# Patient Record
Sex: FEMALE | Race: WHITE | NOT HISPANIC OR LATINO | Employment: FULL TIME | URBAN - METROPOLITAN AREA
[De-identification: names, ages, dates, MRNs, and addresses within clinical notes are randomized per-mention and may not be internally consistent; named-entity substitution may affect disease eponyms.]

---

## 2018-01-04 ENCOUNTER — CONVERSION ENCOUNTER (OUTPATIENT)
Dept: OTHER | Facility: HOSPITAL | Age: 38
End: 2018-01-04

## 2018-01-25 ENCOUNTER — OFFICE VISIT CONVERTED (OUTPATIENT)
Dept: ORTHOPEDIC SURGERY | Facility: CLINIC | Age: 38
End: 2018-01-25
Attending: ORTHOPAEDIC SURGERY

## 2018-02-02 ENCOUNTER — CONVERSION ENCOUNTER (OUTPATIENT)
Dept: ORTHOPEDIC SURGERY | Facility: CLINIC | Age: 38
End: 2018-02-02

## 2018-02-02 ENCOUNTER — OFFICE VISIT CONVERTED (OUTPATIENT)
Dept: ORTHOPEDIC SURGERY | Facility: CLINIC | Age: 38
End: 2018-02-02
Attending: ORTHOPAEDIC SURGERY

## 2018-02-15 ENCOUNTER — OFFICE VISIT CONVERTED (OUTPATIENT)
Dept: ORTHOPEDIC SURGERY | Facility: CLINIC | Age: 38
End: 2018-02-15
Attending: ORTHOPAEDIC SURGERY

## 2018-03-01 ENCOUNTER — OFFICE VISIT CONVERTED (OUTPATIENT)
Dept: ORTHOPEDIC SURGERY | Facility: CLINIC | Age: 38
End: 2018-03-01
Attending: ORTHOPAEDIC SURGERY

## 2018-04-05 ENCOUNTER — OFFICE VISIT CONVERTED (OUTPATIENT)
Dept: ORTHOPEDIC SURGERY | Facility: CLINIC | Age: 38
End: 2018-04-05
Attending: ORTHOPAEDIC SURGERY

## 2018-07-11 ENCOUNTER — OFFICE VISIT CONVERTED (OUTPATIENT)
Dept: OTOLARYNGOLOGY | Facility: CLINIC | Age: 38
End: 2018-07-11
Attending: OTOLARYNGOLOGY

## 2018-07-20 ENCOUNTER — OFFICE VISIT CONVERTED (OUTPATIENT)
Dept: OTOLARYNGOLOGY | Facility: CLINIC | Age: 38
End: 2018-07-20
Attending: OTOLARYNGOLOGY

## 2018-12-19 ENCOUNTER — CONVERSION ENCOUNTER (OUTPATIENT)
Dept: GENERAL RADIOLOGY | Facility: HOSPITAL | Age: 38
End: 2018-12-19

## 2019-02-15 ENCOUNTER — CONVERSION ENCOUNTER (OUTPATIENT)
Dept: PLASTIC SURGERY | Facility: CLINIC | Age: 39
End: 2019-02-15

## 2019-02-15 ENCOUNTER — OFFICE VISIT CONVERTED (OUTPATIENT)
Dept: PLASTIC SURGERY | Facility: CLINIC | Age: 39
End: 2019-02-15
Attending: PLASTIC SURGERY

## 2019-10-08 ENCOUNTER — HOSPITAL ENCOUNTER (OUTPATIENT)
Dept: OTHER | Facility: HOSPITAL | Age: 39
Discharge: HOME | End: 2019-10-08
Attending: NURSE PRACTITIONER

## 2019-10-08 LAB
BASOPHILS # BLD AUTO: 0.06 10*3/UL (ref 0–0.2)
BASOPHILS NFR BLD AUTO: 0.5 % (ref 0–3)
CONV ABS IMM GRAN: 0.07 10*3/UL (ref 0–0.2)
CONV IMMATURE GRAN: 0.5 % (ref 0–1.8)
DEPRECATED RDW RBC AUTO: 47.9 FL (ref 36.4–46.3)
EOSINOPHIL # BLD AUTO: 0.34 10*3/UL (ref 0–0.7)
EOSINOPHIL # BLD AUTO: 2.7 % (ref 0–7)
ERYTHROCYTE [DISTWIDTH] IN BLOOD BY AUTOMATED COUNT: 12.8 % (ref 11.7–14.4)
HCT VFR BLD AUTO: 47 % (ref 37–47)
HGB BLD-MCNC: 15.3 G/DL (ref 12–16)
LYMPHOCYTES # BLD AUTO: 1.69 10*3/UL (ref 1–5)
LYMPHOCYTES NFR BLD AUTO: 13.3 % (ref 20–45)
MCH RBC QN AUTO: 32.8 PG (ref 27–31)
MCHC RBC AUTO-ENTMCNC: 32.6 G/DL (ref 33–37)
MCV RBC AUTO: 100.9 FL (ref 81–99)
MONOCYTES # BLD AUTO: 0.74 10*3/UL (ref 0.2–1.2)
MONOCYTES NFR BLD AUTO: 5.8 % (ref 3–10)
NEUTROPHILS # BLD AUTO: 9.83 10*3/UL (ref 2–8)
NEUTROPHILS NFR BLD AUTO: 77.2 % (ref 30–85)
NRBC CBCN: 0 % (ref 0–0.7)
PLATELET # BLD AUTO: 305 10*3/UL (ref 130–400)
PMV BLD AUTO: 10 FL (ref 9.4–12.3)
RBC # BLD AUTO: 4.66 10*6/UL (ref 4.2–5.4)
WBC # BLD AUTO: 12.73 10*3/UL (ref 4.8–10.8)

## 2020-01-23 ENCOUNTER — OFFICE (OUTPATIENT)
Dept: URBAN - METROPOLITAN AREA CLINIC 75 | Facility: CLINIC | Age: 40
End: 2020-01-23

## 2020-01-23 VITALS
WEIGHT: 215 LBS | HEART RATE: 65 BPM | SYSTOLIC BLOOD PRESSURE: 132 MMHG | HEIGHT: 67 IN | DIASTOLIC BLOOD PRESSURE: 82 MMHG

## 2020-01-23 DIAGNOSIS — R10.12 LEFT UPPER QUADRANT PAIN: ICD-10-CM

## 2020-01-23 DIAGNOSIS — R10.11 RIGHT UPPER QUADRANT PAIN: ICD-10-CM

## 2020-01-23 DIAGNOSIS — Z72.0 TOBACCO USE: ICD-10-CM

## 2020-01-23 DIAGNOSIS — K59.00 CONSTIPATION, UNSPECIFIED: ICD-10-CM

## 2020-01-23 PROCEDURE — 99203 OFFICE O/P NEW LOW 30 MIN: CPT | Performed by: INTERNAL MEDICINE

## 2020-01-30 ENCOUNTER — HOSPITAL ENCOUNTER (OUTPATIENT)
Dept: GENERAL RADIOLOGY | Facility: HOSPITAL | Age: 40
Discharge: HOME | End: 2020-01-30
Attending: INTERNAL MEDICINE

## 2020-02-04 ENCOUNTER — HOSPITAL ENCOUNTER (OUTPATIENT)
Dept: GENERAL RADIOLOGY | Facility: HOSPITAL | Age: 40
Discharge: HOME | End: 2020-02-04
Attending: OBSTETRICS & GYNECOLOGY

## 2020-02-11 VITALS
DIASTOLIC BLOOD PRESSURE: 67 MMHG | HEART RATE: 49 BPM | HEART RATE: 46 BPM | SYSTOLIC BLOOD PRESSURE: 148 MMHG | RESPIRATION RATE: 18 BRPM | OXYGEN SATURATION: 100 % | WEIGHT: 215 LBS | HEART RATE: 48 BPM | SYSTOLIC BLOOD PRESSURE: 147 MMHG | DIASTOLIC BLOOD PRESSURE: 80 MMHG | DIASTOLIC BLOOD PRESSURE: 73 MMHG | SYSTOLIC BLOOD PRESSURE: 131 MMHG | RESPIRATION RATE: 16 BRPM | HEART RATE: 47 BPM | TEMPERATURE: 97.7 F | RESPIRATION RATE: 9 BRPM | HEART RATE: 54 BPM | DIASTOLIC BLOOD PRESSURE: 78 MMHG | HEART RATE: 50 BPM | DIASTOLIC BLOOD PRESSURE: 79 MMHG | DIASTOLIC BLOOD PRESSURE: 70 MMHG | RESPIRATION RATE: 14 BRPM | DIASTOLIC BLOOD PRESSURE: 72 MMHG | HEART RATE: 45 BPM | DIASTOLIC BLOOD PRESSURE: 75 MMHG | HEIGHT: 67 IN | OXYGEN SATURATION: 99 % | SYSTOLIC BLOOD PRESSURE: 163 MMHG | DIASTOLIC BLOOD PRESSURE: 61 MMHG | SYSTOLIC BLOOD PRESSURE: 128 MMHG | OXYGEN SATURATION: 97 % | SYSTOLIC BLOOD PRESSURE: 155 MMHG | SYSTOLIC BLOOD PRESSURE: 127 MMHG | DIASTOLIC BLOOD PRESSURE: 84 MMHG | SYSTOLIC BLOOD PRESSURE: 152 MMHG | OXYGEN SATURATION: 94 % | RESPIRATION RATE: 24 BRPM | RESPIRATION RATE: 19 BRPM | RESPIRATION RATE: 20 BRPM | DIASTOLIC BLOOD PRESSURE: 48 MMHG | SYSTOLIC BLOOD PRESSURE: 123 MMHG | SYSTOLIC BLOOD PRESSURE: 108 MMHG | TEMPERATURE: 97.1 F | RESPIRATION RATE: 46 BRPM

## 2020-02-12 ENCOUNTER — AMBULATORY SURGICAL CENTER (OUTPATIENT)
Dept: URBAN - METROPOLITAN AREA SURGERY 17 | Facility: SURGERY | Age: 40
End: 2020-02-12

## 2020-02-12 ENCOUNTER — OFFICE (OUTPATIENT)
Dept: URBAN - METROPOLITAN AREA PATHOLOGY 4 | Facility: PATHOLOGY | Age: 40
End: 2020-02-12

## 2020-02-12 DIAGNOSIS — R10.11 RIGHT UPPER QUADRANT PAIN: ICD-10-CM

## 2020-02-12 DIAGNOSIS — R10.12 LEFT UPPER QUADRANT PAIN: ICD-10-CM

## 2020-02-12 DIAGNOSIS — K31.89 OTHER DISEASES OF STOMACH AND DUODENUM: ICD-10-CM

## 2020-02-12 DIAGNOSIS — K29.80 DUODENITIS WITHOUT BLEEDING: ICD-10-CM

## 2020-02-12 DIAGNOSIS — K29.70 GASTRITIS, UNSPECIFIED, WITHOUT BLEEDING: ICD-10-CM

## 2020-02-12 DIAGNOSIS — K21.0 GASTRO-ESOPHAGEAL REFLUX DISEASE WITH ESOPHAGITIS: ICD-10-CM

## 2020-02-12 LAB
GI HISTOLOGY: A. SELECT: (no result)
GI HISTOLOGY: B. UNSPECIFIED: (no result)
GI HISTOLOGY: C. UNSPECIFIED: (no result)
GI HISTOLOGY: PDF REPORT: (no result)

## 2020-02-12 PROCEDURE — 43239 EGD BIOPSY SINGLE/MULTIPLE: CPT | Performed by: INTERNAL MEDICINE

## 2020-02-12 PROCEDURE — 88305 TISSUE EXAM BY PATHOLOGIST: CPT | Performed by: INTERNAL MEDICINE

## 2020-02-14 PROBLEM — K29.70 GASTRITIS, UNSPECIFIED, WITHOUT BLEEDING: Status: ACTIVE | Noted: 2020-02-12

## 2020-02-14 PROBLEM — K31.89 OTHER DISEASES OF STOMACH AND DUODENUM: Status: ACTIVE | Noted: 2020-02-12

## 2020-02-14 PROBLEM — K29.80 DUODENITIS WITHOUT BLEEDING: Status: ACTIVE | Noted: 2020-02-12

## 2020-02-14 PROBLEM — K22.8 OTHER SPECIFIED DISEASES OF ESOPHAGUS: Status: ACTIVE | Noted: 2020-02-12

## 2020-03-05 ENCOUNTER — HOSPITAL ENCOUNTER (OUTPATIENT)
Dept: SLEEP MEDICINE | Facility: HOSPITAL | Age: 40
Discharge: HOME | End: 2020-03-05
Attending: INTERNAL MEDICINE

## 2020-03-10 ENCOUNTER — HOSPITAL ENCOUNTER (OUTPATIENT)
Dept: SLEEP MEDICINE | Facility: HOSPITAL | Age: 40
Discharge: HOME | End: 2020-03-10
Attending: INTERNAL MEDICINE

## 2020-10-09 ENCOUNTER — HOSPITAL ENCOUNTER (OUTPATIENT)
Dept: ULTRASOUND IMAGING | Facility: HOSPITAL | Age: 40
Discharge: HOME | End: 2020-10-09
Attending: OBSTETRICS & GYNECOLOGY

## 2020-11-06 ENCOUNTER — HOSPITAL ENCOUNTER (OUTPATIENT)
Dept: OTHER | Facility: HOSPITAL | Age: 40
Discharge: HOME | End: 2020-11-06
Attending: OBSTETRICS & GYNECOLOGY

## 2021-03-05 ENCOUNTER — HOSPITAL ENCOUNTER (OUTPATIENT)
Dept: GENERAL RADIOLOGY | Facility: HOSPITAL | Age: 41
Discharge: HOME | End: 2021-03-05
Attending: OBSTETRICS & GYNECOLOGY

## 2021-04-08 ENCOUNTER — HOSPITAL ENCOUNTER (OUTPATIENT)
Dept: SLEEP MEDICINE | Facility: HOSPITAL | Age: 41
Discharge: HOME | End: 2021-04-08
Attending: INTERNAL MEDICINE

## 2021-05-16 VITALS
RESPIRATION RATE: 16 BRPM | HEIGHT: 67 IN | DIASTOLIC BLOOD PRESSURE: 69 MMHG | BODY MASS INDEX: 37.84 KG/M2 | OXYGEN SATURATION: 97 % | TEMPERATURE: 98.4 F | HEART RATE: 66 BPM | WEIGHT: 241.12 LBS | SYSTOLIC BLOOD PRESSURE: 120 MMHG

## 2021-05-16 VITALS
OXYGEN SATURATION: 96 % | BODY MASS INDEX: 33.12 KG/M2 | WEIGHT: 211 LBS | DIASTOLIC BLOOD PRESSURE: 83 MMHG | SYSTOLIC BLOOD PRESSURE: 130 MMHG | HEIGHT: 67 IN | HEART RATE: 73 BPM

## 2021-05-16 VITALS — HEART RATE: 77 BPM | OXYGEN SATURATION: 98 % | WEIGHT: 225 LBS | HEIGHT: 68 IN | BODY MASS INDEX: 34.1 KG/M2

## 2021-05-16 VITALS — OXYGEN SATURATION: 96 % | HEART RATE: 80 BPM | BODY MASS INDEX: 42.46 KG/M2 | HEIGHT: 67 IN | WEIGHT: 270.5 LBS

## 2021-05-16 VITALS — OXYGEN SATURATION: 97 % | BODY MASS INDEX: 34.1 KG/M2 | HEART RATE: 78 BPM | WEIGHT: 225 LBS | HEIGHT: 68 IN

## 2021-05-16 VITALS — WEIGHT: 225 LBS | BODY MASS INDEX: 34.1 KG/M2 | HEART RATE: 96 BPM | OXYGEN SATURATION: 98 % | HEIGHT: 68 IN

## 2021-05-16 VITALS — HEIGHT: 67 IN | HEART RATE: 74 BPM | OXYGEN SATURATION: 98 % | BODY MASS INDEX: 41.5 KG/M2

## 2021-05-16 VITALS
HEART RATE: 73 BPM | DIASTOLIC BLOOD PRESSURE: 62 MMHG | SYSTOLIC BLOOD PRESSURE: 126 MMHG | HEIGHT: 67 IN | RESPIRATION RATE: 16 BRPM | BODY MASS INDEX: 38.34 KG/M2 | TEMPERATURE: 98.3 F | OXYGEN SATURATION: 97 % | WEIGHT: 244.25 LBS

## 2021-05-16 VITALS — WEIGHT: 265 LBS | BODY MASS INDEX: 41.59 KG/M2 | HEART RATE: 110 BPM | OXYGEN SATURATION: 94 % | HEIGHT: 67 IN

## 2021-07-02 ENCOUNTER — OFFICE VISIT (OUTPATIENT)
Dept: ORTHOPEDIC SURGERY | Facility: CLINIC | Age: 41
End: 2021-07-02

## 2021-07-02 VITALS — OXYGEN SATURATION: 99 % | BODY MASS INDEX: 32.83 KG/M2 | HEIGHT: 67 IN | WEIGHT: 209.2 LBS | HEART RATE: 81 BPM

## 2021-07-02 DIAGNOSIS — S93.401A SPRAIN OF RIGHT ANKLE, UNSPECIFIED LIGAMENT, INITIAL ENCOUNTER: ICD-10-CM

## 2021-07-02 DIAGNOSIS — S90.511A ABRASION OF RIGHT ANKLE, INITIAL ENCOUNTER: ICD-10-CM

## 2021-07-02 DIAGNOSIS — M25.571 RIGHT ANKLE PAIN, UNSPECIFIED CHRONICITY: Primary | ICD-10-CM

## 2021-07-02 PROCEDURE — 99203 OFFICE O/P NEW LOW 30 MIN: CPT | Performed by: ORTHOPAEDIC SURGERY

## 2021-07-02 RX ORDER — ERGOCALCIFEROL 1.25 MG/1
50000 CAPSULE ORAL
COMMUNITY
Start: 2021-06-08

## 2021-07-02 RX ORDER — LEVOTHYROXINE AND LIOTHYRONINE 9.5; 2.25 UG/1; UG/1
TABLET ORAL
COMMUNITY

## 2021-07-02 RX ORDER — UBIDECARENONE 100 MG
100 CAPSULE ORAL 3 TIMES DAILY
COMMUNITY

## 2021-07-02 RX ORDER — LEVOTHYROXINE AND LIOTHYRONINE 19; 4.5 UG/1; UG/1
TABLET ORAL
COMMUNITY
End: 2021-12-07 | Stop reason: DRUGHIGH
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
## 2021-09-01 ENCOUNTER — OFFICE VISIT (OUTPATIENT)
Dept: SLEEP MEDICINE | Facility: HOSPITAL | Age: 41
End: 2021-09-01

## 2021-09-01 VITALS
TEMPERATURE: 97.4 F | OXYGEN SATURATION: 97 % | SYSTOLIC BLOOD PRESSURE: 125 MMHG | DIASTOLIC BLOOD PRESSURE: 62 MMHG | HEIGHT: 67 IN | HEART RATE: 71 BPM | BODY MASS INDEX: 32.96 KG/M2 | WEIGHT: 210 LBS

## 2021-09-01 DIAGNOSIS — G47.33 OSA (OBSTRUCTIVE SLEEP APNEA): Primary | ICD-10-CM

## 2021-09-01 PROCEDURE — G0463 HOSPITAL OUTPT CLINIC VISIT: HCPCS

## 2021-09-01 PROCEDURE — 99213 OFFICE O/P EST LOW 20 MIN: CPT | Performed by: INTERNAL MEDICINE

## 2021-09-01 NOTE — PROGRESS NOTES
"  [address] [address]  Phone: [phone]  Fax: [phone]      SLEEP CLINIC FOLLOW UP PROGRESS NOTE.    Vanessa Giang  1980  40 y.o.  female      PCP: Marizol Cee APRN      Date of visit: 9/1/2021    Chief Complaint   Patient presents with   • Sleep Apnea   • Obesity       HPI:  This is a 40 y.o. years old patient who has a history of obstructive sleep apnea for follow-up.  Quite recently she saw Dr. Tee in April.  She is here to discuss her options for her sleep apnea.  I have reviewed her sleep studies which were done in 2014 which showed AHI of 5.3 and at which time her weight was 257 pounds and another home sleep test in March 2020 which showed AHI of 5.2.    Patient reports that you in spite of using the CPAP she does not see much difference and Dr. Tee had told her that she is a borderline and may not need the CPAP.    I have reviewed her download which shows that she does not need much pressure and her 90th percentile on the download is 7 cm.    Medications and allergies are reviewed by me and documented in the encounter.     SOCIAL ( habits pertaining to sleep medicine)  History tobacco use:No   History of alcohol use: 0 per week  Caffeine use: 0     REVIEW OF SYSTEMS:   Adams Sleepiness Scale :Total score: 9   Nasal congestion:No   Dry mouth/nose:Yes   Post nasal drip; No   Acid reflux/Heartburn:No   Abd bloating:No   Morning headache:Yes   Anxiety:Yes   Depression:No    PHYSICAL EXAMINATION:  CONSTITUTIONAL:  Vitals:    09/01/21 1500   BP: 125/62   Pulse: 71   Temp: 97.4 °F (36.3 °C)   SpO2: 97%   Weight: 95.3 kg (210 lb)   Height: 170.2 cm (67\")    Body mass index is 32.89 kg/m².   NOSE: nasal passages are clear, no nasal polyps, septum in the midline.  THROAT: throat is clear, oral airway Mallampati class 2  RESP SYSTEM: Breath sounds are normal, no wheezes or crackles  CARDIOVASULAR: Heart rate is regular without " murmur. No edema      ASSESSMENT AND PLAN:  · Obstructive sleep apnea ( G 47.33).  Mild at AHI 5.2 as the patient has not seen witnessed significant improvement with CPAP and her sleep apnea is very mild I have talked to her about losing weight and sleeping on the side.  She does not require CPAP and have advised her to discontinue CPAP.  · Obesity, class 1 with BMI is Body mass index is 32.89 kg/m².. I have discuss the relationship between the weight and sleep apnea. The benefit of weight loss in reducing severity of sleep apnea was discussed. Discussed diet and exercise with the patient to achieve ideal BMI.  · Return if symptoms worsen or fail to improve. . Patient's questions were answered.        Merlin Becerra MD  Sleep Medicine.  Medical Director, [facility]  9/1/2021 ,

## 2021-12-07 ENCOUNTER — OFFICE VISIT (OUTPATIENT)
Dept: CARDIOLOGY | Facility: CLINIC | Age: 41
End: 2021-12-07

## 2021-12-07 VITALS
HEART RATE: 66 BPM | DIASTOLIC BLOOD PRESSURE: 80 MMHG | HEIGHT: 67 IN | BODY MASS INDEX: 35.94 KG/M2 | WEIGHT: 229 LBS | SYSTOLIC BLOOD PRESSURE: 132 MMHG

## 2021-12-07 DIAGNOSIS — R07.9 CHEST PAIN, UNSPECIFIED TYPE: Primary | ICD-10-CM

## 2021-12-07 PROCEDURE — 93000 ELECTROCARDIOGRAM COMPLETE: CPT | Performed by: SPECIALIST

## 2021-12-07 PROCEDURE — 99203 OFFICE O/P NEW LOW 30 MIN: CPT | Performed by: SPECIALIST

## 2021-12-07 NOTE — PROGRESS NOTES
[facility]   Cardiology Consult Note    Patient Name: Vanessa Gaing  : 1980  Referring Physician: No ref. provider found  Subjective   Subjective     Reason for Consult/ Chief Complaint:   Chief Complaint   Patient presents with   • Establish Care   • Chest Pain   • Cushing's Disease       HPI:  Vanessa Giang is a 41 y.o. female with history of chest pain on and off for the last several years.  Chest pain is substernal, mild in intensity, sharp to aching, lasts for a few minutes to several minutes.  No aggravating or relieving factors.  No exertional angina.  No shortness of breath.  No palpitations.  No syncopal or presyncopal episode.    Review of Systems:   Constitutional no fever,  no weight loss   Skin no rash   Otolaryngeal no difficulty swallowing   Cardiovascular See HPI   Pulmonary no cough, no sputum production   Gastrointestinal no constipation, no diarrhea   Genitourinary no dysuria, no hematuria   Hematologic no easy bruisability, no abnormal bleeding   Musculoskeletal no muscle pain   Neurologic no dizziness, no falls     Personal History     Past Medical History:  Past Medical History:   Diagnosis Date   • Hyperlipidemia        Family History: History reviewed. No pertinent family history.    Social History:  reports that she quit smoking about a year ago. She has never used smokeless tobacco. She reports that she does not drink alcohol and does not use drugs.    Home Medications:  ascorbic acid, cholecalciferol, coenzyme Q10, thyroid, and vitamin D    Allergies:  Allergies   Allergen Reactions   • Avocado Rash   • Benzalkonium Chloride Rash   • Formaldehyde Rash   • Gold Rash   • Latex Rash       Objective    Objective     Vitals:   Heart Rate:  [66] 66  BP: (132)/(80) 132/80  Body mass index is 35.87 kg/m².  Physical Exam:   Constitutional: Awake, alert, No acute distress    Eyes: PERRLA, sclerae anicteric, no conjunctival injection   HENT: NCAT, mucous membranes  moist   Neck: Supple, no thyromegaly, no lymphadenopathy, trachea midline   Respiratory: Clear to auscultation bilaterally, nonlabored respirations    Cardiovascular: RRR, no murmurs or rubs. Palpable pedal pulses bilaterally   Gastrointestinal: Positive bowel sounds, soft, nontender, nondistended   Musculoskeletal: No bilateral ankle edema, no clubbing or cyanosis to extremities   Psychiatric: Appropriate affect, cooperative   Neurologic: Oriented x 3, strength symmetric in all extremities, Cranial Nerves grossly intact to confrontation, speech clear   Skin: No rashes     Result Review    Result Review:  I have personally reviewed the available results:  [x]  Laboratory  [x]  EKG/Telemetry   [x]  Cardiology/Vascular   [x] Medications  [x]  Old records      ECG 12 Lead    Date/Time: 12/7/2021 10:02 AM  Performed by: Bc Burnett MD  Authorized by: Bc Burnett MD   Rhythm: sinus rhythm  Other findings: poor R wave progression    Clinical impression: abnormal EKG             Impression/Plan  1.  Precordial atypical chest pain: Treadmill stress test to evaluate for any significant ischemia.  Echocardiogram to evaluate left ventricular systolic function.  2.  Cushing's disease: Continue thyroid replacement.  Being evaluated in the [facility].        Electronically signed by Bc Burnett MD, 12/07/21, 9:40 AM EST.

## 2022-06-29 ENCOUNTER — TRANSCRIBE ORDERS (OUTPATIENT)
Dept: PHYSICAL THERAPY | Facility: CLINIC | Age: 42
End: 2022-06-29

## 2022-06-29 ENCOUNTER — TREATMENT (OUTPATIENT)
Dept: PHYSICAL THERAPY | Facility: CLINIC | Age: 42
End: 2022-06-29

## 2022-06-29 DIAGNOSIS — E24.9 CUSHING'S SYNDROME: Primary | ICD-10-CM

## 2022-06-29 DIAGNOSIS — R53.1 WEAKNESS: ICD-10-CM

## 2022-06-29 PROCEDURE — 97110 THERAPEUTIC EXERCISES: CPT | Performed by: PHYSICAL THERAPIST

## 2022-06-29 PROCEDURE — 97161 PT EVAL LOW COMPLEX 20 MIN: CPT | Performed by: PHYSICAL THERAPIST

## 2022-06-29 NOTE — PROGRESS NOTES
"  Physical Therapy Initial Evaluation and Plan of Care    Patient: Vanessa Giang   : 1980  Diagnosis/ICD-10 Code:  Cushing's syndrome (HCC) [E24.9]  Referring practitioner: JEFFERSON Burton  Date of Initial Visit: 2022  Today's Date: 2022  Patient seen for 1 sessions           Subjective Questionnaire: LEFS:  = 20% Function      Subjective Evaluation    History of Present Illness  Mechanism of injury: The patient presents to physical therapy with complaints of weakness secondary to a diagnosis of Cushing's Disease. She was diagnosed 15 years ago. She recently had the tumor removed that was causing her symptoms. She is still taking hydrocortisone regularly, but is working towards weaning off of it. Today is a bad day in regards to pain. She hurts \"everywhere\". Her hip pain is increased with prolonged walking, standing, or sitting. Her other pains seem to fluctuate randomly throughout the day. She isn't currently not taking anything for pain. When her pain is really bad she lays in bed and waits until it subsides. She has a history of minimally invasive low back surgery ~. She still has some back pain from this surgery.      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           Objective          Tenderness     Additional Tenderness Details  Tenderness to palpation of bilateral upper trapezius, thoracic paraspinals, lumbar paraspinals, sacral border, and piriformis.    Neurological Testing     Additional Neurological Details  Sensation to light touch intact and equal bilaterally from C2-T1 dermatomal distribution.    Sensation to light touch intact and equal bilaterally from L2-S1 dermatomal " "distribution.    Active Range of Motion     Additional Active Range of Motion Details  Bilateral shoulder AROM grossly WFL, but patient noted \"tightness\" with all directions of movement.  Lumbar AROM grossly WFL, but patient noted \"tightness\" with all directions of movement.    Strength/Myotome Testing     Left Shoulder     Planes of Motion   Flexion: 3+   Abduction: 3+   External rotation at 0°: 4   Internal rotation at 0°: 4+     Right Shoulder     Planes of Motion   Flexion: 3+   Abduction: 3+   External rotation at 0°: 4   Internal rotation at 0°: 4+     Left Hip   Planes of Motion   Flexion: 3+  Extension: 3+  Abduction: 4-    Right Hip   Planes of Motion   Flexion: 4-  Extension: 3+  Abduction: 4-    Left Knee   Flexion: 4  Extension: 4+    Right Knee   Flexion: 4  Extension: 4+    Left Ankle/Foot   Dorsiflexion: 4+    Right Ankle/Foot   Dorsiflexion: 4+    Ambulation     Ambulation: Level Surfaces     Additional Level Surfaces Ambulation Details  The patient ambulates with a bilateral Trendelenburg gait pattern.    Functional Assessment     Comments  2 minute walk test: 330 feet without an AD      See Exercise, Manual, and Modality Logs for complete treatment.     Assessment & Plan     Assessment  Impairments: abnormal gait, abnormal muscle firing, abnormal or restricted ROM, activity intolerance, impaired balance, impaired physical strength, lacks appropriate home exercise program, pain with function, safety issue and weight-bearing intolerance  Functional Limitations: carrying objects, lifting, walking, uncomfortable because of pain, moving in bed, standing and stooping  Assessment details: The patient presents to physical therapy with previous medical diagnosis of Cushing's syndrome. She presents with global joint pains and associated lower extremity weakness, shoulder weakness, joint stiffness, and functional mobility deficits (LEFS). She would benefit from skilled physical therapy as described below to " address these limitations and allow the patient to return to her prior level of function.  Prognosis: good    Goals  Plan Goals: 1. The patient complains of global joint pain.   LTG 1: 12 weeks:  The patient will report a pain rating of 3/10 or better in order to improve  tolerance to activities of daily living and improve sleep quality.    STATUS:  New   STG 1a: 6 weeks:  The patient will report a pain rating of 5/10 or better.    STATUS:  New   TREATMENT:  Therapeutic exercises, manual therapy, aquatic therapy, home exercise   instruction, and modalities as needed for pain to include:  electrical stimulation, moist heat, ice,   ultrasound, and diathermy.    2. The patient demonstrates weakness of the bilateral hips.   LTG 2: 12 weeks:  The patient will demonstrate 4+/5 strength for bilateral hip flexion, abduction,  and extension in order to improve hip stability.    STATUS:  New   STG 2a: 6 weeks:  The patient will demonstrate 4/5 strength for bilateral hip flexion, abduction,  and extension.    STATUS:  New   TREATMENT: Therapeutic exercises, manual therapy, aquatic therapy, home exercise instruction,  and modalities as needed for pain to include:  electrical stimulation, moist heat, ice, and ultrasound    3. The patient demonstrates weakness of the bilateral shoulders.   LTG 3: 12 weeks:  The patient will demonstrate 4+/5 strength for bilateral shoulder flexion, abduction,  and external rotation in order to improve shoulder stability.    STATUS:  New   STG 3a: 6 weeks: The patient will demonstrate 4/5 strength for bilateral shoulder flexion, abduction,  and external rotation.    STATUS:  New   TREATMENT: Therapeutic exercises, manual therapy, aquatic therapy, home exercise instruction,  and modalities as needed for pain to include:  electrical stimulation, moist heat, ice, and ultrasound    4. Mobility: Walking/Moving Around Functional Limitation     LTG 4: 12 weeks:  The patient will demonstrate 50% limitation  by achieving a score of 40/80 on the Lower Extremity Functional Scale.    STATUS:  New   STG 4a: 6 weeks:  The patient will demonstrate 62.5% limitation by achieving a score of 30/80 on the Lower Extremity Functional Scale.      STATUS:  New   TREATMENT:  Manual therapy, therapeutic exercise, home exercise instruction, and modalities as needed to include: moist heat, electrical stimulation, and ultrasound.    5. Mobility: Walking/Moving Around Functional Limitation     LTG 5: 12 weeks:  The patient will demonstrate improved tolerance to community ambulation by traveling 400 feet or greater in 2 minutes.    STATUS:  New   TREATMENT:  Manual therapy, therapeutic exercise, home exercise instruction, and modalities as needed to include: moist heat, electrical stimulation, and ultrasound.    Plan  Therapy options: will be seen for skilled therapy services  Planned modality interventions: cryotherapy, electrical stimulation/Russian stimulation and TENS  Planned therapy interventions: balance/weight-bearing training, ADL retraining, soft tissue mobilization, strengthening, stretching, therapeutic activities, joint mobilization, home exercise program, gait training, functional ROM exercises, flexibility, body mechanics training, postural training, neuromuscular re-education and manual therapy  Frequency: 2x week  Duration in weeks: 12  Treatment plan discussed with: patient        Visit Diagnoses:    ICD-10-CM ICD-9-CM   1. Cushing's syndrome (HCC)  E24.9 255.0   2. Weakness  R53.1 780.79       History # of Personal Factors and/or Comorbidities: MODERATE (1-2)  Examination of Body System(s): # of elements: LOW (1-2)  Clinical Presentation: STABLE   Clinical Decision Making: LOW       Timed:         Manual Therapy:    0     mins  74261;     Therapeutic Exercise:    10     mins  87240;     Neuromuscular Álvaro:    0    mins  91988;    Therapeutic Activity:     0     mins  18062;     Gait Trainin     mins  38134;      Ultrasound:     0     mins  33593;    Ionto                               0    mins   21069  Self Care                       0     mins   50877  Canalith Repos    0     mins 24874      Un-Timed:  Electrical Stimulation:    0     mins  14316 ( );  Dry Needling     0     mins self-pay  Traction     0     mins 33347  Low Eval     30     Mins  78300  Mod Eval     0     Mins  31861  High Eval                       0     Mins  83499  Re-Eval                           0    mins  55901    Timed Treatment:   10   mins   Total Treatment:     40   mins    PT SIGNATURE: Karson Burton PT    Electronically signed 6/29/2022    [place] License: PT - [id number]      Initial Certification  Certification Period: 6/29/2022 thru 9/26/2022  I certify that the therapy services are furnished while this patient is under my care.  The services outlined above are required by this patient, and will be reviewed every 90 days.     PHYSICIAN: Marizol Cee APRN  NPI: [id number]      DATE:     Please sign and return via fax to [phone]. Thank you, [facility].

## 2022-07-01 ENCOUNTER — TRANSCRIBE ORDERS (OUTPATIENT)
Dept: ADMINISTRATIVE | Facility: HOSPITAL | Age: 42
End: 2022-07-01

## 2022-07-01 DIAGNOSIS — Z12.31 VISIT FOR SCREENING MAMMOGRAM: Primary | ICD-10-CM

## 2022-07-05 ENCOUNTER — TREATMENT (OUTPATIENT)
Dept: PHYSICAL THERAPY | Facility: CLINIC | Age: 42
End: 2022-07-05

## 2022-07-05 DIAGNOSIS — R53.1 WEAKNESS: ICD-10-CM

## 2022-07-05 DIAGNOSIS — E24.9 CUSHING'S SYNDROME: Primary | ICD-10-CM

## 2022-07-05 PROCEDURE — 97530 THERAPEUTIC ACTIVITIES: CPT | Performed by: PHYSICAL THERAPIST

## 2022-07-05 PROCEDURE — 97110 THERAPEUTIC EXERCISES: CPT | Performed by: PHYSICAL THERAPIST

## 2022-07-05 NOTE — PROGRESS NOTES
Physical Therapy Daily Treatment Note    Patient: Vanessa Giang   : 1980  Diagnosis/ICD-10 Code:  Cushing's syndrome (HCC) [E24.9]  Referring practitioner: JEFFERSON Burton  Date of Initial Visit: Type: THERAPY  Noted: 2022  Today's Date: 2022  Patient seen for 2 sessions           Subjective   The patient reported no new complaints today. She hasn't been as compliant as she would like with her home exercise program.    Objective   See Exercise, Manual, and Modality Logs for complete treatment.     Assessment/Plan  The patient demonstrated good tolerance to all functional strengthening exercise today. Continue to progress per patient tolerance.       Timed:  Manual Therapy:    0     mins  07314;  Therapeutic Exercise:    24     mins  87873;     Neuromuscular Álvaro:   0    mins  58535;    Therapeutic Activity:     10     mins  72408;     Gait Trainin     mins  66146;     Aquatics                         0      mins  98531    Un-timed:  Mechanical Traction      0     mins  64039  Dry Needling     0     mins self-pay  Electrical Stimulation:    0     mins  85410 ( );      Timed Treatment:   34   mins   Total Treatment:     34   mins    Kasron Burton PT  Physical Therapist    Electronically signed 2022    [place] License: PT - [id number]

## 2022-07-07 ENCOUNTER — TREATMENT (OUTPATIENT)
Dept: PHYSICAL THERAPY | Facility: CLINIC | Age: 42
End: 2022-07-07

## 2022-07-07 DIAGNOSIS — R53.1 WEAKNESS: ICD-10-CM

## 2022-07-07 DIAGNOSIS — E24.9 CUSHING'S SYNDROME: Primary | ICD-10-CM

## 2022-07-07 PROCEDURE — 97110 THERAPEUTIC EXERCISES: CPT | Performed by: PHYSICAL THERAPIST

## 2022-07-07 PROCEDURE — 97530 THERAPEUTIC ACTIVITIES: CPT | Performed by: PHYSICAL THERAPIST

## 2022-07-07 NOTE — PROGRESS NOTES
Physical Therapy Daily Treatment Note    Patient: Vanessa Giang   : 1980  Diagnosis/ICD-10 Code:  Cushing's syndrome (HCC) [E24.9]  Referring practitioner: JEFFERSON Burton  Date of Initial Visit: Type: THERAPY  Noted: 2022  Today's Date: 2022  Patient seen for 3 sessions           Subjective   The patient reported that she wasn't too sore the day after her last session, but started to get sore on the second day afterwards.    Objective   See Exercise, Manual, and Modality Logs for complete treatment.     Assessment/Plan  The patient demonstrated good tolerance to all functional strengthening exercise today. Continue to progress per patient tolerance.       Timed:  Manual Therapy:    0     mins  39854;  Therapeutic Exercise:    24     mins  94823;     Neuromuscular Álvaro:   0    mins  46209;    Therapeutic Activity:     8     mins  40683;     Gait Trainin     mins  59123;     Aquatics                         0      mins  41834    Un-timed:  Mechanical Traction      0     mins  19249  Dry Needling     0     mins self-pay  Electrical Stimulation:    0     mins  87032 ( );      Timed Treatment:   32   mins   Total Treatment:     32   mins    Karson Burton PT  Physical Therapist    Electronically signed 2022    [place] License: PT - [id number]

## 2022-07-09 NOTE — PROGRESS NOTES
Psychiatric  Cardiology progress Note    Patient Name: Vanessa Giang  : 1980    CHIEF COMPLAINT  Atypical chest pain        Subjective   Subjective     HISTORY OF PRESENT ILLNESS    Vanessa Giang is a 41 y.o. female with history of precordial atypical chest pain.  No chest pain or shortness of breath.  She has some occasional palpitations    Review of Systems:        Constitutional no fever,  no weight loss   Skin no rash   Otolaryngeal no difficulty swallowing   Cardiovascular See HPI   Pulmonary no cough, no sputum production   Gastrointestinal no constipation, no diarrhea   Genitourinary no dysuria, no hematuria   Hematologic no easy bruisability, no abnormal bleeding   Musculoskeletal no muscle pain   Neurologic no dizziness, no falls                                           Personal History     Social History:    reports that she quit smoking about 19 months ago. She has never used smokeless tobacco. She reports that she does not drink alcohol and does not use drugs.    Home Medications:  Current Outpatient Medications on File Prior to Visit   Medication Sig   • ascorbic acid (VITAMIN C) 250 MG tablet Take 1,000 mg by mouth.   • cholecalciferol (VITAMIN D3) 25 MCG (1000 UT) tablet Take 5,000 Units by mouth Daily.   • coenzyme Q10 100 MG capsule CoQ-10 100 mg capsule   Take 1 capsule by oral route for 90 days.   • Magnesium Aspartate 65 MG tablet   mg, Oral, BID, 0 Refill(s)   • magnesium gluconate 250 MG tablet tablet   Oral, BID, 0 Refill(s)   • thyroid (ARMOUR) 15 MG tablet [place]lin Thyroid 15 mg tablet   Take 1 tablet every day by oral route in the morning for 30 days.   • vitamin D (ERGOCALCIFEROL) 1.25 MG (09401 UT) capsule capsule      No current facility-administered medications on file prior to visit.       Past Medical History:   Diagnosis Date   • Hyperlipidemia        Allergies:  Allergies   Allergen Reactions   • Avocado Rash   • Benzalkonium Chloride Rash   •  Formaldehyde Rash   • Gold Rash   • Latex Rash       Objective    Objective       Vitals:   Heart Rate:  [76] 76  BP: (124)/(54) 124/54  Body mass index is 35.55 kg/m².     PHYSICAL EXAM:    General Appearance:   · well developed  · well nourished  HENT:   · oropharynx moist  · lips not cyanotic  Neck:  · thyroid not enlarged  · supple  Respiratory:  · no respiratory distress  · normal breath sounds  · no rales  Cardiovascular:  · no jugular venous distention  · regular rhythm  · apical impulse normal  · S1 normal, S2 normal  · no S3, no S4   · no murmur  · no rub, no thrill  · carotid pulses normal; no bruit  · pedal pulses normal  · lower extremity edema: none    Skin:   · warm, dry  Psychiatric:  · judgement and insight appropriate  · normal mood and affect        Result Review:  I have personally reviewed the available results from  [x]  Laboratory  [x]  EKG  [x]  Cardiology  [x]  Medications  [x]  Old records  []  Other:     Procedures  Results for orders placed in visit on 12/23/21    Adult Transthoracic Echo Complete W/ Cont if Necessary Per Protocol    Interpretation Summary  Mild left ventricular hypertrophy with normal left-ventricular systolic function.  Mild mitral regurgitation.  Trace tricuspid regurgitation.     Impression/Plan:  1.  Precordial atypical chest pain: Treadmill stress test to evaluate for any significant ischemia.  Echocardiogram to evaluate left ventricular systolic function.  2.  Cushing's disease: Continue thyroid replacement.  Being evaluated in the [facility].  3.  Occasional palpitations: Stable.      Bc Burnett MD   07/12/22   10:30 EDT

## 2022-07-12 ENCOUNTER — HOSPITAL ENCOUNTER (OUTPATIENT)
Dept: MAMMOGRAPHY | Facility: HOSPITAL | Age: 42
Discharge: HOME | End: 2022-07-12
Admitting: OBSTETRICS & GYNECOLOGY

## 2022-07-12 ENCOUNTER — TREATMENT (OUTPATIENT)
Dept: PHYSICAL THERAPY | Facility: CLINIC | Age: 42
End: 2022-07-12

## 2022-07-12 ENCOUNTER — OFFICE VISIT (OUTPATIENT)
Dept: CARDIOLOGY | Facility: CLINIC | Age: 42
End: 2022-07-12

## 2022-07-12 VITALS
HEART RATE: 76 BPM | WEIGHT: 227 LBS | SYSTOLIC BLOOD PRESSURE: 124 MMHG | BODY MASS INDEX: 35.63 KG/M2 | DIASTOLIC BLOOD PRESSURE: 54 MMHG | HEIGHT: 67 IN

## 2022-07-12 DIAGNOSIS — Z12.31 VISIT FOR SCREENING MAMMOGRAM: ICD-10-CM

## 2022-07-12 DIAGNOSIS — R00.2 PALPITATIONS: ICD-10-CM

## 2022-07-12 DIAGNOSIS — R07.9 CHEST PAIN, UNSPECIFIED TYPE: Primary | ICD-10-CM

## 2022-07-12 DIAGNOSIS — E24.9 CUSHING'S SYNDROME: Primary | ICD-10-CM

## 2022-07-12 DIAGNOSIS — R53.1 WEAKNESS: ICD-10-CM

## 2022-07-12 PROCEDURE — 77063 BREAST TOMOSYNTHESIS BI: CPT

## 2022-07-12 PROCEDURE — 97110 THERAPEUTIC EXERCISES: CPT | Performed by: PHYSICAL THERAPIST

## 2022-07-12 PROCEDURE — 99213 OFFICE O/P EST LOW 20 MIN: CPT | Performed by: SPECIALIST

## 2022-07-12 PROCEDURE — 97530 THERAPEUTIC ACTIVITIES: CPT | Performed by: PHYSICAL THERAPIST

## 2022-07-12 PROCEDURE — 77067 SCR MAMMO BI INCL CAD: CPT

## 2022-07-12 RX ORDER — MULTIVIT,TX WITH IRON,MINERALS
TABLET, EXTENDED RELEASE ORAL
COMMUNITY
Start: 2022-06-16

## 2022-07-12 NOTE — PROGRESS NOTES
Physical Therapy Daily Treatment Note    Patient: Vanessa Giang   : 1980  Diagnosis/ICD-10 Code:  Cushing's syndrome (HCC) [E24.9]  Referring practitioner: JEFFERSON Burton  Date of Initial Visit: Type: THERAPY  Noted: 2022  Today's Date: 2022  Patient seen for 4 sessions           Subjective   The patient reported that her pain level today is an 8/10. She is much more fatigued than normal. She had a lot difficulty sleeping last night.     Objective   See Exercise, Manual, and Modality Logs for complete treatment.     Assessment/Plan  The patient demonstrated good tolerance to all interventions today. Continue to progress functional strengthening per patient tolerance.       Timed:  Manual Therapy:    0     mins  98039;  Therapeutic Exercise:    24     mins  15376;     Neuromuscular Álvaro:   0    mins  02918;    Therapeutic Activity:     8     mins  76860;     Gait Trainin     mins  24134;     Aquatics                         0      mins  36441    Un-timed:  Mechanical Traction      0     mins  19833  Dry Needling     0     mins self-pay  Electrical Stimulation:    0     mins  83970 ( );      Timed Treatment:   32   mins   Total Treatment:     32   mins    Karson Burton PT  Physical Therapist    Electronically signed 2022    [place] License: PT - [id number]

## 2022-07-15 ENCOUNTER — TREATMENT (OUTPATIENT)
Dept: PHYSICAL THERAPY | Facility: CLINIC | Age: 42
End: 2022-07-15

## 2022-07-15 DIAGNOSIS — R53.1 WEAKNESS: ICD-10-CM

## 2022-07-15 DIAGNOSIS — E24.9 CUSHING'S SYNDROME: Primary | ICD-10-CM

## 2022-07-15 PROCEDURE — 97530 THERAPEUTIC ACTIVITIES: CPT | Performed by: PHYSICAL THERAPIST

## 2022-07-15 PROCEDURE — 97110 THERAPEUTIC EXERCISES: CPT | Performed by: PHYSICAL THERAPIST

## 2022-07-15 NOTE — PROGRESS NOTES
Physical Therapy Daily Treatment Note      Patient: Vanessa Giang   : 1980  Referring practitioner: JEFFERSON Burton  Date of Initial Visit: Type: THERAPY  Noted: 2022  Today's Date: 7/15/2022  Patient seen for 5 sessions           Subjective Questionnaire:       Subjective Evaluation    History of Present Illness    Subjective comment: Pt reported feeling tired and sleepy today.       Objective   See Exercise, Manual, and Modality Logs for complete treatment.       Assessment & Plan     Assessment    Assessment details: Pt performed 3x10 instead of 3x15 with strengthening exercises today secondary to fatigue otherwise tolerated tx well.  Continue strengtheining to return pt to daily work activities.  Pt will return to work for half a day next week.        Visit Diagnoses:    ICD-10-CM ICD-9-CM   1. Cushing's syndrome (HCC)  E24.9 255.0   2. Weakness  R53.1 780.79       Progress per Plan of Care and Progress strengthening /stabilization /functional activity           Timed:  Manual Therapy:         mins  97264;  Therapeutic Exercise:    20     mins  98714;     Neuromuscular Álvaro:        mins  05081;    Therapeutic Activity:     10     mins  29942;     Gait Training:           mins  44431;     Ultrasound:          mins  96833;    Electrical Stimulation:         mins  76583 ( );  Aquatic Therapy          mins  39790    Untimed:  Electrical Stimulation:         mins  96994 ( );  Mechanical Traction:         mins  32264;     Timed Treatment:   30   mins   Total Treatment:     30   mins    Electronically signed    Francy De León PTA  Physical Therapist Assistant    [place] license: [id number]    
Pertinent PMH/PSH/FHx/SHx and Review of Systems contained within:  56 yo m with no pmh BIB daughter for intermittent confusion and unsteady gait since falling 3 weeks ago. No aggravating or relieving factors, No fever/chills, No photophobia/eye pain/changes in vision, No ear pain/sore throat/dysphagia, No chest pain/palpitations, no SOB/cough/wheeze/stridor, No abdominal pain, No N/V/D, no dysuria/frequency/discharge, No neck/back pain, no rash

## 2022-07-18 ENCOUNTER — TELEPHONE (OUTPATIENT)
Dept: PHYSICAL THERAPY | Facility: CLINIC | Age: 42
End: 2022-07-18

## 2022-07-19 ENCOUNTER — TRANSCRIBE ORDERS (OUTPATIENT)
Dept: ADMINISTRATIVE | Facility: HOSPITAL | Age: 42
End: 2022-07-19

## 2022-07-19 DIAGNOSIS — N64.89 BREAST ASYMMETRY: Primary | ICD-10-CM

## 2022-07-21 ENCOUNTER — TREATMENT (OUTPATIENT)
Dept: PHYSICAL THERAPY | Facility: CLINIC | Age: 42
End: 2022-07-21

## 2022-07-21 DIAGNOSIS — E24.9 CUSHING'S SYNDROME: Primary | ICD-10-CM

## 2022-07-21 DIAGNOSIS — R53.1 WEAKNESS: ICD-10-CM

## 2022-07-21 PROCEDURE — 97110 THERAPEUTIC EXERCISES: CPT | Performed by: PHYSICAL THERAPIST

## 2022-07-21 PROCEDURE — 97530 THERAPEUTIC ACTIVITIES: CPT | Performed by: PHYSICAL THERAPIST

## 2022-07-21 NOTE — PROGRESS NOTES
Physical Therapy Daily Treatment Note      Patient: Vanessa Giang   : 1980  Referring practitioner: JEFFERSON Burton  Date of Initial Visit: Type: THERAPY  Noted: 2022  Today's Date: 2022  Patient seen for 6 sessions           Subjective Questionnaire:       Subjective Evaluation    History of Present Illness    Subjective comment: Pt reported not feeling well and feeling exhausted.  Pt reported she is transitioning back to work.  Pt reports she worked half a day on Tuesday and was exhausted and one hour before coming here today.       Objective   See Exercise, Manual, and Modality Logs for complete treatment.       Assessment & Plan     Assessment    Assessment details: Pt struggled thru strengthening exercise today and was fatigued.  Pt worked 30 minutes taking one sitting rest break.  Continue to build pt's strenght and endurance.          Visit Diagnoses:    ICD-10-CM ICD-9-CM   1. Cushing's syndrome (HCC)  E24.9 255.0   2. Weakness  R53.1 780.79       Progress per Plan of Care and Progress strengthening /stabilization /functional activity           Timed:  Manual Therapy:         mins  01105;  Therapeutic Exercise:    21     mins  57422;     Neuromuscular Álvaro:        mins  36775;    Therapeutic Activity:     9     mins  53035;     Gait Training:           mins  37132;     Ultrasound:          mins  63995;    Electrical Stimulation:         mins  11097 ( );  Aquatic Therapy          mins  18717    Untimed:  Electrical Stimulation:         mins  68848 ( );  Mechanical Traction:         mins  85462;     Timed Treatment:   30   mins   Total Treatment:     30   mins    Electronically signed    Francy De León PTA  Physical Therapist Assistant    [place] license: [id number]

## 2022-07-22 ENCOUNTER — HOSPITAL ENCOUNTER (OUTPATIENT)
Dept: MAMMOGRAPHY | Facility: HOSPITAL | Age: 42
Discharge: HOME | End: 2022-07-22

## 2022-07-22 ENCOUNTER — HOSPITAL ENCOUNTER (OUTPATIENT)
Dept: ULTRASOUND IMAGING | Facility: HOSPITAL | Age: 42
Discharge: HOME | End: 2022-07-22

## 2022-07-22 DIAGNOSIS — N64.89 BREAST ASYMMETRY: ICD-10-CM

## 2022-07-22 PROCEDURE — G0279 TOMOSYNTHESIS, MAMMO: HCPCS

## 2022-07-22 PROCEDURE — 76642 ULTRASOUND BREAST LIMITED: CPT

## 2022-07-22 PROCEDURE — 77065 DX MAMMO INCL CAD UNI: CPT

## 2022-07-25 ENCOUNTER — TREATMENT (OUTPATIENT)
Dept: PHYSICAL THERAPY | Facility: CLINIC | Age: 42
End: 2022-07-25

## 2022-07-25 DIAGNOSIS — E24.9 CUSHING'S SYNDROME: Primary | ICD-10-CM

## 2022-07-25 DIAGNOSIS — R53.1 WEAKNESS: ICD-10-CM

## 2022-07-25 PROCEDURE — 97530 THERAPEUTIC ACTIVITIES: CPT | Performed by: PHYSICAL THERAPIST

## 2022-07-25 PROCEDURE — 97110 THERAPEUTIC EXERCISES: CPT | Performed by: PHYSICAL THERAPIST

## 2022-07-25 NOTE — PROGRESS NOTES
Physical Therapy Daily Treatment Note    Patient: Vanessa Giang   : 1980  Diagnosis/ICD-10 Code:  Cushing's syndrome (HCC) [E24.9]  Referring practitioner: JEFFERSON Burton  Date of Initial Visit: Type: THERAPY  Noted: 2022  Today's Date: 2022  Patient seen for 7 sessions           Subjective   The patient reported that she has felt weaker over the past few weeks. She's not sure if PT is helping.    Objective   See Exercise, Manual, and Modality Logs for complete treatment.     Assessment/Plan  The patient continues to demonstrate good tolerance to all strengthening exercise. Her session was shorter than normal today due to her needing to leave for another appointment.       Timed:  Manual Therapy:    0     mins  83276;  Therapeutic Exercise:    15     mins  63943;     Neuromuscular Álvaro:   0    mins  12516;    Therapeutic Activity:     10     mins  64489;     Gait Trainin     mins  04499;     Aquatics                         0      mins  85701    Un-timed:  Mechanical Traction      0     mins  03453  Dry Needling     0     mins self-pay  Electrical Stimulation:    0     mins  77029 ( );      Timed Treatment:   25   mins   Total Treatment:     25   mins    Karson Burton PT  Physical Therapist    Electronically signed 2022    [place] License: PT - [id number]

## 2022-07-28 ENCOUNTER — TREATMENT (OUTPATIENT)
Dept: PHYSICAL THERAPY | Facility: CLINIC | Age: 42
End: 2022-07-28

## 2022-07-28 DIAGNOSIS — E24.9 CUSHING'S SYNDROME: Primary | ICD-10-CM

## 2022-07-28 DIAGNOSIS — R53.1 WEAKNESS: ICD-10-CM

## 2022-07-28 PROCEDURE — 97110 THERAPEUTIC EXERCISES: CPT | Performed by: PHYSICAL THERAPIST

## 2022-07-28 PROCEDURE — 97530 THERAPEUTIC ACTIVITIES: CPT | Performed by: PHYSICAL THERAPIST

## 2022-07-28 NOTE — PROGRESS NOTES
"Progress Note      Patient: Vanessa Giang   : 1980  Diagnosis/ICD-10 Code:  Cushing's syndrome (HCC) [E24.9]  Referring practitioner: JEFFERSON Burton  Date of Initial Visit: Type: THERAPY  Noted: 2022  Today's Date: 2022  Patient seen for 8 sessions      Subjective:   Subjective Questionnaire:LEFS:  = 31.25% Function  Clinical Progress: improved  Home Program Compliance: Yes  Treatment has included: therapeutic exercise and therapeutic activity    Subjective   The patient reported that her pain level is an 8/10 today. Today is a bad day for pain. She has worked at her desk the past two days and is more sore today. She doesn't notice anything in particular that has improved over the past month. She has just been sore for the past month. She would like to continue with PT at this time because she can tell that she is still weak.    Objective          Tenderness     Additional Tenderness Details  Tenderness to palpation of bilateral upper trapezius, thoracic paraspinals, lumbar paraspinals, sacral border, and piriformis.    Neurological Testing     Additional Neurological Details  Sensation to light touch intact and equal bilaterally from C2-T1 dermatomal distribution.    Sensation to light touch intact and equal bilaterally from L2-S1 dermatomal distribution.    Active Range of Motion     Additional Active Range of Motion Details  Bilateral shoulder AROM grossly WFL, but patient noted \"tightness\" with all directions of movement.  Lumbar AROM grossly WFL, but patient noted \"tightness\" with all directions of movement.    Strength/Myotome Testing     Left Shoulder     Planes of Motion   Flexion: 4-   Abduction: 4-   External rotation at 0°: 4+   Internal rotation at 0°: 4+     Right Shoulder     Planes of Motion   Flexion: 4-   Abduction: 4-   External rotation at 0°: 4+   Internal rotation at 0°: 4+     Left Hip   Planes of Motion   Flexion: 3+  Extension: 3+  Abduction: " 4-    Right Hip   Planes of Motion   Flexion: 4-  Extension: 4-  Abduction: 4-    Left Knee   Flexion: 4  Extension: 4+    Right Knee   Flexion: 4  Extension: 4+    Left Ankle/Foot   Dorsiflexion: 4+    Right Ankle/Foot   Dorsiflexion: 4+    Ambulation     Ambulation: Level Surfaces     Additional Level Surfaces Ambulation Details  The patient ambulates with a bilateral Trendelenburg gait pattern.    Functional Assessment     Comments  2 minute walk test: 370 feet without an AD      See Exercise, Manual, and Modality Logs for complete treatment.     Assessment & Plan     Assessment    Assessment details: The patient was re-evaluated today and presents with improvements in bilateral shoulder strength, bilateral knee strength, bilateral hip strength, 2 minute walk test, and function (MANINDER). Although improved, she continues to present with deficits in shoulder strength, hip strength, and function. She would benefit from continued skilled physical therapy to address remaining functional deficits and to allow the patient to return to her prior level of function.    Goals  Plan Goals: . The patient complains of global joint pain.              LTG 1: 12 weeks:  The patient will report a pain rating of 3/10 or better in order to improve  tolerance to activities of daily living and improve sleep quality.                          STATUS:  Progressing              STG 1a: 6 weeks:  The patient will report a pain rating of 5/10 or better.                          STATUS:  Progressing              TREATMENT:  Therapeutic exercises, manual therapy, aquatic therapy, home exercise   instruction, and modalities as needed for pain to include:  electrical stimulation, moist heat, ice,   ultrasound, and diathermy.    2. The patient demonstrates weakness of the bilateral hips.              LTG 2: 12 weeks:  The patient will demonstrate 4+/5 strength for bilateral hip flexion, abduction,  and extension in order to improve hip stability.                           STATUS:  Progressing              STG 2a: 6 weeks:  The patient will demonstrate 4/5 strength for bilateral hip flexion, abduction,  and extension.                          STATUS:  Progressing              TREATMENT: Therapeutic exercises, manual therapy, aquatic therapy, home exercise instruction,  and modalities as needed for pain to include:  electrical stimulation, moist heat, ice, and ultrasound    3. The patient demonstrates weakness of the bilateral shoulders.              LTG 3: 12 weeks:  The patient will demonstrate 4+/5 strength for bilateral shoulder flexion, abduction,  and external rotation in order to improve shoulder stability.                          STATUS:  Progressing              STG 3a: 6 weeks: The patient will demonstrate 4/5 strength for bilateral shoulder flexion, abduction,  and external rotation.                          STATUS:  Progressing              TREATMENT: Therapeutic exercises, manual therapy, aquatic therapy, home exercise instruction,  and modalities as needed for pain to include:  electrical stimulation, moist heat, ice, and ultrasound    4. Mobility: Walking/Moving Around Functional Limitation                               LTG 4: 12 weeks:  The patient will demonstrate 50% limitation by achieving a score of 40/80 on the Lower Extremity Functional Scale.                          STATUS:  Progressing              STG 4a: 6 weeks:  The patient will demonstrate 62.5% limitation by achieving a score of 30/80 on the Lower Extremity Functional Scale.                            STATUS:  Progressing              TREATMENT:  Manual therapy, therapeutic exercise, home exercise instruction, and modalities as needed to include: moist heat, electrical stimulation, and ultrasound.    5. Mobility: Walking/Moving Around Functional Limitation                               LTG 5: 12 weeks:  The patient will demonstrate improved tolerance to community ambulation by  traveling 400 feet or greater in 2 minutes.                          STATUS:  Progressing              TREATMENT:  Manual therapy, therapeutic exercise, home exercise instruction, and modalities as needed to include: moist heat, electrical stimulation, and ultrasound.    Plan  Therapy options: will be seen for skilled therapy services      Progress toward previous goals: Partially Met      Recommendations: Continue as planned  Timeframe: 1 month  Prognosis to achieve goals: good    PT Signature: Karson Burton PT    Electronically signed 2022    [place] License: PT - [id number]       Timed:  Manual Therapy:    0     mins  35409;  Therapeutic Exercise:    30     mins  94434;     Neuromuscular Álvaro:    0    mins  69175;    Therapeutic Activity:     10     mins  91783;     Gait Trainin     mins  95043;     Aquatics                         0      mins  17639    Un-timed:  Mechanical Traction      0     mins  33202  Dry Needling     0     mins self-pay  Electrical Stimulation:    0     mins  74925 ( );    Timed Treatment:   40   mins   Total Treatment:     40   mins

## 2022-08-01 ENCOUNTER — TREATMENT (OUTPATIENT)
Dept: PHYSICAL THERAPY | Facility: CLINIC | Age: 42
End: 2022-08-01

## 2022-08-01 DIAGNOSIS — R53.1 WEAKNESS: ICD-10-CM

## 2022-08-01 DIAGNOSIS — E24.9 CUSHING'S SYNDROME: Primary | ICD-10-CM

## 2022-08-01 PROCEDURE — 97110 THERAPEUTIC EXERCISES: CPT | Performed by: PHYSICAL THERAPIST

## 2022-08-01 PROCEDURE — 97530 THERAPEUTIC ACTIVITIES: CPT | Performed by: PHYSICAL THERAPIST

## 2022-08-01 NOTE — PROGRESS NOTES
Physical Therapy Daily Treatment Note      Patient: Vanessa Giang   : 1980  Referring practitioner: JEFFERSON Burton  Date of Initial Visit: Type: THERAPY  Noted: 2022  Today's Date: 2022  Patient seen for 9 sessions           Subjective Questionnaire:       Subjective Evaluation    History of Present Illness    Subjective comment: Pt reports being real tired.  I asked pt if she felt any different than last week when she had worked two days in a row and she said it's the same.         Objective   See Exercise, Manual, and Modality Logs for complete treatment.       Assessment & Plan     Assessment    Assessment details: Pt reports fatigue with all strengthening exercise.  Pt was able to add a third set of 14 on marching with band.  Pt reported continued fatigue after tx and had no c/o pain.  Continue with POC.        Visit Diagnoses:    ICD-10-CM ICD-9-CM   1. Cushing's syndrome (HCC)  E24.9 255.0   2. Weakness  R53.1 780.79       Progress per Plan of Care and Progress strengthening /stabilization /functional activity           Timed:  Manual Therapy:         mins  12281;  Therapeutic Exercise:    25     mins  70923;     Neuromuscular Álvaro:        mins  78836;    Therapeutic Activity:     10     mins  22334;     Gait Training:           mins  66789;     Ultrasound:          mins  46535;    Electrical Stimulation:         mins  70756 ( );  Aquatic Therapy          mins  14685    Untimed:  Electrical Stimulation:         mins  19796 ( );  Mechanical Traction:         mins  07597;     Timed Treatment:   35   mins   Total Treatment:     35   mins    Electronically signed    Francy De León PTA  Physical Therapist Assistant    [place] license: [id number]

## 2022-08-04 ENCOUNTER — TREATMENT (OUTPATIENT)
Dept: PHYSICAL THERAPY | Facility: CLINIC | Age: 42
End: 2022-08-04

## 2022-08-04 DIAGNOSIS — E24.9 CUSHING'S SYNDROME: Primary | ICD-10-CM

## 2022-08-04 DIAGNOSIS — R53.1 WEAKNESS: ICD-10-CM

## 2022-08-04 PROCEDURE — 97530 THERAPEUTIC ACTIVITIES: CPT | Performed by: PHYSICAL THERAPIST

## 2022-08-04 PROCEDURE — 97110 THERAPEUTIC EXERCISES: CPT | Performed by: PHYSICAL THERAPIST

## 2022-08-04 NOTE — PROGRESS NOTES
Physical Therapy Daily Treatment Note      Patient: Vanessa Giang   : 1980  Referring practitioner: JEFFERSON Burton  Date of Initial Visit: Type: THERAPY  Noted: 2022  Today's Date: 2022  Patient seen for 10 sessions           Subjective Questionnaire:       Subjective Evaluation    History of Present Illness    Subjective comment: Pt reports she hurts today and is tired.  Pt reports in the last week her R arm is tingly to the elbow and then elbow to fingers go numb.  Pt reports her L side from low back to toes goees numb.  Pt reports numbness doesn't last it comes and goes.  Pt reports when she drives her R arm starts getting numb.       Objective   See Exercise, Manual, and Modality Logs for complete treatment.       Assessment & Plan     Assessment    Assessment details: Pt reported fatigue the entire session but continued with strengthening exercise.  Pt reported she had been to work on her way to PT just long enough to fill in a co-worker.  Pt wants to continue strengthening.        Visit Diagnoses:    ICD-10-CM ICD-9-CM   1. Cushing's syndrome (HCC)  E24.9 255.0   2. Weakness  R53.1 780.79                  Timed:  Manual Therapy:         mins  82050;  Therapeutic Exercise:    23     mins  37907;     Neuromuscular Álvaro:        mins  97816;    Therapeutic Activity:     8     mins  92661;     Gait Training:           mins  12255;     Ultrasound:          mins  06155;    Electrical Stimulation:         mins  91524 ( );  Aquatic Therapy          mins  66959    Untimed:  Electrical Stimulation:         mins  00694 ( );  Mechanical Traction:         mins  45777;     Timed Treatment:   31   mins   Total Treatment:     31   mins    Electronically signed    Francy De León PTA  Physical Therapist Assistant    [place] license: [id number]

## 2022-08-10 ENCOUNTER — OFFICE VISIT (OUTPATIENT)
Dept: GASTROENTEROLOGY | Facility: CLINIC | Age: 42
End: 2022-08-10

## 2022-08-10 ENCOUNTER — PREP FOR SURGERY (OUTPATIENT)
Dept: OTHER | Facility: HOSPITAL | Age: 42
End: 2022-08-10

## 2022-08-10 VITALS
HEIGHT: 67 IN | BODY MASS INDEX: 34.81 KG/M2 | TEMPERATURE: 97.3 F | SYSTOLIC BLOOD PRESSURE: 99 MMHG | DIASTOLIC BLOOD PRESSURE: 67 MMHG | WEIGHT: 221.8 LBS

## 2022-08-10 DIAGNOSIS — R12 HEARTBURN: Primary | ICD-10-CM

## 2022-08-10 DIAGNOSIS — K22.70 BARRETT'S ESOPHAGUS WITHOUT DYSPLASIA: ICD-10-CM

## 2022-08-10 DIAGNOSIS — R10.12 LEFT UPPER QUADRANT ABDOMINAL PAIN: ICD-10-CM

## 2022-08-10 PROCEDURE — 99203 OFFICE O/P NEW LOW 30 MIN: CPT | Performed by: INTERNAL MEDICINE

## 2022-08-10 RX ORDER — DOXYCYCLINE HYCLATE 100 MG
TABLET ORAL
COMMUNITY
End: 2022-09-13

## 2022-08-10 RX ORDER — HYDROCORTISONE 20 MG/1
20 TABLET ORAL DAILY
COMMUNITY
Start: 2022-07-16 | End: 2023-03-01

## 2022-08-11 ENCOUNTER — TELEPHONE (OUTPATIENT)
Dept: GASTROENTEROLOGY | Facility: CLINIC | Age: 42
End: 2022-08-11

## 2022-08-11 NOTE — TELEPHONE ENCOUNTER
Call to [facility] Med Records @ [phone] and spoke with Swati. Request egd/path report be faxed to [phone].

## 2022-08-11 NOTE — TELEPHONE ENCOUNTER
----- Message from Vanessa Giang sent at 8/10/2022  7:40 PM EDT -----  Regarding: Summary notes  Good evening,   While reading through the summary notes in my chart, I read to stop taking magnesium aspartate. I don’t recall us discussing that and wondered what was your reason?  Thanks  Vanessa

## 2022-08-11 NOTE — TELEPHONE ENCOUNTER
----- Message from Ida Stevens MD sent at 8/10/2022  1:56 PM EDT -----  Please request previous EGD and path from the [facility]-was several years ago-they think 2016 but nothing in care everywhere

## 2022-08-11 NOTE — TELEPHONE ENCOUNTER
I di not cancel that medication - it may have been listed as a duplicate and was listed as cancelled when the list was reconciled during check in.

## 2022-08-12 ENCOUNTER — TREATMENT (OUTPATIENT)
Dept: PHYSICAL THERAPY | Facility: CLINIC | Age: 42
End: 2022-08-12

## 2022-08-12 DIAGNOSIS — R53.1 WEAKNESS: ICD-10-CM

## 2022-08-12 DIAGNOSIS — E24.9 CUSHING'S SYNDROME: Primary | ICD-10-CM

## 2022-08-12 PROCEDURE — 97110 THERAPEUTIC EXERCISES: CPT | Performed by: PHYSICAL THERAPIST

## 2022-08-12 PROCEDURE — 97530 THERAPEUTIC ACTIVITIES: CPT | Performed by: PHYSICAL THERAPIST

## 2022-08-12 NOTE — PROGRESS NOTES
Physical Therapy Daily Treatment Note        Patient: Vanessa Giang   : 1980  Diagnosis/ICD-10 Code:  Cushing's syndrome (HCC) [E24.9]  Referring practitioner: JEFFERSON Burton  Date of Initial Visit: Type: THERAPY  Noted: 2022  Today's Date: 2022  Patient seen for 11 sessions             Subjective   Vanessa Giang reports: being tired today, usually sore and tired when she leaves PT sessions. States that her knee is still bothering her a little bit today.      Objective    Minimal rest breaks throughout PT session.     See Exercise, Manual, and Modality Logs for complete treatment.       Assessment/Plan  Pt usually sore and tired following PT sessions, although tolerated exercises well. Pt would benefit from skilled PT to address strength and endurance deficits, and any concerns with ADLs.     Progress per Plan of Care           Timed:  Manual Therapy:         mins  84103;  Therapeutic Exercise:    23     mins  53457;     Neuromuscular Álvaro:        mins  10085;    Therapeutic Activity:     8     mins  26266;     Gait Training:           mins  02026;    Aquatic Therapy:          mins  38885;       Untimed:  Electrical Stimulation:         mins  36971 ( );  Mechanical Traction:         mins  08113;       Timed Treatment:   31   mins   Total Treatment:     31   mins      Electronically signed:   Arabella Savage PTA  Physical Therapist Assistant  [place] License #: [id number]

## 2022-08-15 ENCOUNTER — TREATMENT (OUTPATIENT)
Dept: PHYSICAL THERAPY | Facility: CLINIC | Age: 42
End: 2022-08-15

## 2022-08-15 DIAGNOSIS — E24.9 CUSHING'S SYNDROME: Primary | ICD-10-CM

## 2022-08-15 DIAGNOSIS — R53.1 WEAKNESS: ICD-10-CM

## 2022-08-15 PROCEDURE — 97110 THERAPEUTIC EXERCISES: CPT | Performed by: PHYSICAL THERAPIST

## 2022-08-15 PROCEDURE — 97530 THERAPEUTIC ACTIVITIES: CPT | Performed by: PHYSICAL THERAPIST

## 2022-08-15 NOTE — PROGRESS NOTES
Physical Therapy Daily Treatment Note      Patient: Vanessa Giang   : 1980  Referring practitioner: JEFFERSON Burton  Date of Initial Visit: Type: THERAPY  Noted: 2022  Today's Date: 8/15/2022  Patient seen for 12 sessions           Subjective  Vanessa Giang reports: she is discouraged as she doesn't see much improvement, voicing so many obstacles at the time.       Objective   See Exercise, Manual, and Modality Logs for complete treatment.       Assessment/Plan  Encouragement given during session. She tolerated advancement in resistance utilized during her session revealing strength gains. Therapist directed advancement remains needed to further address deficits in strength and functional ability.    Visit Diagnoses:    ICD-10-CM ICD-9-CM   1. Cushing's syndrome (HCC)  E24.9 255.0   2. Weakness  R53.1 780.79       Progress per Plan of Care and Progress strengthening /stabilization /functional activity           Timed:  Manual Therapy:         mins  61324;  Therapeutic Exercise:    24     mins  46172;     Neuromuscular Álvaro:        mins  79273;    Therapeutic Activity:     9     mins  99555;     Gait Training:           mins  06337;     Ultrasound:          mins  08877;    Electrical Stimulation:         mins  15985 ( );  Aquatics  __   mins   29823    Untimed:  Electrical Stimulation:         mins  15517 ( );  Mechanical Traction:         mins  96414;     Timed Treatment:   32   mins   Total Treatment:     32   mins    Electronically Signed:  Stormy Bhagat PTA  Physical Therapist Assistant    [facility] license [id number]

## 2022-08-19 ENCOUNTER — TREATMENT (OUTPATIENT)
Dept: PHYSICAL THERAPY | Facility: CLINIC | Age: 42
End: 2022-08-19

## 2022-08-19 DIAGNOSIS — E24.9 CUSHING'S SYNDROME: Primary | ICD-10-CM

## 2022-08-19 DIAGNOSIS — R53.1 WEAKNESS: ICD-10-CM

## 2022-08-19 PROCEDURE — 97112 NEUROMUSCULAR REEDUCATION: CPT | Performed by: PHYSICAL THERAPIST

## 2022-08-19 PROCEDURE — 97530 THERAPEUTIC ACTIVITIES: CPT | Performed by: PHYSICAL THERAPIST

## 2022-08-19 PROCEDURE — 97110 THERAPEUTIC EXERCISES: CPT | Performed by: PHYSICAL THERAPIST

## 2022-08-19 NOTE — PROGRESS NOTES
Physical Therapy Daily Treatment Note        Patient: Vanessa Giang   : 1980  Diagnosis/ICD-10 Code:  Cushing's syndrome (HCC) [E24.9]  Referring practitioner: JEFFERSON Burton  Date of Initial Visit: Type: THERAPY  Noted: 2022  Today's Date: 2022  Patient seen for 13 sessions             Subjective   Vanessa Giang reports: being extra tired today from being at work every day this morning. States that the tops of her legs have been bothering her the last few days, report that sometimes when she stands up her legs don't feel like they will hold her.     Objective   Increased fatigue throughout PT session.    See Exercise, Manual, and Modality Logs for complete treatment.       Assessment/Plan  Pt overall tired from work week, fatigued throughout PT session. Pt would benefit from skilled PT to address strength and endurance deficits, and any concerns with ADLs.     Progress per Plan of Care           Timed:  Manual Therapy:         mins  62336;  Therapeutic Exercise:    10     mins  86182;     Neuromuscular Álvaro:    8    mins  98019;    Therapeutic Activity:     10     mins  36235;     Gait Training:           mins  70813;    Aquatic Therapy:          mins  69958;       Untimed:  Electrical Stimulation:         mins  73732 ( );  Mechanical Traction:         mins  12128;       Timed Treatment:   28   mins   Total Treatment:     28   mins      Electronically signed:   Arabella Savage PTA  Physical Therapist Assistant  [place] [facility] License #: [id number]

## 2022-08-23 ENCOUNTER — TREATMENT (OUTPATIENT)
Dept: PHYSICAL THERAPY | Facility: CLINIC | Age: 42
End: 2022-08-23

## 2022-08-23 DIAGNOSIS — R53.1 WEAKNESS: ICD-10-CM

## 2022-08-23 DIAGNOSIS — E24.9 CUSHING'S SYNDROME: Primary | ICD-10-CM

## 2022-08-23 PROCEDURE — 97110 THERAPEUTIC EXERCISES: CPT | Performed by: PHYSICAL THERAPIST

## 2022-08-23 PROCEDURE — 97530 THERAPEUTIC ACTIVITIES: CPT | Performed by: PHYSICAL THERAPIST

## 2022-08-23 NOTE — PROGRESS NOTES
Physical Therapy Daily Treatment Note        Patient: Vanessa Giang   : 1980  Diagnosis/ICD-10 Code:  Cushing's syndrome (HCC) [E24.9]  Referring practitioner: JEFFERSON Burton  Date of Initial Visit: Type: THERAPY  Noted: 2022  Today's Date: 2022  Patient seen for 14 sessions             Subjective   Vanessa Giang reports: being exhausted from working half days the last week. States that she hasn't been sleeping well the last week, she's in pain so can't get comfortable.     Objective  Increased discomfort in L knee with step ups.     See Exercise, Manual, and Modality Logs for complete treatment.       Assessment/Plan  Vanessa still experiencing increased fatigue even with shortened work days. Pt tolerated exercises well, although increased discomfort in L knee with step ups. Pt would benefit from skilled PT to address strength and endurance deficits, and any concerns with ADLs.    Progress per Plan of Care           Timed:  Manual Therapy:         mins  90284;  Therapeutic Exercise:    23     mins  37647;     Neuromuscular Álvaro:        mins  84486;    Therapeutic Activity:     8     mins  70282;     Gait Training:           mins  12204;    Aquatic Therapy:          mins  01267;       Untimed:  Electrical Stimulation:         mins  21064 ( );  Mechanical Traction:         mins  21632;       Timed Treatment:   31   mins   Total Treatment:     31   mins      Electronically signed:   Arabella Savage PTA  Physical Therapist Assistant  [facility] License #: [id number]

## 2022-08-25 ENCOUNTER — TREATMENT (OUTPATIENT)
Dept: PHYSICAL THERAPY | Facility: CLINIC | Age: 42
End: 2022-08-25

## 2022-08-25 DIAGNOSIS — E24.9 CUSHING'S SYNDROME: Primary | ICD-10-CM

## 2022-08-25 DIAGNOSIS — R53.1 WEAKNESS: ICD-10-CM

## 2022-08-25 PROCEDURE — 97110 THERAPEUTIC EXERCISES: CPT | Performed by: PHYSICAL THERAPIST

## 2022-08-25 NOTE — PROGRESS NOTES
"Progress Note / Discharge      Patient: Vanessa Giang   : 1980  Diagnosis/ICD-10 Code:  Cushing's syndrome (HCC) [E24.9]  Referring practitioner: JEFFERSON Burton  Date of Initial Visit: Type: THERAPY  Noted: 2022  Today's Date: 2022  Patient seen for 15 sessions      Subjective:   Subjective Questionnaire: LEFS:  = 32.5% Function  Clinical Progress: unchanged  Home Program Compliance: Yes  Treatment has included: therapeutic exercise and therapeutic activity    Subjective   The patient reported that her pain level today is a 7-8/10. She returned to work about 3 weeks ago and her pain has consistently been worse since then. She doesn't recall anything in particular that has improved since starting PT. She reluctantly consented to discharge from PT and will continue with her home exercise program.     Objective          Tenderness     Additional Tenderness Details  Tenderness to palpation of bilateral upper trapezius, thoracic paraspinals, lumbar paraspinals, sacral border, and piriformis.    Neurological Testing     Additional Neurological Details  Sensation to light touch intact and equal bilaterally from C2-T1 dermatomal distribution.    Sensation to light touch intact and equal bilaterally from L2-S1 dermatomal distribution.    Active Range of Motion     Additional Active Range of Motion Details  Bilateral shoulder AROM grossly WFL, but patient noted \"tightness\" with all directions of movement.  Lumbar AROM grossly WFL, but patient noted \"tightness\" with all directions of movement.    Strength/Myotome Testing     Left Shoulder     Planes of Motion   Flexion: 4-   Abduction: 4-   External rotation at 0°: 4+   Internal rotation at 0°: 4+     Right Shoulder     Planes of Motion   Flexion: 4-   Abduction: 4-   External rotation at 0°: 4+   Internal rotation at 0°: 4+     Left Hip   Planes of Motion   Flexion: 3+  Extension: 3+  Abduction: 4-    Right Hip   Planes of Motion "   Flexion: 4-  Extension: 3+  Abduction: 4-    Left Knee   Flexion: 4  Extension: 4+    Right Knee   Flexion: 4  Extension: 4+    Left Ankle/Foot   Dorsiflexion: 4+    Right Ankle/Foot   Dorsiflexion: 4+    Ambulation     Ambulation: Level Surfaces     Additional Level Surfaces Ambulation Details  The patient ambulates with a bilateral Trendelenburg gait pattern.    Functional Assessment     Comments  2 minute walk test: 415 feet without an AD      See Exercise, Manual, and Modality Logs for complete treatment.     Assessment & Plan     Assessment    Assessment details: The patient was re-evaluated today and presents with some improvement in 2 minute walk test distance, but no other objective improvements compared to her previous assessment. She continues to present with a high level of pain and moderate lower extremity weakness. She is appropriate for discharge at this time due to lack of progress with PT.     Goals  Plan Goals: . The patient complains of global joint pain.              LTG 1: 12 weeks:  The patient will report a pain rating of 3/10 or better in order to improve  tolerance to activities of daily living and improve sleep quality.                          STATUS:  Not met              STG 1a: 6 weeks:  The patient will report a pain rating of 5/10 or better.                          STATUS:  Not met              TREATMENT:  Therapeutic exercises, manual therapy, aquatic therapy, home exercise   instruction, and modalities as needed for pain to include:  electrical stimulation, moist heat, ice,   ultrasound, and diathermy.    2. The patient demonstrates weakness of the bilateral hips.              LTG 2: 12 weeks:  The patient will demonstrate 4+/5 strength for bilateral hip flexion, abduction,  and extension in order to improve hip stability.                          STATUS:  Not met              STG 2a: 6 weeks:  The patient will demonstrate 4/5 strength for bilateral hip flexion, abduction,  and  extension.                          STATUS: Not met              TREATMENT: Therapeutic exercises, manual therapy, aquatic therapy, home exercise instruction,  and modalities as needed for pain to include:  electrical stimulation, moist heat, ice, and ultrasound    3. The patient demonstrates weakness of the bilateral shoulders.              LTG 3: 12 weeks:  The patient will demonstrate 4+/5 strength for bilateral shoulder flexion, abduction,  and external rotation in order to improve shoulder stability.                          STATUS: Not met              STG 3a: 6 weeks: The patient will demonstrate 4/5 strength for bilateral shoulder flexion, abduction,  and external rotation.                          STATUS: Not met              TREATMENT: Therapeutic exercises, manual therapy, aquatic therapy, home exercise instruction,  and modalities as needed for pain to include:  electrical stimulation, moist heat, ice, and ultrasound    4. Mobility: Walking/Moving Around Functional Limitation                               LTG 4: 12 weeks:  The patient will demonstrate 50% limitation by achieving a score of 40/80 on the Lower Extremity Functional Scale.                          STATUS:  Not met              STG 4a: 6 weeks:  The patient will demonstrate 62.5% limitation by achieving a score of 30/80 on the Lower Extremity Functional Scale.                            STATUS:  Not met              TREATMENT:  Manual therapy, therapeutic exercise, home exercise instruction, and modalities as needed to include: moist heat, electrical stimulation, and ultrasound.    5. Mobility: Walking/Moving Around Functional Limitation                               LTG 5: 12 weeks:  The patient will demonstrate improved tolerance to community ambulation by traveling 400 feet or greater in 2 minutes.                          STATUS:  Met              TREATMENT:  Manual therapy, therapeutic exercise, home exercise instruction, and modalities  as needed to include: moist heat, electrical stimulation, and ultrasound.    Plan  Therapy options: will not be seen for skilled therapy services      Progress toward previous goals: Partially Met      Recommendations: Discharge      PT Signature: Karson Burton PT    Electronically signed 2022    [place] License: PT - [id number]       Timed:  Manual Therapy:    0     mins  13684;  Therapeutic Exercise:    24     mins  37477;     Neuromuscular Álvaro:    0    mins  53159;    Therapeutic Activity:     0     mins  18012;     Gait Trainin     mins  51926;     Aquatics                         0      mins  81527    Un-timed:  Mechanical Traction      0     mins  38118  Dry Needling     0     mins self-pay  Electrical Stimulation:    0     mins  68055 ( );    Timed Treatment:   24   mins   Total Treatment:     24   mins

## 2022-08-29 ENCOUNTER — APPOINTMENT (OUTPATIENT)
Dept: LAB | Facility: HOSPITAL | Age: 42
End: 2022-08-29

## 2022-08-30 ENCOUNTER — LAB REQUISITION (OUTPATIENT)
Dept: LAB | Facility: HOSPITAL | Age: 42
End: 2022-08-30

## 2022-08-30 DIAGNOSIS — R30.0 DYSURIA: ICD-10-CM

## 2022-08-30 PROCEDURE — 87086 URINE CULTURE/COLONY COUNT: CPT | Performed by: OBSTETRICS & GYNECOLOGY

## 2022-08-30 RX ORDER — VIT C/HESPERIDIN/BIOFLAVONOIDS 500-100 MG
30 TABLET ORAL DAILY
COMMUNITY

## 2022-08-31 ENCOUNTER — HOSPITAL ENCOUNTER (OUTPATIENT)
Facility: HOSPITAL | Age: 42
Setting detail: HOSPITAL OUTPATIENT SURGERY
Discharge: HOME | End: 2022-08-31
Attending: INTERNAL MEDICINE | Admitting: INTERNAL MEDICINE

## 2022-08-31 ENCOUNTER — ANESTHESIA (OUTPATIENT)
Dept: GASTROENTEROLOGY | Facility: HOSPITAL | Age: 42
End: 2022-08-31

## 2022-08-31 ENCOUNTER — ANESTHESIA EVENT (OUTPATIENT)
Dept: GASTROENTEROLOGY | Facility: HOSPITAL | Age: 42
End: 2022-08-31

## 2022-08-31 VITALS
TEMPERATURE: 97.1 F | DIASTOLIC BLOOD PRESSURE: 63 MMHG | RESPIRATION RATE: 16 BRPM | SYSTOLIC BLOOD PRESSURE: 122 MMHG | HEART RATE: 48 BPM | HEIGHT: 67 IN | OXYGEN SATURATION: 98 % | BODY MASS INDEX: 34.15 KG/M2 | WEIGHT: 217.6 LBS

## 2022-08-31 DIAGNOSIS — R10.12 LEFT UPPER QUADRANT ABDOMINAL PAIN: ICD-10-CM

## 2022-08-31 DIAGNOSIS — R12 HEARTBURN: ICD-10-CM

## 2022-08-31 DIAGNOSIS — K22.70 BARRETT'S ESOPHAGUS WITHOUT DYSPLASIA: ICD-10-CM

## 2022-08-31 LAB
B-HCG UR QL: NEGATIVE
EXPIRATION DATE: NORMAL
INTERNAL NEGATIVE CONTROL: NEGATIVE
INTERNAL POSITIVE CONTROL: POSITIVE
Lab: NORMAL

## 2022-08-31 PROCEDURE — 25010000002 PROPOFOL 10 MG/ML EMULSION: Performed by: ANESTHESIOLOGY

## 2022-08-31 PROCEDURE — 43239 EGD BIOPSY SINGLE/MULTIPLE: CPT | Performed by: INTERNAL MEDICINE

## 2022-08-31 PROCEDURE — 81025 URINE PREGNANCY TEST: CPT | Performed by: INTERNAL MEDICINE

## 2022-08-31 PROCEDURE — 88305 TISSUE EXAM BY PATHOLOGIST: CPT | Performed by: INTERNAL MEDICINE

## 2022-08-31 PROCEDURE — 88342 IMHCHEM/IMCYTCHM 1ST ANTB: CPT | Performed by: INTERNAL MEDICINE

## 2022-08-31 PROCEDURE — S0260 H&P FOR SURGERY: HCPCS | Performed by: INTERNAL MEDICINE

## 2022-08-31 RX ORDER — PROPOFOL 10 MG/ML
VIAL (ML) INTRAVENOUS CONTINUOUS PRN
Status: DISCONTINUED | OUTPATIENT
Start: 2022-08-31 | End: 2022-08-31 | Stop reason: SURG

## 2022-08-31 RX ORDER — SODIUM CHLORIDE, SODIUM LACTATE, POTASSIUM CHLORIDE, CALCIUM CHLORIDE 600; 310; 30; 20 MG/100ML; MG/100ML; MG/100ML; MG/100ML
1000 INJECTION, SOLUTION INTRAVENOUS CONTINUOUS
Status: DISCONTINUED | OUTPATIENT
Start: 2022-08-31 | End: 2022-08-31 | Stop reason: HOSPADM

## 2022-08-31 RX ORDER — OMEPRAZOLE 20 MG/1
20 CAPSULE, DELAYED RELEASE ORAL DAILY
Qty: 90 CAPSULE | Refills: 0 | Status: SHIPPED | OUTPATIENT
Start: 2022-08-31 | End: 2022-11-28

## 2022-08-31 RX ORDER — PROPOFOL 10 MG/ML
VIAL (ML) INTRAVENOUS AS NEEDED
Status: DISCONTINUED | OUTPATIENT
Start: 2022-08-31 | End: 2022-08-31 | Stop reason: SURG

## 2022-08-31 RX ORDER — GLYCOPYRROLATE 0.2 MG/ML
INJECTION INTRAMUSCULAR; INTRAVENOUS AS NEEDED
Status: DISCONTINUED | OUTPATIENT
Start: 2022-08-31 | End: 2022-08-31 | Stop reason: SURG

## 2022-08-31 RX ORDER — LIDOCAINE HYDROCHLORIDE 20 MG/ML
INJECTION, SOLUTION INFILTRATION; PERINEURAL AS NEEDED
Status: DISCONTINUED | OUTPATIENT
Start: 2022-08-31 | End: 2022-08-31 | Stop reason: SURG

## 2022-08-31 RX ADMIN — PROPOFOL 80 MG: 10 INJECTION, EMULSION INTRAVENOUS at 10:10

## 2022-08-31 RX ADMIN — Medication 160 MCG/KG/MIN: at 10:10

## 2022-08-31 RX ADMIN — LIDOCAINE HYDROCHLORIDE 40 MG: 20 INJECTION, SOLUTION INFILTRATION; PERINEURAL at 10:09

## 2022-08-31 RX ADMIN — SODIUM CHLORIDE, POTASSIUM CHLORIDE, SODIUM LACTATE AND CALCIUM CHLORIDE 1000 ML: 600; 310; 30; 20 INJECTION, SOLUTION INTRAVENOUS at 09:25

## 2022-08-31 RX ADMIN — GLYCOPYRROLATE 0.2 MG: 0.2 INJECTION INTRAMUSCULAR; INTRAVENOUS at 10:09

## 2022-08-31 NOTE — ANESTHESIA PREPROCEDURE EVALUATION
Anesthesia Evaluation     Patient summary reviewed and Nursing notes reviewed   no history of anesthetic complications:  NPO Solid Status: > 6 hours  NPO Liquid Status: > 6 hours           Airway   Mallampati: II  TM distance: >3 FB  Neck ROM: full  no difficulty expected and No difficulty expected  Dental - normal exam     Pulmonary - normal exam    breath sounds clear to auscultation  (+) sleep apnea,   (-) rhonchi, decreased breath sounds, wheezes, rales, stridor  Cardiovascular - normal exam    NYHA Classification: I  ECG reviewed  Rhythm: regular  Rate: normal    (+) hyperlipidemia,   (-) murmur, weak pulses, friction rub, systolic click, carotid bruits, JVD, peripheral edema      Neuro/Psych- negative ROS  GI/Hepatic/Renal/Endo    (+) obesity,  GERD,  liver disease fatty liver disease, thyroid problem     Musculoskeletal (-) negative ROS    Abdominal  - normal exam    Abdomen: soft.   Substance History - negative use     OB/GYN negative ob/gyn ROS         Other - negative ROS                       Anesthesia Plan    ASA 3     MAC     intravenous induction     Anesthetic plan, risks, benefits, and alternatives have been provided, discussed and informed consent has been obtained with: patient.        CODE STATUS:

## 2022-08-31 NOTE — ANESTHESIA POSTPROCEDURE EVALUATION
"Patient: Vanessa Giang    Procedure Summary     Date: 08/31/22 Room / Location:  [facility]    Anesthesia Start: 1006 Anesthesia Stop: 1023    Procedure: ESOPHAGOGASTRODUODENOSCOPY WITH BX'S (N/A Esophagus) Diagnosis:       Heartburn      Left upper quadrant abdominal pain      Vasquez's esophagus without dysplasia      (Heartburn [R12])      (Left upper quadrant abdominal pain [R10.12])      (Vasquez's esophagus without dysplasia [K22.70])    Surgeons: Ida Stevens MD Provider: Brian Ramirez MD    Anesthesia Type: MAC ASA Status: 3          Anesthesia Type: MAC    Vitals  Vitals Value Taken Time   /63 08/31/22 1041   Temp 36.2 °C (97.1 °F) 08/31/22 1031   Pulse 48 08/31/22 1041   Resp 16 08/31/22 1041   SpO2 98 % 08/31/22 1041           Post Anesthesia Care and Evaluation    Patient location during evaluation: bedside  Patient participation: complete - patient participated  Level of consciousness: awake and alert  Pain management: adequate    Airway patency: patent  Anesthetic complications: No anesthetic complications    Cardiovascular status: acceptable  Respiratory status: acceptable  Hydration status: acceptable    Comments: /63 (BP Location: Left arm, Patient Position: Sitting)   Pulse (!) 48   Temp 36.2 °C (97.1 °F)   Resp 16   Ht 170.2 cm (67\")   Wt 98.7 kg (217 lb 9.6 oz)   LMP 07/04/2022   SpO2 98%   BMI 34.08 kg/m²           "

## 2022-09-01 LAB — BACTERIA SPEC AEROBE CULT: ABNORMAL

## 2022-09-02 LAB
LAB AP CASE REPORT: NORMAL
LAB AP SPECIAL STAINS: NORMAL
PATH REPORT.FINAL DX SPEC: NORMAL
PATH REPORT.GROSS SPEC: NORMAL

## 2022-09-08 NOTE — PROGRESS NOTES
Small bowel biopsies were normal.  Gastric biopsies show chronic focally active inflammation-negative for H. pylori  Esophageal biopsies show mild reflux esophagitis.  Recommend omeprazole 20 mg once daily.  Recommend GERD diet lifestyle modification to reduce acid reflux symptoms.

## 2022-09-12 ENCOUNTER — TELEPHONE (OUTPATIENT)
Dept: GASTROENTEROLOGY | Facility: CLINIC | Age: 42
End: 2022-09-12

## 2022-09-12 NOTE — TELEPHONE ENCOUNTER
----- Message from Ida Stevens MD sent at 9/8/2022  2:38 PM EDT -----  Small bowel biopsies were normal.  Gastric biopsies show chronic focally active inflammation-negative for H. pylori  Esophageal biopsies show mild reflux esophagitis.  Recommend omeprazole 20 mg once daily.  Recommend GERD diet lifestyle modification to reduce acid reflux symptoms.

## 2022-09-13 PROCEDURE — U0004 COV-19 TEST NON-CDC HGH THRU: HCPCS | Performed by: NURSE PRACTITIONER

## 2022-09-29 ENCOUNTER — LAB REQUISITION (OUTPATIENT)
Dept: LAB | Facility: HOSPITAL | Age: 42
End: 2022-09-29

## 2022-09-29 DIAGNOSIS — N76.0 ACUTE VAGINITIS: ICD-10-CM

## 2022-09-29 PROCEDURE — 87205 SMEAR GRAM STAIN: CPT | Performed by: OBSTETRICS & GYNECOLOGY

## 2022-09-29 PROCEDURE — 87070 CULTURE OTHR SPECIMN AEROBIC: CPT | Performed by: OBSTETRICS & GYNECOLOGY

## 2022-10-01 LAB
BACTERIA SPEC AEROBE CULT: NORMAL
GRAM STN SPEC: NORMAL

## 2022-11-28 RX ORDER — OMEPRAZOLE 20 MG/1
CAPSULE, DELAYED RELEASE ORAL
Qty: 90 CAPSULE | Refills: 0 | Status: SHIPPED | OUTPATIENT
Start: 2022-11-28 | End: 2023-02-23

## 2023-02-23 RX ORDER — OMEPRAZOLE 20 MG/1
CAPSULE, DELAYED RELEASE ORAL
Qty: 90 CAPSULE | Refills: 0 | Status: SHIPPED | OUTPATIENT
Start: 2023-02-23

## 2023-03-01 ENCOUNTER — OFFICE VISIT (OUTPATIENT)
Dept: CARDIOLOGY | Facility: CLINIC | Age: 43
End: 2023-03-01
Payer: MEDICAID

## 2023-03-01 VITALS
BODY MASS INDEX: 30.92 KG/M2 | HEART RATE: 73 BPM | HEIGHT: 67 IN | DIASTOLIC BLOOD PRESSURE: 66 MMHG | SYSTOLIC BLOOD PRESSURE: 124 MMHG | WEIGHT: 197 LBS

## 2023-03-01 DIAGNOSIS — I34.0 MILD MITRAL REGURGITATION: Primary | ICD-10-CM

## 2023-03-01 DIAGNOSIS — R00.2 PALPITATIONS: ICD-10-CM

## 2023-03-01 PROBLEM — K76.0 NONALCOHOLIC FATTY LIVER DISEASE: Status: ACTIVE | Noted: 2022-06-24

## 2023-03-01 PROBLEM — E27.0 ACTH ELEVATION (HCC): Status: ACTIVE | Noted: 2023-03-01

## 2023-03-01 PROBLEM — S90.511A ABRASION OF RIGHT ANKLE: Status: RESOLVED | Noted: 2021-07-02 | Resolved: 2023-03-01

## 2023-03-01 PROBLEM — R79.89 ACTH ELEVATION: Status: ACTIVE | Noted: 2023-03-01

## 2023-03-01 PROBLEM — G47.00 INSOMNIA: Status: ACTIVE | Noted: 2022-06-06

## 2023-03-01 PROBLEM — M25.571 RIGHT ANKLE PAIN: Status: RESOLVED | Noted: 2021-07-02 | Resolved: 2023-03-01

## 2023-03-01 PROBLEM — L30.9 ECZEMA: Status: ACTIVE | Noted: 2022-10-31

## 2023-03-01 PROBLEM — M79.7 FIBROMYALGIA: Status: ACTIVE | Noted: 2021-07-13

## 2023-03-01 PROBLEM — N83.202 CYST OF LEFT OVARY: Status: ACTIVE | Noted: 2022-06-24

## 2023-03-01 PROBLEM — R10.12 LEFT UPPER QUADRANT ABDOMINAL PAIN: Status: RESOLVED | Noted: 2022-08-10 | Resolved: 2023-03-01

## 2023-03-01 PROBLEM — E55.9 VITAMIN D DEFICIENCY: Status: ACTIVE | Noted: 2022-10-12

## 2023-03-01 PROBLEM — R12 HEARTBURN: Status: RESOLVED | Noted: 2022-08-10 | Resolved: 2023-03-01

## 2023-03-01 PROBLEM — R07.9 CHEST PAIN: Status: RESOLVED | Noted: 2021-12-07 | Resolved: 2023-03-01

## 2023-03-01 PROBLEM — E53.8 VITAMIN B12 DEFICIENCY: Status: ACTIVE | Noted: 2022-10-12

## 2023-03-01 PROBLEM — E24.0: Status: ACTIVE | Noted: 2022-04-01

## 2023-03-01 PROBLEM — G93.32 CHRONIC FATIGUE SYNDROME: Status: ACTIVE | Noted: 2023-03-01

## 2023-03-01 PROBLEM — F41.9 ANXIETY: Status: ACTIVE | Noted: 2021-04-06

## 2023-03-01 PROBLEM — E03.9 HYPOTHYROIDISM: Status: ACTIVE | Noted: 2022-10-18

## 2023-03-01 PROBLEM — D50.8 IRON DEFICIENCY ANEMIA SECONDARY TO INADEQUATE DIETARY IRON INTAKE: Status: ACTIVE | Noted: 2023-03-01

## 2023-03-01 PROBLEM — E27.49 DECREASED CORTISOL LEVEL (HCC): Status: ACTIVE | Noted: 2023-03-01

## 2023-03-01 PROBLEM — S93.401A SPRAIN OF RIGHT ANKLE: Status: RESOLVED | Noted: 2021-07-02 | Resolved: 2023-03-01

## 2023-03-01 PROBLEM — D35.2 PITUITARY MICROADENOMA: Status: ACTIVE | Noted: 2022-04-07

## 2023-03-01 PROCEDURE — 99213 OFFICE O/P EST LOW 20 MIN: CPT | Performed by: NURSE PRACTITIONER

## 2023-03-01 RX ORDER — DOXYCYCLINE 50 MG/1
50 CAPSULE ORAL DAILY
COMMUNITY
Start: 2022-12-14

## 2023-03-01 NOTE — PROGRESS NOTES
Chief Complaint  Follow-up    Subjective            History of Present Illness  Vanessa Ginag is a 42-year-old female patient who presents to the office today for follow-up.  She seen Dr. Burnett on 7/12/2022 with complaint of chest pain and palpitations. She had treadmill stress test on 12/23/21 which was negative for ischemia. She had echocardiogram that shows normal heart function with mild mitral regurgitation heart murmur. She denies any exertional chest pain. She denies shortness of breath or edema. She admits to random daily palpitations that are associated with lightheadedness.  She denies any caffeine intake.    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   • Hernia     Hiatal hernia   • Hyperlipidemia    • Iron deficiency anemia secondary to inadequate dietary iron intake 3/1/2023   • Lactose intolerance    • Liver disease     [place]   • Sleep apnea          ALLERGY  Allergies   Allergen Reactions   • Avocado Rash   • Benzalkonium Chloride Rash   • Formaldehyde Rash   • Gold Rash   • Latex Rash          SURGICALHX  Past Surgical History:   Procedure Laterality Date   • ABDOMINAL SURGERY      Endometrial ablation   • ANKLE SURGERY Right 2017   • ENDOSCOPY  2016    Esophageal ablation   • ENDOSCOPY N/A 8/31/2022    Procedure: ESOPHAGOGASTRODUODENOSCOPY WITH BX'S;  Surgeon: Ida Stevens MD;  Location: [facility];  Service: Gastroenterology;  Laterality: N/A;  pre: LUQ PAIN, HEARTBURN  post: IRREGULAR Z-LINE, ESOPHAGITIS   • TRANSPHENOIDAL / TRANSNASAL HYPOPHYSECTOMY / RESECTION PITUITARY TUMOR  04/2022   • UPPER GASTROINTESTINAL ENDOSCOPY            SOC  Social History     Socioeconomic History   •  Marital status:    Tobacco Use   • Smoking status: Former     Packs/day: 0.50     Years: 10.00     Pack years: 5.00     Types: Cigarettes     Quit date: 2020     Years since quittin.2   • Smokeless tobacco: Never   Vaping Use   • Vaping Use: Never used   Substance and Sexual Activity   • Alcohol use: Never   • Drug use: Never   • Sexual activity: Yes     Partners: Male     Birth control/protection: None         FAMHX  Family History   Problem Relation Age of Onset   • Alcohol abuse Mother    • Alcohol abuse Father    • Colon cancer Maternal Grandfather    • Malig Hyperthermia Neg Hx           MEDSIGONLY  Current Outpatient Medications on File Prior to Visit   Medication Sig   • ascorbic acid (VITAMIN C) 1000 MG tablet Take 1 tablet by mouth.   • Cholecalciferol (Vitamin D3) 125 MCG (5000 UT) tablet dispersible Take 5,000 Units by mouth Daily.   • coenzyme Q10 100 MG capsule 1 capsule 3 (Three) Times a Day.   • doxycycline (MONODOX) 50 MG capsule Take 1 capsule by mouth Daily.   • magnesium gluconate 250 MG tablet tablet   Oral, BID, 0 Refill(s)   • omeprazole (priLOSEC) 20 MG capsule TAKE ONE CAPSULE BY MOUTH DAILY   • Probiotic Product (PROBIOTIC PO) Take  by mouth Daily.   • QUERCETIN PO Take  by mouth Daily.   • thyroid (ARMOUR) 15 MG tablet [place] Thyroid 15 mg tablet   Take 1 tablet every day by oral route in the morning for 30 days.   • vitamin D (ERGOCALCIFEROL) 1.25 MG (37037 UT) capsule capsule Take 1 capsule by mouth Every 7 (Seven) Days.   • Zinc 30 MG tablet Take 1 tablet by mouth Daily.   • [DISCONTINUED] Chlorcyclizine-Pseudoephed (Stahist AD) 25-60 MG tablet Take 1 tablet by mouth 3 (Three) Times a Day As Needed (Congestion).   • [DISCONTINUED] DIGESTIVE ENZYMES PO Take  by mouth 3 (Three) Times a Day Before Meals.   • [DISCONTINUED] hydrocortisone (CORTEF) 20 MG tablet Take 1 tablet by mouth Daily. Taking 20mg in AM and 5mg in afternoon.     No current facility-administered  "medications on file prior to visit.         Objective   /66   Pulse 73   Ht 170.2 cm (67\")   Wt 89.4 kg (197 lb)   BMI 30.85 kg/m²       Physical Exam  HENT:      Head: Normocephalic.   Neck:      Vascular: No carotid bruit.   Cardiovascular:      Rate and Rhythm: Normal rate and regular rhythm.      Pulses: Normal pulses.      Heart sounds: Murmur heard.   Pulmonary:      Effort: Pulmonary effort is normal.      Breath sounds: Normal breath sounds.   Musculoskeletal:      Cervical back: Neck supple.      Right lower leg: No edema.      Left lower leg: No edema.   Skin:     General: Skin is dry.      Capillary Refill: Capillary refill takes less than 2 seconds.   Neurological:      Mental Status: She is alert and oriented to person, place, and time.   Psychiatric:         Behavior: Behavior normal.       Result Review :   The following data was reviewed by: JEFFERSON Rivera on 03/01/2023:  No results found for: PROBNP     No results found for: TSH   Lab Results   Component Value Date    FREET4 1.05 02/24/2023      No results found for: DDIMERQUANT  No results found for: MG   No results found for: DIGOXIN   No results found for: TROPONINT       Lab 02/24/23  0936   HEMOGLOBIN A1C 5.1      Results for orders placed in visit on 12/23/21    Adult Transthoracic Echo Complete W/ Cont if Necessary Per Protocol    Interpretation Summary  Mild left ventricular hypertrophy with normal left-ventricular systolic function.  Mild mitral regurgitation.  Trace tricuspid regurgitation.         Assessment and Plan    Diagnoses and all orders for this visit:    1. Mild mitral regurgitation (Primary)  Asymptomatic, continue to monitor with repeat echocardiogram every 1 to 2 years.      2. Palpitations  Persistent daily palpitations, obtain 48-hour Holter monitor to assess for arrhythmia.  -     Holter Monitor - 48 Hour; Future            Follow Up   Return in about 1 year (around 3/1/2024) for Follow up with Dr" Hortencia.    Patient was given instructions and counseling regarding her condition or for health maintenance advice. Please see specific information pulled into the AVS if appropriate.     Vanessa CHILDS Rahat  reports that she quit smoking about 2 years ago. Her smoking use included cigarettes. She has a 5.00 pack-year smoking history. She has never used smokeless tobacco.           Mari Wisdom, JEFFERSON  03/01/23  14:02 EST    Dictated Utilizing Dragon Dictation

## 2023-05-24 RX ORDER — OMEPRAZOLE 20 MG/1
CAPSULE, DELAYED RELEASE ORAL
Qty: 90 CAPSULE | Refills: 0 | Status: SHIPPED | OUTPATIENT
Start: 2023-05-24

## 2023-08-21 RX ORDER — OMEPRAZOLE 20 MG/1
CAPSULE, DELAYED RELEASE ORAL
Qty: 90 CAPSULE | Refills: 0 | Status: SHIPPED | OUTPATIENT
Start: 2023-08-21

## 2023-09-11 ENCOUNTER — OFFICE VISIT (OUTPATIENT)
Dept: GASTROENTEROLOGY | Facility: CLINIC | Age: 43
End: 2023-09-11
Payer: MEDICAID

## 2023-09-11 VITALS
SYSTOLIC BLOOD PRESSURE: 144 MMHG | HEART RATE: 60 BPM | OXYGEN SATURATION: 96 % | BODY MASS INDEX: 34.69 KG/M2 | DIASTOLIC BLOOD PRESSURE: 83 MMHG | TEMPERATURE: 97.2 F | HEIGHT: 67 IN | WEIGHT: 221 LBS

## 2023-09-11 DIAGNOSIS — R11.0 NAUSEA: ICD-10-CM

## 2023-09-11 DIAGNOSIS — Z12.11 ENCOUNTER FOR SCREENING FOR MALIGNANT NEOPLASM OF COLON: ICD-10-CM

## 2023-09-11 DIAGNOSIS — R10.12 LEFT UPPER QUADRANT ABDOMINAL PAIN: Primary | ICD-10-CM

## 2023-09-11 DIAGNOSIS — K59.04 CHRONIC IDIOPATHIC CONSTIPATION: ICD-10-CM

## 2023-09-11 DIAGNOSIS — K22.70 BARRETT'S ESOPHAGUS WITHOUT DYSPLASIA: ICD-10-CM

## 2023-09-11 PROCEDURE — 1159F MED LIST DOCD IN RCRD: CPT | Performed by: PHYSICIAN ASSISTANT

## 2023-09-11 PROCEDURE — 99213 OFFICE O/P EST LOW 20 MIN: CPT | Performed by: PHYSICIAN ASSISTANT

## 2023-09-11 PROCEDURE — 1160F RVW MEDS BY RX/DR IN RCRD: CPT | Performed by: PHYSICIAN ASSISTANT

## 2023-09-11 RX ORDER — OMEPRAZOLE 20 MG/1
20 CAPSULE, DELAYED RELEASE ORAL DAILY
Qty: 90 CAPSULE | Refills: 1 | Status: SHIPPED | OUTPATIENT
Start: 2023-09-11

## 2023-09-11 RX ORDER — IBUPROFEN 800 MG/1
800 TABLET ORAL
COMMUNITY

## 2023-09-11 RX ORDER — MAGNESIUM GLUCONATE 27 MG(500)
120 TABLET ORAL 2 TIMES DAILY
COMMUNITY

## 2023-09-11 RX ORDER — FOLIC ACID 1 MG/1
1 TABLET ORAL DAILY
COMMUNITY

## 2023-09-11 RX ORDER — GABAPENTIN 300 MG/1
300 CAPSULE ORAL 3 TIMES DAILY
COMMUNITY

## 2023-09-11 RX ORDER — THIAMINE HCL 100 MG
TABLET ORAL DAILY
COMMUNITY

## 2023-09-11 RX ORDER — TIZANIDINE 4 MG/1
4 TABLET ORAL NIGHTLY PRN
COMMUNITY

## 2023-09-27 ENCOUNTER — HOSPITAL ENCOUNTER (OUTPATIENT)
Dept: NUCLEAR MEDICINE | Facility: HOSPITAL | Age: 43
Discharge: HOME | End: 2023-09-27
Admitting: PHYSICIAN ASSISTANT
Payer: MEDICAID

## 2023-09-27 PROCEDURE — 78264 GASTRIC EMPTYING IMG STUDY: CPT

## 2023-09-27 PROCEDURE — 0 TECHNETIUM SULFUR COLLOID: Performed by: PHYSICIAN ASSISTANT

## 2023-09-27 PROCEDURE — A9541 TC99M SULFUR COLLOID: HCPCS | Performed by: PHYSICIAN ASSISTANT

## 2023-09-27 RX ORDER — OMEPRAZOLE 20 MG/1
20 TABLET, DELAYED RELEASE ORAL DAILY
Qty: 90 TABLET | Refills: 2 | Status: SHIPPED | OUTPATIENT
Start: 2023-09-27

## 2023-09-27 RX ADMIN — TECHNETIUM TC 99M SULFUR COLLOID 1 DOSE: KIT at 07:54

## 2023-10-06 ENCOUNTER — TRANSCRIBE ORDERS (OUTPATIENT)
Dept: ADMINISTRATIVE | Facility: HOSPITAL | Age: 43
End: 2023-10-06
Payer: MEDICAID

## 2023-10-06 DIAGNOSIS — Z12.31 SCREENING MAMMOGRAM FOR BREAST CANCER: Primary | ICD-10-CM

## 2023-10-17 ENCOUNTER — HOSPITAL ENCOUNTER (OUTPATIENT)
Dept: MAMMOGRAPHY | Facility: HOSPITAL | Age: 43
Discharge: HOME | End: 2023-10-17
Admitting: OBSTETRICS & GYNECOLOGY
Payer: MEDICAID

## 2023-10-17 DIAGNOSIS — Z12.31 SCREENING MAMMOGRAM FOR BREAST CANCER: ICD-10-CM

## 2023-10-17 PROCEDURE — 77063 BREAST TOMOSYNTHESIS BI: CPT

## 2023-10-17 PROCEDURE — 77067 SCR MAMMO BI INCL CAD: CPT

## 2023-10-24 ENCOUNTER — TRANSCRIBE ORDERS (OUTPATIENT)
Dept: ADMINISTRATIVE | Facility: HOSPITAL | Age: 43
End: 2023-10-24
Payer: MEDICAID

## 2023-10-24 DIAGNOSIS — R92.8 ABNORMAL MAMMOGRAM: Primary | ICD-10-CM

## 2023-10-30 ENCOUNTER — HOSPITAL ENCOUNTER (OUTPATIENT)
Dept: ULTRASOUND IMAGING | Facility: HOSPITAL | Age: 43
Discharge: HOME | End: 2023-10-30
Payer: MEDICAID

## 2023-10-30 ENCOUNTER — HOSPITAL ENCOUNTER (OUTPATIENT)
Dept: MAMMOGRAPHY | Facility: HOSPITAL | Age: 43
Discharge: HOME | End: 2023-10-30
Payer: MEDICAID

## 2023-10-30 DIAGNOSIS — R92.8 ABNORMAL MAMMOGRAM: ICD-10-CM

## 2023-10-30 PROCEDURE — 77065 DX MAMMO INCL CAD UNI: CPT

## 2023-10-30 PROCEDURE — G0279 TOMOSYNTHESIS, MAMMO: HCPCS

## 2023-11-01 ENCOUNTER — TELEPHONE (OUTPATIENT)
Dept: GASTROENTEROLOGY | Facility: CLINIC | Age: 43
End: 2023-11-01
Payer: MEDICAID

## 2023-11-01 NOTE — TELEPHONE ENCOUNTER
CALLER: Vanessa Giang    RELATIONSHIP TO PATIENT: Self    BEST CALL BACK NUMBER: [phone]    CALL REGARDING Has a Procedure Scheduled for Friday 11/3/23 with Dr. Stevens and would like to Reschedule due to  cancelling last minute    Transferred Call to Jt

## 2023-11-03 ENCOUNTER — OUTSIDE FACILITY SERVICE (OUTPATIENT)
Dept: GASTROENTEROLOGY | Facility: CLINIC | Age: 43
End: 2023-11-03
Payer: MEDICAID

## 2023-12-07 ENCOUNTER — TELEPHONE (OUTPATIENT)
Dept: GASTROENTEROLOGY | Facility: CLINIC | Age: 43
End: 2023-12-07
Payer: MEDICAID

## 2023-12-07 NOTE — TELEPHONE ENCOUNTER
----- Message from Vanessa Giang sent at 12/7/2023 12:38 PM EST -----  Regarding: Upcoming colonoscopy   Contact: [phone]  Good afternoon, I have an upcoming colonoscopy scheduled with  on Dec. 22. I need to reschedule for sometime after the first of the year. If someone could please call me to get that taken care of, it would be greatly appreciated. [phone] Thanks so much  Vanessa

## 2023-12-08 ENCOUNTER — TELEPHONE (OUTPATIENT)
Dept: GASTROENTEROLOGY | Facility: CLINIC | Age: 43
End: 2023-12-08
Payer: MEDICAID

## 2023-12-08 PROBLEM — Z12.11 ENCOUNTER FOR SCREENING FOR MALIGNANT NEOPLASM OF COLON: Status: ACTIVE | Noted: 2023-09-11

## 2024-01-25 RX ORDER — TIZANIDINE 4 MG/1
4 TABLET ORAL NIGHTLY PRN
COMMUNITY

## 2024-01-25 RX ORDER — CELECOXIB 100 MG/1
100 CAPSULE ORAL 2 TIMES DAILY PRN
COMMUNITY

## 2024-01-26 ENCOUNTER — ANESTHESIA EVENT (OUTPATIENT)
Dept: SURGERY | Facility: SURGERY CENTER | Age: 44
End: 2024-01-26
Payer: MEDICAID

## 2024-01-26 ENCOUNTER — HOSPITAL ENCOUNTER (OUTPATIENT)
Facility: SURGERY CENTER | Age: 44
Setting detail: HOSPITAL OUTPATIENT SURGERY
Discharge: HOME | End: 2024-01-26
Attending: INTERNAL MEDICINE | Admitting: INTERNAL MEDICINE
Payer: MEDICAID

## 2024-01-26 ENCOUNTER — ANESTHESIA (OUTPATIENT)
Dept: SURGERY | Facility: SURGERY CENTER | Age: 44
End: 2024-01-26
Payer: MEDICAID

## 2024-01-26 VITALS
TEMPERATURE: 97.8 F | WEIGHT: 223 LBS | HEART RATE: 59 BPM | SYSTOLIC BLOOD PRESSURE: 150 MMHG | RESPIRATION RATE: 16 BRPM | OXYGEN SATURATION: 98 % | BODY MASS INDEX: 35.84 KG/M2 | HEIGHT: 66 IN | DIASTOLIC BLOOD PRESSURE: 78 MMHG

## 2024-01-26 DIAGNOSIS — Z12.11 ENCOUNTER FOR SCREENING FOR MALIGNANT NEOPLASM OF COLON: ICD-10-CM

## 2024-01-26 PROCEDURE — S0260 H&P FOR SURGERY: HCPCS | Performed by: INTERNAL MEDICINE

## 2024-01-26 PROCEDURE — 88305 TISSUE EXAM BY PATHOLOGIST: CPT | Performed by: INTERNAL MEDICINE

## 2024-01-26 PROCEDURE — 25810000003 LACTATED RINGERS PER 1000 ML: Performed by: INTERNAL MEDICINE

## 2024-01-26 PROCEDURE — 45385 COLONOSCOPY W/LESION REMOVAL: CPT | Performed by: INTERNAL MEDICINE

## 2024-01-26 PROCEDURE — 25810000003 LACTATED RINGERS PER 1000 ML: Performed by: STUDENT IN AN ORGANIZED HEALTH CARE EDUCATION/TRAINING PROGRAM

## 2024-01-26 PROCEDURE — 25010000002 PROPOFOL 10 MG/ML EMULSION: Performed by: STUDENT IN AN ORGANIZED HEALTH CARE EDUCATION/TRAINING PROGRAM

## 2024-01-26 PROCEDURE — 25010000002 PROPOFOL 200 MG/20ML EMULSION: Performed by: STUDENT IN AN ORGANIZED HEALTH CARE EDUCATION/TRAINING PROGRAM

## 2024-01-26 RX ORDER — LIDOCAINE HYDROCHLORIDE 10 MG/ML
0.5 INJECTION, SOLUTION INFILTRATION; PERINEURAL ONCE AS NEEDED
Status: DISCONTINUED | OUTPATIENT
Start: 2024-01-26 | End: 2024-01-26 | Stop reason: HOSPADM

## 2024-01-26 RX ORDER — SODIUM CHLORIDE, SODIUM LACTATE, POTASSIUM CHLORIDE, CALCIUM CHLORIDE 600; 310; 30; 20 MG/100ML; MG/100ML; MG/100ML; MG/100ML
INJECTION, SOLUTION INTRAVENOUS CONTINUOUS PRN
Status: DISCONTINUED | OUTPATIENT
Start: 2024-01-26 | End: 2024-01-26 | Stop reason: SURG

## 2024-01-26 RX ORDER — PROPOFOL 10 MG/ML
INJECTION, EMULSION INTRAVENOUS AS NEEDED
Status: DISCONTINUED | OUTPATIENT
Start: 2024-01-26 | End: 2024-01-26 | Stop reason: SURG

## 2024-01-26 RX ORDER — MAGNESIUM HYDROXIDE 1200 MG/15ML
LIQUID ORAL AS NEEDED
Status: DISCONTINUED | OUTPATIENT
Start: 2024-01-26 | End: 2024-01-26 | Stop reason: HOSPADM

## 2024-01-26 RX ORDER — SODIUM CHLORIDE 0.9 % (FLUSH) 0.9 %
10 SYRINGE (ML) INJECTION AS NEEDED
Status: DISCONTINUED | OUTPATIENT
Start: 2024-01-26 | End: 2024-01-26 | Stop reason: HOSPADM

## 2024-01-26 RX ORDER — SODIUM CHLORIDE, SODIUM LACTATE, POTASSIUM CHLORIDE, CALCIUM CHLORIDE 600; 310; 30; 20 MG/100ML; MG/100ML; MG/100ML; MG/100ML
1000 INJECTION, SOLUTION INTRAVENOUS CONTINUOUS
Status: DISCONTINUED | OUTPATIENT
Start: 2024-01-26 | End: 2024-01-26 | Stop reason: HOSPADM

## 2024-01-26 RX ORDER — LIDOCAINE HYDROCHLORIDE 20 MG/ML
INJECTION, SOLUTION EPIDURAL; INFILTRATION; INTRACAUDAL; PERINEURAL AS NEEDED
Status: DISCONTINUED | OUTPATIENT
Start: 2024-01-26 | End: 2024-01-26 | Stop reason: SURG

## 2024-01-26 RX ADMIN — SODIUM CHLORIDE, POTASSIUM CHLORIDE, SODIUM LACTATE AND CALCIUM CHLORIDE 1000 ML: 600; 310; 30; 20 INJECTION, SOLUTION INTRAVENOUS at 08:40

## 2024-01-26 RX ADMIN — PROPOFOL 100 MG: 10 INJECTION, EMULSION INTRAVENOUS at 09:04

## 2024-01-26 RX ADMIN — PROPOFOL 140 MCG/KG/MIN: 10 INJECTION, EMULSION INTRAVENOUS at 09:04

## 2024-01-26 RX ADMIN — SODIUM CHLORIDE, SODIUM LACTATE, POTASSIUM CHLORIDE, AND CALCIUM CHLORIDE: .6; .31; .03; .02 INJECTION, SOLUTION INTRAVENOUS at 09:01

## 2024-01-26 RX ADMIN — LIDOCAINE HYDROCHLORIDE 30 MG: 20 INJECTION, SOLUTION EPIDURAL; INFILTRATION; INTRACAUDAL; PERINEURAL at 09:04

## 2024-01-26 NOTE — H&P
[facility]  Pre Procedure History & Physical    Chief Complaint:   screening    Subjective     HPI:   44 yo here today for colonoscopy.  Pt reports FH CRC (GP).  Patient denies GI symptoms currently.       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    Developed post surgery After tumor was removed from pituitary gland    Hernia     Hiatal hernia    Hyperlipidemia     Iron deficiency anemia secondary to inadequate dietary iron intake 03/01/2023    Lactose intolerance     Liver disease     Nafld    Sleep apnea     no longer present       Past Surgical History:  Past Surgical History:   Procedure Laterality Date    ABDOMINAL SURGERY      Endometrial ablation    ANKLE SURGERY Right 2017    ENDOSCOPY  2016    Esophageal ablation    ENDOSCOPY N/A 08/31/2022    Procedure: ESOPHAGOGASTRODUODENOSCOPY WITH BX'S;  Surgeon: Ida Stevens MD;  Location: [facility] ENDOSCOPY;  Service: Gastroenterology;  Laterality: N/A;  pre: LUQ PAIN, HEARTBURN  post: IRREGULAR Z-LINE, ESOPHAGITIS    HYSTERECTOMY      TRANSPHENOIDAL / TRANSNASAL HYPOPHYSECTOMY / RESECTION PITUITARY TUMOR  04/2022    UPPER GASTROINTESTINAL ENDOSCOPY         Family History:  Family History   Problem Relation Age of Onset    Alcohol abuse Mother     Alcohol abuse Father     Colon cancer Maternal Grandfather     Cancer Paternal Grandfather 70        colon cancer    Malig Hyperthermia Neg Hx        Social History:   reports that she quit smoking about 3 years ago. Her smoking use included cigarettes. She has a 5.00 pack-year smoking history. She has never used smokeless tobacco. She reports that she does not drink alcohol and does not use drugs.    Medications:   Medications Prior to Admission   Medication  "Sig Dispense Refill Last Dose    Black Elderberry 50 MG/5ML syrup Take  by mouth.   Past Week    celecoxib (CeleBREX) 100 MG capsule Take 1 capsule by mouth 2 (Two) Times a Day As Needed for Mild Pain.   Past Week    gabapentin (NEURONTIN) 300 MG capsule Take 1 capsule by mouth 3 (Three) Times a Day.   Past Week    Methylsulfonylmethane (MSM PO) Take  by mouth.   Past Week    NON FORMULARY Apply 1 dose topically to the appropriate area as directed Daily. Dermal- vit patch   Past Week    omeprazole OTC (PriLOSEC OTC) 20 MG EC tablet Take 1 tablet by mouth Daily. 90 tablet 2 Past Week    QUERCETIN PO Take  by mouth Daily.   Past Week    tiZANidine (ZANAFLEX) 4 MG tablet Take 1 tablet by mouth At Night As Needed for Muscle Spasms.   Past Week    Unable to find 1 each 1 (One) Time. liposomal Vit C   Past Week       Allergies:  Cocamidopropyl betaine, Linalool, Nickel, Adhesive tape, Avocado, Bee pollen, Benzalkonium chloride, [place] red [red dye], Formaldehyde, Gold, Latex, Lavender oil, Other, Quaternium-15, and Tea tree oil    ROS:    Pertinent items are noted in HPI     Objective     Blood pressure 135/77, pulse 77, temperature 97.1 °F (36.2 °C), temperature source Temporal, resp. rate 16, height 167.6 cm (66\"), weight 101 kg (223 lb), last menstrual period 09/11/2022, SpO2 99%.    Physical Exam   Constitutional: Pt is oriented to person, place, and time and well-developed, well-nourished, and in no distress.   Mouth/Throat: Oropharynx is clear and moist.   Neck: Normal range of motion.   Cardiovascular: Normal rate, regular rhythm    Pulmonary/Chest: Effort normal    Abdominal: Soft. Nontender  Skin: Skin is warm and dry.   Psychiatric: Mood, memory, affect and judgment normal.     Assessment & Plan     Diagnosis:  Screening for colon cancer    Anticipated Surgical Procedure:  colonoscopy    The risks, benefits, and alternatives of this procedure have been discussed with the patient or the responsible party- the " patient understands and agrees to proceed.

## 2024-01-26 NOTE — ANESTHESIA POSTPROCEDURE EVALUATION
Patient: Vanessa Giang    Procedure Summary       Date: 01/26/24 Room / Location: [facility]  / [facility]    Anesthesia Start: 0901 Anesthesia Stop: 0924    Procedure: COLONOSCOPY TO CECUM WITH POLYPECTOMY Diagnosis:       Encounter for screening for malignant neoplasm of colon      (Encounter for screening for malignant neoplasm of colon [Z12.11])    Surgeons: Ida Stevens MD Provider: Juan Pablo Garner MD    Anesthesia Type: MAC ASA Status: 3            Anesthesia Type: MAC    Vitals  Vitals Value Taken Time   /73 01/26/24 0927   Temp 36.6 °C (97.8 °F) 01/26/24 0924   Pulse 64 01/26/24 0927   Resp 16 01/26/24 0924   SpO2 96 % 01/26/24 0927   Vitals shown include unfiled device data.        Post Anesthesia Care and Evaluation    Patient location during evaluation: bedside  Patient participation: complete - patient participated  Level of consciousness: awake and alert  Pain management: adequate    Airway patency: patent  Anesthetic complications: No anesthetic complications  PONV Status: controlled  Cardiovascular status: blood pressure returned to baseline and acceptable  Respiratory status: acceptable  Hydration status: acceptable

## 2024-01-26 NOTE — ANESTHESIA PREPROCEDURE EVALUATION
Anesthesia Evaluation     Patient summary reviewed and Nursing notes reviewed   no history of anesthetic complications:   NPO Solid Status: > 8 hours  NPO Liquid Status: > 2 hours           Airway   Mallampati: II  TM distance: >3 FB  Neck ROM: full  Dental      Pulmonary    (+) ,sleep apnea  Cardiovascular       ROS comment: Echo and stress 2 years ago largely unremarkable    Neuro/Psych  GI/Hepatic/Renal/Endo    (+) obesity, GERD, liver disease fatty liver disease, thyroid problem hypothyroidism    Musculoskeletal     Abdominal   (+) obese   Substance History      OB/GYN          Other                          Anesthesia Plan    ASA 3     MAC     intravenous induction     Anesthetic plan, risks, benefits, and alternatives have been provided, discussed and informed consent has been obtained with: patient.        CODE STATUS:

## 2024-01-29 LAB
LAB AP CASE REPORT: NORMAL
LAB AP CLINICAL INFORMATION: NORMAL
PATH REPORT.FINAL DX SPEC: NORMAL
PATH REPORT.GROSS SPEC: NORMAL

## 2024-02-05 ENCOUNTER — OFFICE VISIT (OUTPATIENT)
Dept: GASTROENTEROLOGY | Facility: CLINIC | Age: 44
End: 2024-02-05
Payer: MEDICAID

## 2024-02-05 ENCOUNTER — TELEPHONE (OUTPATIENT)
Dept: GASTROENTEROLOGY | Facility: CLINIC | Age: 44
End: 2024-02-05
Payer: MEDICAID

## 2024-02-05 VITALS
HEIGHT: 67 IN | WEIGHT: 227 LBS | BODY MASS INDEX: 35.63 KG/M2 | DIASTOLIC BLOOD PRESSURE: 85 MMHG | HEART RATE: 58 BPM | TEMPERATURE: 96.1 F | SYSTOLIC BLOOD PRESSURE: 148 MMHG

## 2024-02-05 DIAGNOSIS — K22.70 BARRETT'S ESOPHAGUS WITHOUT DYSPLASIA: ICD-10-CM

## 2024-02-05 DIAGNOSIS — K59.04 CHRONIC IDIOPATHIC CONSTIPATION: Primary | ICD-10-CM

## 2024-02-05 DIAGNOSIS — R10.12 LEFT UPPER QUADRANT ABDOMINAL PAIN: ICD-10-CM

## 2024-02-05 DIAGNOSIS — R11.0 NAUSEA: ICD-10-CM

## 2024-02-05 PROCEDURE — 1160F RVW MEDS BY RX/DR IN RCRD: CPT | Performed by: PHYSICIAN ASSISTANT

## 2024-02-05 PROCEDURE — 99214 OFFICE O/P EST MOD 30 MIN: CPT | Performed by: PHYSICIAN ASSISTANT

## 2024-02-05 PROCEDURE — 1159F MED LIST DOCD IN RCRD: CPT | Performed by: PHYSICIAN ASSISTANT

## 2024-02-05 RX ORDER — DOXYCYCLINE 100 MG/1
TABLET ORAL
COMMUNITY
Start: 2024-01-16

## 2024-02-05 RX ORDER — CALCIUM CITRATE/VITAMIN D3 200MG-6.25
TABLET ORAL
COMMUNITY

## 2024-02-05 RX ORDER — CLOBETASOL PROPIONATE 0.5 MG/G
OINTMENT TOPICAL
COMMUNITY
Start: 2024-01-15

## 2024-02-05 RX ORDER — MAGNESIUM GLUCONATE 30 MG(550)
TABLET ORAL
COMMUNITY

## 2024-02-05 RX ORDER — TIZANIDINE HYDROCHLORIDE 4 MG/1
CAPSULE, GELATIN COATED ORAL
COMMUNITY

## 2024-02-05 RX ORDER — MELOXICAM 15 MG/1
TABLET ORAL
COMMUNITY
Start: 2023-10-10

## 2024-02-05 RX ORDER — IVERMECTIN 10 MG/G
CREAM TOPICAL
COMMUNITY

## 2024-02-05 NOTE — TELEPHONE ENCOUNTER
It is noted that this pt has seen their message from Dr Stevens      Hm and cs recall placed for 1/26/29

## 2024-02-05 NOTE — PROGRESS NOTES
Chief Complaint  Abdominal Pain, Heartburn, Nausea, and Constipation    Subjective          History of Present Illness    Vanessa Giang is a  43 y.o. female presents for follow-up on chronic LUQ pain, chronic nausea, and constipation.  She has history of Vasquez's esophagus.  She is a patient of Dr. Stevens last seen for colonoscopy on 1/26/2024.    At last visit she was reporting chronic left upper quadrant pain radiating to the epigastrium and down the left side, intermittent, no trigger.  Chronic nausea but without vomiting.  No change with gluten-free or vegan diet.  At last visit she was started on omeprazole 20 mg daily. No change in pain with this. Admits to history of muscle spasms and chronic back pain. Doesn't remember having any pain for a couple days following colonoscopy prep.    She has chronic constipation, takes MiraLAX daily which helps some.  Associated with lower abdominal cramping and gas pains that are improved with a bowel movement.  Tries to eat high-fiber.  Eggs cause diarrhea.  Colace and senna caused nausea and did not help. Still with constipation, thin Bms, doesn't feel like she is emptying completely.  Laxatives cause more pain and nausea.     1/26/2024 colonoscopy showed adenomatous colon polyp.  Recall 5 years.  She has a family history of colorectal cancer in a grandparent.    9/27/2023 GES was normal.    Reviewed records under media from 2014: She has history of esophageal manometry on 4/30/2010 with Dr. Atkins that showed a high percent esophageal peristalsis, 20 mmHg LES pressure, incomplete relaxation.  Records from  of [facility] on 7/22/2015 found in media under 8/11/2022 date: Reports history of GERD and inlet patch s/post BARRX ablation November 2014.    From 9/11/2023 office note:  8/31/2022 EGD LA Grade A esophagitis, irregular Z-line, otherwise unremarkable.  Pathology showed normal duodenal biopsies.  Chronic focally active gastritis, negative H. pylori.  Mild reflux esophagitis.      "  6/16/2022 noncontrasted CT scan for abdominal pain, diarrhea showed uterine mass, fatty liver, mild hepatomegaly, left ovarian cyst.  No acute abnormalities or explanations for symptoms.    She has h/o Cushing's disease and pituitary tumor removed 4/22.      Objective   Vital Signs:   /85   Pulse 58   Temp 96.1 °F (35.6 °C)   Ht 170.2 cm (67\")   Wt 103 kg (227 lb)   BMI 35.55 kg/m²       Physical Exam  Vitals reviewed.   Constitutional:       General: She is awake. She is not in acute distress.     Appearance: Normal appearance. She is well-developed and well-groomed.   HENT:      Head: Normocephalic.   Pulmonary:      Effort: Pulmonary effort is normal. No respiratory distress.   Skin:     Coloration: Skin is not pale.   Neurological:      Mental Status: She is alert and oriented to person, place, and time.      Gait: Gait is intact.   Psychiatric:         Mood and Affect: Mood and affect normal.         Speech: Speech normal.         Behavior: Behavior is cooperative.         Judgment: Judgment normal.          Result Review :             Assessment and Plan    Diagnoses and all orders for this visit:    1. Chronic idiopathic constipation (Primary)    2. Left upper quadrant abdominal pain    3. Nausea    4. Vasquez's esophagus without dysplasia    Suspect IBS-C. Uncertain if this is source of pain or not. Trial trulance 3mg Daily to improve constipation.  Samples given. Consider trial of antispasmodic for pain--hesitation as this may cause worsening constipation.    Keep track of pain and events surrounding this. Work with PCP on non-GI causes.     Continue PPI for now--no Vasquez's esophagus on recent Egds, had BARRX ablation 2014 but notes from 2015 do not indicate the type of cells she had on pathology.  I do not have 2014 records.  [facility]     Follow Up   Return in about 6 weeks (around 3/18/2024).    Dragon dictation used throughout this note. "     Jud Jang PA-C

## 2024-02-05 NOTE — TELEPHONE ENCOUNTER
----- Message from Ida Stevens MD sent at 2/3/2024  8:25 AM EST -----  The polyp(s) biopsies showed adenomatous change. This is not cancerous but is considered potentially precancerous. Follow-up colonoscopy in 5 years is advised.

## 2024-02-28 NOTE — PROGRESS NOTES
[facility]  Cardiology progress Note    Patient Name: Vanessa Giang  : 1980    CHIEF COMPLAINT  Atypical chest pain        Subjective   Subjective     HISTORY OF PRESENT ILLNESS    Vanessa Giang is a 43 y.o. female with history of atypical chest pain.  No further chest pain.    REVIEW OF SYSTEMS    Constitutional:    No fever, no weight loss  Skin:     No rash  Otolaryngeal:    No difficulty swallowing  Cardiovascular: See HPI.  Pulmonary:    No cough, no sputum production    Personal History     Social History:    reports that she quit smoking about 3 years ago. Her smoking use included cigarettes. She has a 5.00 pack-year smoking history. She has never used smokeless tobacco. She reports current alcohol use of about 2.0 standard drinks of alcohol per week. She reports that she does not use drugs.    Home Medications:  Current Outpatient Medications on File Prior to Visit   Medication Sig    Black Elderberry 50 MG/5ML syrup Take  by mouth.    clobetasol (TEMOVATE) 0.05 % ointment     diclofenac (VOLTAREN) 75 MG EC tablet Take 1 tablet by mouth 2 (Two) Times a Day.    doxycycline (ADOXA) 100 MG tablet Take 1 tablet by mouth As Needed.    gabapentin (NEURONTIN) 300 MG capsule Take 1 capsule by mouth 3 (Three) Times a Day.    Ivermectin 1 % cream     Methylsulfonylmethane (MSM PO) Take  by mouth.    NON FORMULARY Apply 1 dose topically to the appropriate area as directed Daily. Dermal- vit patch    omeprazole OTC (PriLOSEC OTC) 20 MG EC tablet Take 1 tablet by mouth Daily.    QUERCETIN PO Take  by mouth Daily.    tiZANidine (ZANAFLEX) 4 MG tablet Take 1 tablet by mouth At Night As Needed for Muscle Spasms.    Unable to find 1 each 1 (One) Time. liposomal Vit C    [DISCONTINUED] celecoxib (CeleBREX) 100 MG capsule Take 1 capsule by mouth 2 (Two) Times a Day As Needed for Mild Pain. (Patient not taking: Reported on 3/1/2024)    [DISCONTINUED] Magnesium Gluconate 550 MG tablet Take 1 tablet every  day by oral route.    [DISCONTINUED] meloxicam (MOBIC) 15 MG tablet Take 1 tablet every day by oral route.    [DISCONTINUED] Multiple Minerals-Vitamins (Citracal Maximum Plus) tablet     [DISCONTINUED] multivitamin with minerals (HAIR SKIN & NAILS ADVANCED PO)     [DISCONTINUED] Omeprazole Magnesium (PRILOSEC PO) 20mg daily    [DISCONTINUED] TiZANidine (ZANAFLEX) 4 MG capsule Take 1 capsule every 6 hours by oral route.     No current facility-administered medications on file prior to visit.       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    Developed post surgery After tumor was removed from pituitary gland    Heart murmur     Hernia     Hiatal hernia    Hyperlipidemia     Iron deficiency anemia secondary to inadequate dietary iron intake 03/01/2023    Lactose intolerance     Liver disease     Nafld    Sleep apnea     no longer present       Allergies:  Allergies   Allergen Reactions    Cocamidopropyl Betaine Rash    Linalool Rash    Nickel Rash    Fd&C Blue #2 (Indigotine) Hives    Adhesive Tape Rash    Avocado Rash    Bee Pollen Rash    Benzalkonium Chloride Rash    [place] Red [Red Dye] Rash    Formaldehyde Rash    Gold Rash    Latex Rash    Lavender Oil Hives    Other Rash     All fragrance    Propolis Hives    Quaternium-15 Rash    Tea Tree Oil Rash       Objective    Objective       Vitals:   Heart Rate:  [58] 58  BP: (130)/(66) 130/66  Body mass index is 35.4 kg/m².     PHYSICAL EXAM:    General Appearance:   well developed  well nourished  HENT:   oropharynx moist  lips not cyanotic  Neck:  thyroid not enlarged  supple  Respiratory:  no respiratory distress  normal breath sounds  no rales  Cardiovascular:  no jugular venous distention  regular rhythm  apical impulse normal  S1 normal, S2 normal  no  S3, no S4   no murmur  no rub, no thrill  carotid pulses normal; no bruit  pedal pulses normal  lower extremity edema: none    Skin:   warm, dry  Psychiatric:  judgement and insight appropriate  normal mood and affect        Result Review:  I have personally reviewed the available results from  [x]  Laboratory  [x]  EKG  [x]  Cardiology  [x]  Medications  [x]  Old records  []  Other:     Procedures    Results for orders placed in visit on 12/23/21    Adult Transthoracic Echo Complete W/ Cont if Necessary Per Protocol    Interpretation Summary  Mild left ventricular hypertrophy with normal left-ventricular systolic function.  Mild mitral regurgitation.  Trace tricuspid regurgitation.     Impression/Plan:  1.  Precordial atypical chest pain: Negative treadmill stress test.  2.  Stable palpitations: Low-dose beta-blockers if she has increased palpitations.  3.  Cushing's disease: Continue thyroid replacement           Bc Burnett MD   03/01/24   11:34 EST

## 2024-03-01 ENCOUNTER — OFFICE VISIT (OUTPATIENT)
Dept: CARDIOLOGY | Facility: CLINIC | Age: 44
End: 2024-03-01
Payer: MEDICAID

## 2024-03-01 VITALS
HEART RATE: 58 BPM | WEIGHT: 226 LBS | HEIGHT: 67 IN | DIASTOLIC BLOOD PRESSURE: 66 MMHG | SYSTOLIC BLOOD PRESSURE: 130 MMHG | BODY MASS INDEX: 35.47 KG/M2

## 2024-03-01 DIAGNOSIS — R07.9 CHEST PAIN, UNSPECIFIED TYPE: ICD-10-CM

## 2024-03-01 DIAGNOSIS — R00.2 PALPITATIONS: Primary | ICD-10-CM

## 2024-03-01 PROCEDURE — 99213 OFFICE O/P EST LOW 20 MIN: CPT | Performed by: SPECIALIST

## 2024-03-01 RX ORDER — DICLOFENAC SODIUM 75 MG/1
75 TABLET, DELAYED RELEASE ORAL 2 TIMES DAILY
COMMUNITY
Start: 2024-02-28

## 2024-03-18 ENCOUNTER — OFFICE VISIT (OUTPATIENT)
Dept: GASTROENTEROLOGY | Facility: CLINIC | Age: 44
End: 2024-03-18
Payer: MEDICAID

## 2024-03-18 VITALS
HEIGHT: 67 IN | WEIGHT: 227 LBS | HEART RATE: 65 BPM | SYSTOLIC BLOOD PRESSURE: 132 MMHG | TEMPERATURE: 98.6 F | BODY MASS INDEX: 35.63 KG/M2 | OXYGEN SATURATION: 95 % | DIASTOLIC BLOOD PRESSURE: 84 MMHG

## 2024-03-18 DIAGNOSIS — R10.12 LEFT UPPER QUADRANT ABDOMINAL PAIN: ICD-10-CM

## 2024-03-18 DIAGNOSIS — K59.04 CHRONIC IDIOPATHIC CONSTIPATION: Primary | ICD-10-CM

## 2024-03-18 DIAGNOSIS — R11.0 NAUSEA: ICD-10-CM

## 2024-03-18 DIAGNOSIS — K22.70 BARRETT'S ESOPHAGUS WITHOUT DYSPLASIA: ICD-10-CM

## 2024-03-18 PROCEDURE — 1159F MED LIST DOCD IN RCRD: CPT | Performed by: PHYSICIAN ASSISTANT

## 2024-03-18 PROCEDURE — 1160F RVW MEDS BY RX/DR IN RCRD: CPT | Performed by: PHYSICIAN ASSISTANT

## 2024-03-18 PROCEDURE — 99214 OFFICE O/P EST MOD 30 MIN: CPT | Performed by: PHYSICIAN ASSISTANT

## 2024-03-18 NOTE — PROGRESS NOTES
Chief Complaint  Chronic idiopathic constipation    Subjective          History of Present Illness    Vanessa Giang is a  43 y.o. female presents for follow-up on chronic LUQ pain, chronic nausea, and constipation.  She has history of Vasquez's esophagus.  She is a patient of Dr. Stevens last seen by me on 2/5/2024.    At the last visit she was to trial Trulance 3 mg daily for her constipation which caused diarrhea, headaches, and nausea.  Also to see if this improves her chronic left upper quadrant pain although not clear this is GI related given characteristics. Pain is some better with more Bms, but still present.     She is on omeprazole 20 mg daily for chronic nausea and GERD with esophagitis.  No change in left upper quadrant pain with this.  No Vasquez's esophagus on recent Egds, had BARRX ablation 2014 but notes from 2015 do not indicate the type of cells she had on pathology.  I do not have any more detail on 2014 records.  Performed at [facility] with Dr. Hopkins.      From 2/5/2024 office visit:  1/26/2024 colonoscopy showed adenomatous colon polyp.  Recall 5 years.  She has a family history of colorectal cancer in a grandparent.     9/27/2023 GES was normal.     Reviewed records under media from 2014: She has history of esophageal manometry on 4/30/2010 with Dr. Atkins that showed a high percent esophageal peristalsis, 20 mmHg LES pressure, incomplete relaxation.  Records from [facility] on 7/22/2015 found in media under 8/11/2022 date: Reports history of GERD and inlet patch s/post BARRX ablation November 2014.     From 9/11/2023 office note:  8/31/2022 EGD LA Grade A esophagitis, irregular Z-line, otherwise unremarkable.  Pathology showed normal duodenal biopsies.  Chronic focally active gastritis, negative H. pylori.  Mild reflux esophagitis.       6/16/2022 noncontrasted CT scan for abdominal pain, diarrhea showed uterine mass, fatty liver, mild hepatomegaly, left ovarian cyst.  No acute  "abnormalities or explanations for symptoms.     She has h/o Cushing's disease and pituitary tumor removed 4/22.       Has history of cigarette smoking.    Objective   Vital Signs:   /84   Pulse 65   Temp 98.6 °F (37 °C)   Ht 170.2 cm (67\")   Wt 103 kg (227 lb)   SpO2 95%   BMI 35.55 kg/m²       Physical Exam  Vitals reviewed.   Constitutional:       General: She is awake. She is not in acute distress.     Appearance: Normal appearance. She is well-developed and well-groomed.   HENT:      Head: Normocephalic.   Pulmonary:      Effort: Pulmonary effort is normal. No respiratory distress.   Skin:     Coloration: Skin is not pale.   Neurological:      Mental Status: She is alert and oriented to person, place, and time.      Gait: Gait is intact.   Psychiatric:         Mood and Affect: Mood and affect normal.         Speech: Speech normal.         Behavior: Behavior is cooperative.         Judgment: Judgment normal.          Result Review :             Assessment and Plan    Diagnoses and all orders for this visit:    1. Chronic idiopathic constipation (Primary)    2. Left upper quadrant abdominal pain    3. Nausea    4. Vasquez's esophagus without dysplasia    Agreeable to try miralax in titrating dose. If does not work, will start samples of Linzess 72mcg daily. Can't use Amitiza due to red dye allergy.     Still consider antispasmodic for left upper quadrant pain although it is some better with approved stool with Trulance which she is taking as needed at this point.    Continue omeprazole 20 mg daily for history of Vasquez's esophagus and GERD.    Follow Up   Return in about 6 weeks (around 4/29/2024).    Dragon dictation used throughout this note.     Jud Jang PA-C   "

## 2024-04-30 ENCOUNTER — OFFICE VISIT (OUTPATIENT)
Dept: GASTROENTEROLOGY | Facility: CLINIC | Age: 44
End: 2024-04-30
Payer: MEDICAID

## 2024-04-30 VITALS
HEART RATE: 60 BPM | DIASTOLIC BLOOD PRESSURE: 80 MMHG | HEIGHT: 67 IN | WEIGHT: 219.2 LBS | BODY MASS INDEX: 34.4 KG/M2 | TEMPERATURE: 96.8 F | SYSTOLIC BLOOD PRESSURE: 130 MMHG

## 2024-04-30 DIAGNOSIS — K22.70 BARRETT'S ESOPHAGUS WITHOUT DYSPLASIA: ICD-10-CM

## 2024-04-30 DIAGNOSIS — R10.12 LEFT UPPER QUADRANT ABDOMINAL PAIN: ICD-10-CM

## 2024-04-30 DIAGNOSIS — K59.04 CHRONIC IDIOPATHIC CONSTIPATION: Primary | ICD-10-CM

## 2024-04-30 PROCEDURE — 1159F MED LIST DOCD IN RCRD: CPT | Performed by: PHYSICIAN ASSISTANT

## 2024-04-30 PROCEDURE — 99215 OFFICE O/P EST HI 40 MIN: CPT | Performed by: PHYSICIAN ASSISTANT

## 2024-04-30 PROCEDURE — 1160F RVW MEDS BY RX/DR IN RCRD: CPT | Performed by: PHYSICIAN ASSISTANT

## 2024-04-30 RX ORDER — RIBOFLAVIN (VITAMIN B2) 100 MG
TABLET ORAL
COMMUNITY

## 2024-04-30 RX ORDER — FLUOCINONIDE TOPICAL SOLUTION USP, 0.05% 0.5 MG/ML
SOLUTION TOPICAL
COMMUNITY
Start: 2024-04-16

## 2024-08-01 ENCOUNTER — TRANSCRIBE ORDERS (OUTPATIENT)
Dept: ADMINISTRATIVE | Facility: HOSPITAL | Age: 44
End: 2024-08-01
Payer: MEDICAID

## 2024-08-01 DIAGNOSIS — Z12.31 ENCOUNTER FOR SCREENING MAMMOGRAM FOR BREAST CANCER: Primary | ICD-10-CM

## 2024-10-18 ENCOUNTER — HOSPITAL ENCOUNTER (OUTPATIENT)
Dept: MAMMOGRAPHY | Facility: HOSPITAL | Age: 44
Discharge: HOME | End: 2024-10-18
Admitting: OBSTETRICS & GYNECOLOGY
Payer: MEDICAID

## 2024-10-18 DIAGNOSIS — Z12.31 ENCOUNTER FOR SCREENING MAMMOGRAM FOR BREAST CANCER: ICD-10-CM

## 2024-10-18 PROCEDURE — 77067 SCR MAMMO BI INCL CAD: CPT

## 2024-10-18 PROCEDURE — 77063 BREAST TOMOSYNTHESIS BI: CPT

## 2024-11-25 ENCOUNTER — APPOINTMENT (OUTPATIENT)
Dept: CT IMAGING | Facility: HOSPITAL | Age: 44
End: 2024-11-25
Payer: MEDICAID

## 2024-11-25 ENCOUNTER — HOSPITAL ENCOUNTER (EMERGENCY)
Facility: HOSPITAL | Age: 44
Discharge: HOME | End: 2024-11-25
Attending: EMERGENCY MEDICINE | Admitting: EMERGENCY MEDICINE
Payer: MEDICAID

## 2024-11-25 VITALS
HEIGHT: 67 IN | OXYGEN SATURATION: 100 % | HEART RATE: 89 BPM | BODY MASS INDEX: 33.88 KG/M2 | TEMPERATURE: 98 F | DIASTOLIC BLOOD PRESSURE: 62 MMHG | SYSTOLIC BLOOD PRESSURE: 145 MMHG | RESPIRATION RATE: 18 BRPM | WEIGHT: 215.83 LBS

## 2024-11-25 DIAGNOSIS — R10.9 ABDOMINAL PAIN, UNSPECIFIED ABDOMINAL LOCATION: Primary | ICD-10-CM

## 2024-11-25 DIAGNOSIS — K76.89 LIVER CYST: ICD-10-CM

## 2024-11-25 LAB
ALBUMIN SERPL-MCNC: 4.7 G/DL (ref 3.5–5.2)
ALBUMIN/GLOB SERPL: 1.6 G/DL
ALP SERPL-CCNC: 57 U/L (ref 39–117)
ALT SERPL W P-5'-P-CCNC: 11 U/L (ref 1–33)
ANION GAP SERPL CALCULATED.3IONS-SCNC: 10.3 MMOL/L (ref 5–15)
AST SERPL-CCNC: 15 U/L (ref 1–32)
BASOPHILS # BLD AUTO: 0.04 10*3/MM3 (ref 0–0.2)
BASOPHILS NFR BLD AUTO: 0.5 % (ref 0–1.5)
BILIRUB SERPL-MCNC: 0.5 MG/DL (ref 0–1.2)
BILIRUB UR QL STRIP: NEGATIVE
BUN SERPL-MCNC: 5 MG/DL (ref 6–20)
BUN/CREAT SERPL: 6.8 (ref 7–25)
CALCIUM SPEC-SCNC: 9.4 MG/DL (ref 8.6–10.5)
CHLORIDE SERPL-SCNC: 102 MMOL/L (ref 98–107)
CLARITY UR: CLEAR
CO2 SERPL-SCNC: 25.7 MMOL/L (ref 22–29)
COLOR UR: YELLOW
CREAT SERPL-MCNC: 0.74 MG/DL (ref 0.57–1)
DEPRECATED RDW RBC AUTO: 40.4 FL (ref 37–54)
EGFRCR SERPLBLD CKD-EPI 2021: 101.8 ML/MIN/1.73
EOSINOPHIL # BLD AUTO: 0.17 10*3/MM3 (ref 0–0.4)
EOSINOPHIL NFR BLD AUTO: 2.2 % (ref 0.3–6.2)
ERYTHROCYTE [DISTWIDTH] IN BLOOD BY AUTOMATED COUNT: 12 % (ref 12.3–15.4)
GLOBULIN UR ELPH-MCNC: 3 GM/DL
GLUCOSE SERPL-MCNC: 96 MG/DL (ref 65–99)
GLUCOSE UR STRIP-MCNC: NEGATIVE MG/DL
HCT VFR BLD AUTO: 43.1 % (ref 34–46.6)
HGB BLD-MCNC: 14.4 G/DL (ref 12–15.9)
HGB UR QL STRIP.AUTO: NEGATIVE
HOLD SPECIMEN: NORMAL
HOLD SPECIMEN: NORMAL
IMM GRANULOCYTES # BLD AUTO: 0.02 10*3/MM3 (ref 0–0.05)
IMM GRANULOCYTES NFR BLD AUTO: 0.3 % (ref 0–0.5)
KETONES UR QL STRIP: ABNORMAL
LEUKOCYTE ESTERASE UR QL STRIP.AUTO: NEGATIVE
LIPASE SERPL-CCNC: 18 U/L (ref 13–60)
LYMPHOCYTES # BLD AUTO: 1.56 10*3/MM3 (ref 0.7–3.1)
LYMPHOCYTES NFR BLD AUTO: 20.1 % (ref 19.6–45.3)
MCH RBC QN AUTO: 30.7 PG (ref 26.6–33)
MCHC RBC AUTO-ENTMCNC: 33.4 G/DL (ref 31.5–35.7)
MCV RBC AUTO: 91.9 FL (ref 79–97)
MONOCYTES # BLD AUTO: 0.34 10*3/MM3 (ref 0.1–0.9)
MONOCYTES NFR BLD AUTO: 4.4 % (ref 5–12)
NEUTROPHILS NFR BLD AUTO: 5.62 10*3/MM3 (ref 1.7–7)
NEUTROPHILS NFR BLD AUTO: 72.5 % (ref 42.7–76)
NITRITE UR QL STRIP: NEGATIVE
NRBC BLD AUTO-RTO: 0 /100 WBC (ref 0–0.2)
PH UR STRIP.AUTO: 6.5 [PH] (ref 5–8)
PLATELET # BLD AUTO: 322 10*3/MM3 (ref 140–450)
PMV BLD AUTO: 10.7 FL (ref 6–12)
POTASSIUM SERPL-SCNC: 3.7 MMOL/L (ref 3.5–5.2)
PROT SERPL-MCNC: 7.7 G/DL (ref 6–8.5)
PROT UR QL STRIP: NEGATIVE
RBC # BLD AUTO: 4.69 10*6/MM3 (ref 3.77–5.28)
SODIUM SERPL-SCNC: 138 MMOL/L (ref 136–145)
SP GR UR STRIP: <=1.005 (ref 1–1.03)
UROBILINOGEN UR QL STRIP: ABNORMAL
WBC NRBC COR # BLD AUTO: 7.75 10*3/MM3 (ref 3.4–10.8)
WHOLE BLOOD HOLD COAG: NORMAL
WHOLE BLOOD HOLD SPECIMEN: NORMAL

## 2024-11-25 PROCEDURE — 36415 COLL VENOUS BLD VENIPUNCTURE: CPT

## 2024-11-25 PROCEDURE — 74177 CT ABD & PELVIS W/CONTRAST: CPT

## 2024-11-25 PROCEDURE — 83690 ASSAY OF LIPASE: CPT

## 2024-11-25 PROCEDURE — 81003 URINALYSIS AUTO W/O SCOPE: CPT | Performed by: EMERGENCY MEDICINE

## 2024-11-25 PROCEDURE — 85025 COMPLETE CBC W/AUTO DIFF WBC: CPT

## 2024-11-25 PROCEDURE — 99285 EMERGENCY DEPT VISIT HI MDM: CPT

## 2024-11-25 PROCEDURE — 25510000001 IOPAMIDOL PER 1 ML: Performed by: EMERGENCY MEDICINE

## 2024-11-25 PROCEDURE — 80053 COMPREHEN METABOLIC PANEL: CPT

## 2024-11-25 PROCEDURE — 25810000003 SODIUM CHLORIDE 0.9 % SOLUTION

## 2024-11-25 RX ORDER — IOPAMIDOL 755 MG/ML
100 INJECTION, SOLUTION INTRAVASCULAR
Status: COMPLETED | OUTPATIENT
Start: 2024-11-25 | End: 2024-11-25

## 2024-11-25 RX ORDER — DICYCLOMINE HCL 20 MG
20 TABLET ORAL EVERY 8 HOURS PRN
Qty: 15 TABLET | Refills: 0 | Status: SHIPPED | OUTPATIENT
Start: 2024-11-25 | End: 2024-11-30

## 2024-11-25 RX ORDER — SODIUM CHLORIDE 0.9 % (FLUSH) 0.9 %
10 SYRINGE (ML) INJECTION AS NEEDED
Status: DISCONTINUED | OUTPATIENT
Start: 2024-11-25 | End: 2024-11-25 | Stop reason: HOSPADM

## 2024-11-25 RX ORDER — CHLORAL HYDRATE 500 MG
CAPSULE ORAL
COMMUNITY

## 2024-11-25 RX ADMIN — IOPAMIDOL 90 ML: 755 INJECTION, SOLUTION INTRAVENOUS at 16:09

## 2024-11-25 RX ADMIN — SODIUM CHLORIDE 500 ML: 9 INJECTION, SOLUTION INTRAVENOUS at 15:28

## 2024-11-25 NOTE — ED PROVIDER NOTES
Time: 4:53 PM EST  Date of encounter:  11/25/2024  Independent Historian/Clinical History and Information was obtained by:   Patient    History is limited by: N/A    Chief Complaint: abdominal pain       History of Present Illness:  Patient is a 43 y.o. year old female who presents to the emergency department for evaluation of left upper quadrant abdominal pain with associated alternating diarrhea and constipation that began 1 month ago.  Patient denies urinary symptoms.  Patient denies fever.  Patient denies chest pain and shortness of breath.      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    Cushing's disease     Disease of thyroid gland     Eczema     Fatty liver     Fibromyalgia     GERD (gastroesophageal reflux disease)     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    Developed post surgery After tumor was removed from pituitary gland    Heart murmur     Hernia     Hiatal hernia    Hyperlipidemia     Iron deficiency anemia secondary to inadequate dietary iron intake 03/01/2023    Lactose intolerance     Liver disease     [place]    Sleep apnea     no longer present     Past Surgical History:   Procedure Laterality Date    ABDOMINAL SURGERY      Endometrial ablation    ANKLE SURGERY Right 2017    COLONOSCOPY N/A 01/26/2024    Procedure: COLONOSCOPY TO CECUM WITH POLYPECTOMY;  Surgeon: Ida Stevens MD;  [facility];  Service: Gastroenterology;   Laterality: N/A;  Hemorrhoids, polyp    ENDOSCOPY  2016    Esophageal ablation    ENDOSCOPY N/A 08/31/2022    Procedure: ESOPHAGOGASTRODUODENOSCOPY WITH BX'S;  Surgeon: Ida Stevens MD;  Location: [facility] ENDOSCOPY;  Service: Gastroenterology;  Laterality: N/A;  pre: LUQ PAIN, HEARTBURN  post: IRREGULAR Z-LINE, ESOPHAGITIS    HYSTERECTOMY      TRANSPHENOIDAL / TRANSNASAL HYPOPHYSECTOMY / RESECTION PITUITARY TUMOR  04/2022    UPPER GASTROINTESTINAL ENDOSCOPY       Family History   Problem Relation Age of Onset    Alcohol abuse Mother     Hyperlipidemia Mother     Hypertension Mother     Alcohol abuse Father     Colon cancer Maternal Grandfather     Arrhythmia Maternal Grandfather     Hyperlipidemia Maternal Grandfather     Cancer Paternal Grandfather 70        colon cancer    Heart disease Maternal Grandmother     Malig Hyperthermia Neg Hx        Home Medications:  Prior to Admission medications    Medication Sig Start Date End Date Taking? Authorizing Provider   ascorbic acid (VITAMIN C) 100 MG tablet     Jairon Logan MD   Black Elderberry 50 MG/5ML syrup Take  by mouth.    Jairon Logan MD   clobetasol (TEMOVATE) 0.05 % ointment  1/15/24   Jairon Logan MD   doxycycline (ADOXA) 100 MG tablet Take 1 tablet by mouth As Needed. 1/16/24   Jairon Logan MD   fluocinonide (LIDEX) 0.05 % external solution  4/16/24   Jairon Logan MD   gabapentin (NEURONTIN) 300 MG capsule Take 1 capsule by mouth 3 (Three) Times a Day.    Jairon Logan MD   Ivermectin 1 % cream     Jairon Logan MD   Magnesium 400 MG capsule     Jairon Logan MD   Methylsulfonylmethane (MSM PO) Take  by mouth.    Jairon Logan MD   NON FORMULARY Apply 1 dose topically to the appropriate area as directed Daily. Dermal- vit patch    Jairon Logan MD   Omega-3 Fatty Acids (fish oil) 1000 MG capsule capsule     Jairon Logan MD   omeprazole (priLOSEC) 20 MG capsule  "TAKE 1 CAPSULE BY MOUTH DAILY 11/15/24   Jud Jang PA-C   omeprazole OTC (PriLOSEC OTC) 20 MG EC tablet Take 1 tablet by mouth Daily. 9/27/23   Jud Jang PA-C   QUERCETIN PO Take  by mouth Daily.    ProviderJairon MD   tiZANidine (ZANAFLEX) 4 MG tablet Take 1 tablet by mouth At Night As Needed for Muscle Spasms.    Jairon Logan MD   Unable to find 1 each 1 (One) Time. liposomal Vit C    ProviderJairon MD        Social History:   Social History     Tobacco Use    Smoking status: Former     Current packs/day: 0.00     Average packs/day: 0.5 packs/day for 10.0 years (5.0 ttl pk-yrs)     Types: Cigarettes     Start date: 12/7/2010     Quit date: 12/7/2020     Years since quitting: 3.9    Smokeless tobacco: Never   Vaping Use    Vaping status: Never Used   Substance Use Topics    Alcohol use: Yes     Alcohol/week: 2.0 standard drinks of alcohol     Types: 2 Drinks containing 0.5 oz of alcohol per week     Comment: Occasional    Drug use: Never         Review of Systems:  Review of Systems   Constitutional:  Negative for chills and fever.   HENT:  Negative for congestion, rhinorrhea and sore throat.    Eyes:  Negative for pain and visual disturbance.   Respiratory:  Negative for apnea, cough, chest tightness and shortness of breath.    Cardiovascular:  Negative for chest pain and palpitations.   Gastrointestinal:  Positive for abdominal pain, constipation and diarrhea. Negative for nausea and vomiting.   Genitourinary:  Negative for difficulty urinating and dysuria.   Musculoskeletal:  Negative for joint swelling and myalgias.   Skin:  Negative for color change.   Neurological:  Negative for seizures and headaches.   Psychiatric/Behavioral: Negative.     All other systems reviewed and are negative.       Physical Exam:  /62 (BP Location: Right arm, Patient Position: Sitting)   Pulse 89   Temp 98 °F (36.7 °C) (Oral)   Resp 18   Ht 170.2 cm (67\")   Wt 97.9 kg (215 lb " 13.3 oz)   LMP 09/11/2022 (Approximate)   SpO2 100%   BMI 33.80 kg/m²     Physical Exam  Vitals and nursing note reviewed.   Constitutional:       General: She is not in acute distress.     Appearance: Normal appearance. She is not toxic-appearing.   HENT:      Head: Normocephalic and atraumatic.      Jaw: There is normal jaw occlusion.   Eyes:      General: Lids are normal.      Extraocular Movements: Extraocular movements intact.      Conjunctiva/sclera: Conjunctivae normal.      Pupils: Pupils are equal, round, and reactive to light.   Cardiovascular:      Rate and Rhythm: Normal rate and regular rhythm.      Pulses: Normal pulses.      Heart sounds: Normal heart sounds.   Pulmonary:      Effort: Pulmonary effort is normal. No respiratory distress.      Breath sounds: Normal breath sounds. No wheezing or rhonchi.   Abdominal:      General: Abdomen is flat.      Palpations: Abdomen is soft.      Tenderness: There is abdominal tenderness in the left upper quadrant. There is no guarding or rebound.   Musculoskeletal:         General: Normal range of motion.      Cervical back: Normal range of motion and neck supple.      Right lower leg: No edema.      Left lower leg: No edema.   Skin:     General: Skin is warm and dry.   Neurological:      Mental Status: She is alert and oriented to person, place, and time. Mental status is at baseline.   Psychiatric:         Mood and Affect: Mood normal.                  Procedures:  Procedures      Medical Decision Making:      Comorbidities that affect care:    Hyperlipidemia, diabetes insipidus, thyroid disease    External Notes reviewed:          The following orders were placed and all results were independently analyzed by me:  Orders Placed This Encounter   Procedures    CT Abdomen Pelvis With Contrast    [employer] Draw    Comprehensive Metabolic Panel    Lipase    Urinalysis With Microscopic If Indicated (No Culture) - Urine, Clean Catch    CBC Auto Differential     Ambulatory Referral to Gastroenterology    NPO Diet NPO Type: Strict NPO    Undress & Gown    Insert Peripheral IV    CBC & Differential    Green Top (Gel)    Lavender Top    Gold Top - SST    Light Blue Top       Medications Given in the Emergency Department:  Medications   sodium chloride 0.9 % flush 10 mL (has no administration in time range)   sodium chloride 0.9 % bolus 500 mL (500 mL Intravenous New Bag 11/25/24 1528)   iopamidol (ISOVUE-370) 76 % injection 100 mL (90 mL Intravenous Given 11/25/24 1609)        ED Course:         Labs:    Lab Results (last 24 hours)       Procedure Component Value Units Date/Time    POCT URINALYSIS DIPSTICK, AUTOMATED [[id number]] Collected: 11/25/24 1102    Specimen: Urine Updated: 11/25/24 1204     Color, UA Yellow     Appearance, Fluid Clear     Specific Gravity, UA 1.005     pH, UA 7     Leukocytes, UA Negative     Nitrite, UA Negative     Total Protein, urine Negative     Glucose, UA Normal     External Ketones, Urine Negative     Urobilinogen, UA Normal mg/dL      External Bilirubin, Urine Negative     Blood, UA Negative     QC Acceptable     Lot Number [id number]     Expiration Date 3/25     Comment --    CBC & Differential [[id number]]  (Abnormal) Collected: 11/25/24 1213    Specimen: Blood from Arm, Left Updated: 11/25/24 1224    Narrative:      The following orders were created for panel order CBC & Differential.  Procedure                               Abnormality         Status                     ---------                               -----------         ------                     CBC Auto Differential[[id number]]        Abnormal            Final result                 Please view results for these tests on the individual orders.    Comprehensive Metabolic Panel [[id number]]  (Abnormal) Collected: 11/25/24 1213    Specimen: Blood from Arm, Left Updated: 11/25/24 1244     Glucose 96 mg/dL      BUN 5 mg/dL      Creatinine 0.74 mg/dL      Sodium 138 mmol/L      Potassium  3.7 mmol/L      Chloride 102 mmol/L      CO2 25.7 mmol/L      Calcium 9.4 mg/dL      Total Protein 7.7 g/dL      Albumin 4.7 g/dL      ALT (SGPT) 11 U/L      AST (SGOT) 15 U/L      Alkaline Phosphatase 57 U/L      Total Bilirubin 0.5 mg/dL      Globulin 3.0 gm/dL      A/G Ratio 1.6 g/dL      BUN/Creatinine Ratio 6.8     Anion Gap 10.3 mmol/L      eGFR 101.8 mL/min/1.73     Narrative:      GFR Normal >60  Chronic Kidney Disease <60  Kidney Failure <15      Lipase [[id number]]  (Normal) Collected: 11/25/24 1213    Specimen: Blood from Arm, Left Updated: 11/25/24 1244     Lipase 18 U/L     CBC Auto Differential [[id number]]  (Abnormal) Collected: 11/25/24 1213    Specimen: Blood from Arm, Left Updated: 11/25/24 1224     WBC 7.75 10*3/mm3      RBC 4.69 10*6/mm3      Hemoglobin 14.4 g/dL      Hematocrit 43.1 %      MCV 91.9 fL      MCH 30.7 pg      MCHC 33.4 g/dL      RDW 12.0 %      RDW-SD 40.4 fl      MPV 10.7 fL      Platelets 322 10*3/mm3      Neutrophil % 72.5 %      Lymphocyte % 20.1 %      Monocyte % 4.4 %      Eosinophil % 2.2 %      Basophil % 0.5 %      Immature Grans % 0.3 %      Neutrophils, Absolute 5.62 10*3/mm3      Lymphocytes, Absolute 1.56 10*3/mm3      Monocytes, Absolute 0.34 10*3/mm3      Eosinophils, Absolute 0.17 10*3/mm3      Basophils, Absolute 0.04 10*3/mm3      Immature Grans, Absolute 0.02 10*3/mm3      nRBC 0.0 /100 WBC     Urinalysis With Microscopic If Indicated (No Culture) - Urine, Clean Catch [[id number]]  (Abnormal) Collected: 11/25/24 1516    Specimen: Urine, Clean Catch Updated: 11/25/24 1523     Color, UA Yellow     Appearance, UA Clear     pH, UA 6.5     Specific Gravity, UA <=1.005     Glucose, UA Negative     Ketones, UA Trace     Bilirubin, UA Negative     Blood, UA Negative     Protein, UA Negative     Leuk Esterase, UA Negative     Nitrite, UA Negative     Urobilinogen, UA 0.2 E.U./dL    Narrative:      Urine microscopic not indicated.             Imaging:    CT Abdomen Pelvis  With Contrast    Result Date: 11/25/2024  CT ABDOMEN PELVIS W CONTRAST Date of Exam: 11/25/2024 4:05 PM EST Indication: RUQ abdomnial pain. Comparison: None available. Technique: Axial CT images were obtained of the abdomen and pelvis after the uneventful intravenous administration of iodinated contrast. Reconstructed coronal and sagittal images were also obtained. Automated exposure control and iterative construction methods were used. Findings: LUNG BASES:  Unremarkable without mass or infiltrate. LIVER: There are 2 nonspecific hypodense nodules versus complex cysts within the liver, a 1.4 cm anterior medial segment left hepatic lobe lesion (image 37, series 2) and a 1.7 cm posterior segment right hepatic lobe lesion (image 48, series 2). Nonemergent follow-up hepatic MRI with and without contrast is recommended given the nonspecificity. BILIARY/GALLBLADDER:  Unremarkable SPLEEN:  Unremarkable PANCREAS:  Unremarkable ADRENAL:  Unremarkable KIDNEYS:  Unremarkable parenchyma with no solid mass identified. No obstruction.  No calculus identified. GASTROINTESTINAL/MESENTERY:  No evidence of obstruction nor inflammation.  MESENTERIC VESSELS:  Patent. AORTA/IVC:  Normal caliber. RETROPERITONEUM/LYMPH NODES:  Unremarkable REPRODUCTIVE: The uterus is surgically absent. There is a 3 cm simple left ovarian cyst. No follow-up suggested. BLADDER:  Unremarkable OSSEUS STRUCTURES:  Typical for age with no acute process identified.     Impression: 1.No acute abnormality identified within the abdomen or pelvis. 2.There are 2 nonspecific hypodense nodules versus complex cysts within the liver. Nonemergent follow-up hepatic MRI with and without contrast is recommended given their nonspecificity. 3.There is a 3 cm simple left ovarian cyst. No follow-up suggested. Electronically Signed: Murphy Persaud MD  11/25/2024 4:20 PM EST  Workstation ID: [id number]       Differential Diagnosis and Discussion:    Abdominal Pain: Based on the  patient's signs and symptoms, I considered abdominal aortic aneurysm, small bowel obstruction, pancreatitis, acute cholecystitis, acute appendecitis, peptic ulcer disease, gastritis, colitis, endocrine disorders, irritable bowel syndrome and other differential diagnosis an etiology of the patient's abdominal pain.    All labs were reviewed and interpreted by me.  CT scan radiology impression was interpreted by me.    MDM     CBC shows no evidence of leukocytosis.  Urinalysis shows no evidence UTI.  CT abdomen pelvis with contrast shows No acute abnormality identified within the abdomen or pelvis. There are 2 nonspecific hypodense nodules versus complex cysts within the liver. Nonemergent follow-up hepatic MRI with and without contrast is recommended given their nonspecificity. There is a 3 cm simple left ovarian cyst. No follow-up suggested.  I will provide an ambulatory referral to gastroenterology for follow-up.  I instructed patient to follow-up with her PCP regarding serial imaging of liver.  I instructed patient to return to ED in the meantime she develops any new or worsening symptoms.  Patient states she understands and agrees with plan of care.                Patient Care Considerations:          Consultants/Shared Management Plan:    None    Social Determinants of Health:    Patient is independent, reliable, and has access to care.       Disposition and Care Coordination:    Discharged: The patient is suitable and stable for discharge with no need for consideration of admission.    I have explained the patient´s condition, diagnoses and treatment plan based on the information available to me at this time. I have answered questions and addressed any concerns. The patient has a good  understanding of the patient´s diagnosis, condition, and treatment plan as can be expected at this point. The vital signs have been stable. The patient´s condition is stable and appropriate for discharge from the emergency  department.      The patient will pursue further outpatient evaluation with the primary care physician or other designated or consulting physician as outlined in the discharge instructions. They are agreeable to this plan of care and follow-up instructions have been explained in detail. The patient has received these instructions in written format and has expressed an understanding of the discharge instructions. The patient is aware that any significant change in condition or worsening of symptoms should prompt an immediate return to this or the closest emergency department or call to 911.  I have explained discharge medications and the need for follow up with the patient/caretakers. This was also printed in the discharge instructions. Patient was discharged with the following medications and follow up:      Medication List        New Prescriptions      dicyclomine 20 MG tablet  Commonly known as: BENTYL  Take 1 tablet by mouth Every 8 (Eight) Hours As Needed for Abdominal Cramping for up to 5 days.               Where to Get Your Medications        These medications were sent to [facility] [id number] - [place], [place] - [address] [facility] [address] - [phone]  - [phone]   [address], [place] [address]      Phone: [phone]   dicyclomine 20 MG tablet      Regina Mckenzie MD  [address]  [place] [address]  [phone]    Call in 1 day  To schedule follow-up       Final diagnoses:   Abdominal pain, unspecified abdominal location   Liver cyst        ED Disposition       ED Disposition   Discharge    Condition   Stable    Comment   --               This medical record created using voice recognition software.             Manohar Garcia PA-C  11/25/24 2591

## 2024-11-26 ENCOUNTER — TELEPHONE (OUTPATIENT)
Dept: GASTROENTEROLOGY | Facility: CLINIC | Age: 44
End: 2024-11-26
Payer: MEDICAID

## 2024-11-26 NOTE — TELEPHONE ENCOUNTER
I have called the pt and advised she does not need permission from our office to transfer.  Pt verbalized understanding

## 2024-11-26 NOTE — TELEPHONE ENCOUNTER
Caller: Vanessa Giang    Relationship: Self    Best call back number: [phone]    Who is your current provider: TITA LOWERY     Who would you like your new provider to be: JERRY LEE     What are your reasons for transferring care: PATIENT WOULD LIKE TO HAVE CARE CLOSER TO HOME     Additional notes: PATIENT WAS SEEN AT [facility] ED 11-25-24 REFERRED TO DR LEE AND PATIENT STATES SHE WAS HOPING TO HAVE A DOCTOR CLOSER TO HOME  PENDING REFERRAL IN PATIENTS CHART WAITING UPON REVIEW

## 2025-01-08 ENCOUNTER — OFFICE VISIT (OUTPATIENT)
Dept: GASTROENTEROLOGY | Facility: CLINIC | Age: 45
End: 2025-01-08
Payer: MEDICAID

## 2025-01-08 ENCOUNTER — LAB (OUTPATIENT)
Facility: HOSPITAL | Age: 45
End: 2025-01-08
Payer: MEDICAID

## 2025-01-08 DIAGNOSIS — R14.0 ABDOMINAL BLOATING: ICD-10-CM

## 2025-01-08 DIAGNOSIS — Z87.19 HISTORY OF BARRETT ESOPHAGUS: ICD-10-CM

## 2025-01-08 DIAGNOSIS — K76.9 LIVER LESION: ICD-10-CM

## 2025-01-08 DIAGNOSIS — R11.0 NAUSEA: ICD-10-CM

## 2025-01-08 DIAGNOSIS — R10.12 LUQ ABDOMINAL PAIN: ICD-10-CM

## 2025-01-08 DIAGNOSIS — K58.1 IRRITABLE BOWEL SYNDROME WITH CONSTIPATION: Primary | ICD-10-CM

## 2025-01-08 DIAGNOSIS — K58.1 IRRITABLE BOWEL SYNDROME WITH CONSTIPATION: ICD-10-CM

## 2025-01-08 PROCEDURE — 86003 ALLG SPEC IGE CRUDE XTRC EA: CPT

## 2025-01-08 PROCEDURE — 86008 ALLG SPEC IGE RECOMB EA: CPT

## 2025-01-08 PROCEDURE — 86364 TISS TRNSGLTMNASE EA IG CLAS: CPT

## 2025-01-08 PROCEDURE — 86258 DGP ANTIBODY EACH IG CLASS: CPT

## 2025-01-08 PROCEDURE — 82785 ASSAY OF IGE: CPT

## 2025-01-08 PROCEDURE — 36415 COLL VENOUS BLD VENIPUNCTURE: CPT

## 2025-01-08 PROCEDURE — 82784 ASSAY IGA/IGD/IGG/IGM EACH: CPT

## 2025-01-08 NOTE — PROGRESS NOTES
Chief Complaint     Constipation    History of Present Illness     Vanessa Giang is a 44 y.o. female who presents to [facility] on referral from JEFFERSON Burton for a gastroenterology evaluation of constipation.      She reports struggling with constipation for many years.  She's been evaluated by a few different gastroenterologist in the past 15 years.  Most recently was following by Dr. Stevens's office in [place].      Previous records reviewed:      Failed Trulance (diarrhea, headache, and nausea), Amitiza (unable to try due to red dye allergy). Miralax daily (titrating) which didn't help. Fiber (bloating).    9/27/23 gastric emptying study-normal.  Colonoscopy performed 1/26/2024 with 1 polyp removed.  Recommend recall in 5 years.    Reviewed records under media from 2014: She has history of esophageal manometry on 4/30/2010 with Dr. Atkins that showed a high percent esophageal peristalsis, 20 mmHg LES pressure, incomplete relaxation.  Records from [facility] on 7/22/2015 found in media under 8/11/2022 date: Reports history of GERD and inlet patch s/post BARRX ablation November 2014.    She had a pituitary tumor that was causing cushings and this was removed.      She has changed her diet.  She had viom testing to determine which foods aren't good for gut microbiom.  She's been following the recommendations of this test since November.  She's lost twenty pounds.  She's avoiding dairy.  Eats a salad almost every day, but avoids kale, christina greens, garlic, onions, broccoli and cauliflower.      Hasn't had a normal bowel movement since the beginning of November.  She has tried prune juice, castor oil packs, castor oil pills, miralax, ex-lax, stool softeners, senna.  She has tried these several days consecutively until she has a bowel movement.  Taking magnesium and vitamin C.      Prior to November, she was having a bowel movement daily.  Would use miralax as needed in her  coffee.      States that she has pain in LUQ that she describes to be present almost constantly.  This has been present for years.  It's intermittent, but has been worsening for the past 2 months.      She is experiencing abdominal bloating with some foods.  States that her bra causes discomfort because it sits on her stomach.      She is taking digestive enzymes with each meal.      Admits nausea.  Takes omeprazole daily, but doesn't feel that she needs it as she is not experience heartburn or reflux.         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    Developed post surgery After tumor was removed from pituitary gland    Heart murmur     Hernia     Hiatal hernia    Hyperlipidemia     Iron deficiency anemia secondary to inadequate dietary iron intake 03/01/2023    Lactose intolerance     Liver disease     [place]    Sleep apnea     no longer present       Past Surgical History:   Procedure Laterality Date    ABDOMINAL SURGERY      Endometrial ablation    ANKLE SURGERY Right 2017    COLONOSCOPY N/A 01/26/2024    Procedure: COLONOSCOPY TO CECUM WITH POLYPECTOMY;  Surgeon: Ida Stevens MD;  Location: [facility] OR;  Service: Gastroenterology;  Laterality: N/A;  Hemorrhoids, polyp    ENDOSCOPY  2016    Esophageal ablation    ENDOSCOPY N/A 08/31/2022    Procedure: ESOPHAGOGASTRODUODENOSCOPY WITH BX'S;  Surgeon: Ida Stevens MD;  Location: [facility] ENDOSCOPY;  Service: Gastroenterology;  Laterality: N/A;  pre: LUQ PAIN, HEARTBURN  post: IRREGULAR Z-LINE, ESOPHAGITIS    HYSTERECTOMY      TRANSPHENOIDAL / TRANSNASAL HYPOPHYSECTOMY / RESECTION PITUITARY TUMOR  04/2022    UPPER GASTROINTESTINAL ENDOSCOPY         Family History   Problem Relation Age of Onset    Alcohol abuse Mother      Hyperlipidemia Mother     Hypertension Mother     Alcohol abuse Father     Colon cancer Maternal Grandfather     Arrhythmia Maternal Grandfather     Hyperlipidemia Maternal Grandfather     Cancer Paternal Grandfather 70        colon cancer    Heart disease Maternal Grandmother     Malig Hyperthermia Neg Hx         Current Medications        Current Outpatient Medications:     ascorbic acid (VITAMIN C) 100 MG tablet, , Disp: , Rfl:     Black Elderberry 50 MG/5ML syrup, Take  by mouth., Disp: , Rfl:     cholecalciferol (VITAMIN D3) 1.25 MG (86546 UT) capsule, Take  by mouth., Disp: , Rfl:     clobetasol (TEMOVATE) 0.05 % ointment, , Disp: , Rfl:     fluocinonide (LIDEX) 0.05 % external solution, , Disp: , Rfl:     gabapentin (NEURONTIN) 300 MG capsule, Take 1 capsule by mouth 3 (Three) Times a Day., Disp: , Rfl:     Magnesium 400 MG capsule, , Disp: , Rfl:     Methylsulfonylmethane (MSM PO), Take  by mouth., Disp: , Rfl:     Omega-3 Fatty Acids (fish oil) 1000 MG capsule capsule, , Disp: , Rfl:     omeprazole (priLOSEC) 20 MG capsule, TAKE 1 CAPSULE BY MOUTH DAILY, Disp: 90 capsule, Rfl: 1    QUERCETIN PO, Take  by mouth Daily., Disp: , Rfl:     Unable to find, 1 each 1 (One) Time. liposomal Vit C, Disp: , Rfl:      Allergies     Allergies   Allergen Reactions    Cocamidopropyl Betaine Rash    Linalool Rash    Nickel Rash    Fd&C Blue #2 (Indigotine) Hives    Adhesive Tape Rash    Avocado Rash    Bee Pollen Rash    Benzalkonium Rash    Benzalkonium Chloride Rash    [place] Itching and Rash    [place] Red [Red Dye #40 (Allura Red)] Rash    Formaldehyde Rash    Gold Rash    Latex Rash    Lavender Oil Hives    Other Rash     All fragrance    Propolis Hives    Quaternium-15 Rash    Tea Tree Oil Rash       Social History       Social History     Social History Narrative    Not on file       Immunizations     Immunization:    There is no immunization history on file for this patient.       Objective     Objective     Vital  Signs:   There were no vitals taken for this visit.      Physical Exam    Results      Result Review :   The following data was reviewed by: JEFFERSON Benavides on 01/08/2025:    CBC w/diff          4/4/2024    08:29 11/25/2024    12:13   CBC w/Diff   WBC 5.04     7.75    RBC 4.68     4.69    Hemoglobin 14.6     14.4    Hematocrit 43.5     43.1    MCV 92.9     91.9    MCH 31.2     30.7    MCHC 33.6     33.4    RDW 11.3     12.0    Platelets 236     322    Neutrophil Rel % 51.4     72.5    Immature Granulocyte Rel % 0.0     0.3    Lymphocyte Rel % 34.5     20.1    Monocyte Rel % 7.5     4.4    Eosinophil Rel % 6.0     2.2    Basophil Rel % 0.6     0.5       Details          This result is from an external source.             CMP          11/25/2024    12:13   CMP   Glucose 96    BUN 5    Creatinine 0.74    EGFR 101.8    Sodium 138    Potassium 3.7    Chloride 102    Calcium 9.4    Total Protein 7.7    Albumin 4.7    Globulin 3.0    Total Bilirubin 0.5    Alkaline Phosphatase 57    AST (SGOT) 15    ALT (SGPT) 11    Albumin/Globulin Ratio 1.6    BUN/Creatinine Ratio 6.8    Anion Gap 10.3        Lipase   Lipase   Date Value Ref Range Status   11/25/2024 18 13 - 60 U/L Final   06/16/2022 28 10 - 50 Units/Liter Final     11/25/2024 CT abdomen pelvis with contrast-There are 2 nonspecific hypodense nodules versus complex cysts within the liver, a 1.4 cm anterior medial segment left hepatic lobe lesion (image 37, series 2) and a 1.7 cm posterior segment right hepatic lobe lesion (image 48, series 2).   Nonemergent follow-up hepatic MRI with and without contrast is recommended given the nonspecificity.  Normal gallbladder.  No evidence of obstruction or inflammation.  3 cm simple left ovarian cyst.    8/31/2022 EGD (Dr. Stevens)-grade A esophagitis.  The Z-line was irregular, biopsy-mild reflux esophagitis.  The stomach was normal, biopsy-chronic focally active gastritis, negative for H. pylori.  Normal duodenum,  biopsy-normal.    1/26/2024 colonoscopy (Dr. Stevens)-4 mm polyp in the cecum-tubular adenoma, completely removed.  Grade 1 internal hemorrhoids.  Otherwise normal mucosa noted throughout the colon.    Records from [facility] on 7/22/2015 found in media under 8/11/2022 date: Reports history of GERD and inlet patch s/post BARRX ablation November 2014.      Assessment and Plan        Assessment and Plan    Diagnoses and all orders for this visit:    1. Irritable bowel syndrome with constipation (Primary)  -     Alpha-Gal IgE Panel; Future  -     Celiac Panel Reflex To Titer; Future  -     Sucrose Breath Test; Future  -     Food Allergy Profile; Future    2. Liver lesion  -     MRI Abdomen With & Without Contrast; Future    3. Abdominal bloating  -     Alpha-Gal IgE Panel; Future  -     Celiac Panel Reflex To Titer; Future  -     Sucrose Breath Test; Future  -     Food Allergy Profile; Future    4. History of Vasquez esophagus    5. Nausea    6. LUQ abdominal pain    Lengthy discussion with patient and  regarding etiology of symptoms.  She is concerned about the etiology of her IBS symptoms and is frustrated by previous workup and no clear answers as to the cause of her lifelong constipation.  We did discuss that her constipation seemed to get worse when she implemented dietary changes and what sounds like a lower fiber diet.  Recommend to check labs to rule out alpha gal, gluten and food allergies and sucrase deficiency.  We also discussed trial of ibsrela for treatment of symptoms.  Written information was given to patient.  She would like to research this medication and make a decision on if she would like to try it.  We also discussed referral to c/r surgery in the future if no improvement in symptoms.  Imaging ordered to further characterize liver lesions.      I spent 90 minutes caring for Vanessa on this date of service. This time includes time spent by me in the following activities:preparing for the visit,  reviewing tests, performing a medically appropriate examination and/or evaluation , counseling and educating the patient/family/caregiver, ordering medications, tests, or procedures, documenting information in the medical record, and independently interpreting results and communicating that information with the patient/family/caregiver  Follow Up        Follow Up   Return in about 6 months (around 7/8/2025) for IBS.  Patient was given instructions and counseling regarding her condition or for health maintenance advice. Please see specific information pulled into the AVS if appropriate.

## 2025-01-09 LAB
GLIADIN PEPTIDE IGA SER-ACNC: 10 UNITS (ref 0–19)
IGA SERPL-MCNC: 214 MG/DL (ref 87–352)
TTG IGA SER-ACNC: <2 U/ML (ref 0–3)

## 2025-01-10 LAB
CLAM IGE QN: <0.1 KU/L
CODFISH IGE QN: <0.1 KU/L
CONV CLASS DESCRIPTION: NORMAL
CORN IGE QN: <0.1 KU/L
COW MILK IGE QN: <0.1 KU/L
EGG WHITE IGE QN: <0.1 KU/L
PEANUT IGE QN: <0.1 KU/L
SCALLOP IGE QN: <0.1 KU/L
SESAME SEED IGE QN: <0.1 KU/L
SHRIMP IGE QN: <0.1 KU/L
SOYBEAN IGE QN: <0.1 KU/L
WALNUT IGE QN: <0.1 KU/L
WHEAT IGE QN: <0.1 KU/L

## 2025-01-13 LAB
ALPHA-GAL IGE QN: <0.1 KU/L
BEEF IGE QN: <0.1 KU/L
CONV CLASS DESCRIPTION: NORMAL
IGE SERPL-ACNC: 22 IU/ML (ref 6–495)
LAMB IGE QN: <0.1 KU/L
PORK IGE QN: <0.1 KU/L

## 2025-01-15 ENCOUNTER — PATIENT ROUNDING (BHMG ONLY) (OUTPATIENT)
Dept: GASTROENTEROLOGY | Facility: CLINIC | Age: 45
End: 2025-01-15
Payer: MEDICAID

## 2025-01-15 NOTE — PROGRESS NOTES
"1/15/2025      Hel, may I speak with Vanessa Giang     My name is Karina. I am calling from [facility]. I show that you had a recent visit with JEFFERSON Hoyos.    Before we get started may I verify your date of birth? 1980    I am calling to officially welcome you to our practice and ask about your recent visit. Is this a good time to talk?  Yes     Tell me about your visit with us. What things went well? \"Abi was nice & wait time was great\"    We strive to ensure that we protect your safety and privacy. Is there anything we could have done to improve this during your visit?    No     We're always looking for ways to make our patients' experiences even better. Do you have recommendations on ways we may improve?    No     Overall were you satisfied with your first visit to our practice?  Yes     I appreciate you taking the time to speak with me today. Is there anything else I can do for you?    No     I am glad to hear that you had a very good visit and I appreciate you taking the time to provide feedback on this call. We would greatly appreciate you filling out a survey if you receive one in the mail, email or text. This is a great opportunity to provide any additional feedback that you may think of after this call as well.       Thank you, and have a great day.   "

## 2025-01-22 ENCOUNTER — TELEPHONE (OUTPATIENT)
Dept: GASTROENTEROLOGY | Facility: CLINIC | Age: 45
End: 2025-01-22
Payer: MEDICAID

## 2025-01-22 NOTE — TELEPHONE ENCOUNTER
----- Message from Darlene Jaffe sent at 1/22/2025 12:02 PM EST -----  Please let pt know that sucrose breath test is normal.

## 2025-02-10 ENCOUNTER — HOSPITAL ENCOUNTER (OUTPATIENT)
Dept: MRI IMAGING | Facility: HOSPITAL | Age: 45
Discharge: HOME | End: 2025-02-10
Admitting: NURSE PRACTITIONER
Payer: MEDICAID

## 2025-02-10 DIAGNOSIS — K76.9 LIVER LESION: ICD-10-CM

## 2025-02-10 PROCEDURE — 74183 MRI ABD W/O CNTR FLWD CNTR: CPT

## 2025-02-10 PROCEDURE — 25510000002 GADOBENATE DIMEGLUMINE 529 MG/ML SOLUTION: Performed by: NURSE PRACTITIONER

## 2025-02-10 PROCEDURE — A9577 INJ MULTIHANCE: HCPCS | Performed by: NURSE PRACTITIONER

## 2025-02-10 RX ADMIN — GADOBENATE DIMEGLUMINE 19 ML: 529 INJECTION, SOLUTION INTRAVENOUS at 15:16

## 2025-02-13 ENCOUNTER — TELEPHONE (OUTPATIENT)
Dept: GASTROENTEROLOGY | Facility: CLINIC | Age: 45
End: 2025-02-13
Payer: MEDICAID

## 2025-02-13 NOTE — TELEPHONE ENCOUNTER
----- Message from Darlene Jaffe sent at 2/13/2025 12:49 PM EST -----  Please let pt know that MRI shows that lesions in the liver are both benign hemangiomas (collection of blood vessels).  These are small and do not require follow up imaging.  Her liver otherwise is normal.  Normal gallbladder with stones in the gallbladder, but no inflammation of the gallbladder. If she's continuing to experience upper abdominal pain that's worse with meals, could consider HIDA scan to determine gallbladder function as this could be causing some of her symptoms (bloating, nausea and pain).

## 2025-02-17 ENCOUNTER — PATIENT MESSAGE (OUTPATIENT)
Dept: GASTROENTEROLOGY | Facility: CLINIC | Age: 45
End: 2025-02-17
Payer: MEDICAID

## 2025-02-17 DIAGNOSIS — R11.0 NAUSEA: ICD-10-CM

## 2025-02-17 DIAGNOSIS — R14.0 ABDOMINAL BLOATING: Primary | ICD-10-CM

## 2025-02-20 ENCOUNTER — LAB (OUTPATIENT)
Dept: LAB | Facility: HOSPITAL | Age: 45
End: 2025-02-20
Payer: MEDICAID

## 2025-02-20 DIAGNOSIS — R11.0 NAUSEA: ICD-10-CM

## 2025-02-20 DIAGNOSIS — R14.0 ABDOMINAL BLOATING: ICD-10-CM

## 2025-02-20 PROCEDURE — 83013 H PYLORI (C-13) BREATH: CPT

## 2025-02-21 LAB — UREA BREATH TEST QL: NEGATIVE

## 2025-02-24 ENCOUNTER — TELEPHONE (OUTPATIENT)
Dept: GASTROENTEROLOGY | Facility: CLINIC | Age: 45
End: 2025-02-24
Payer: MEDICAID

## 2025-04-02 NOTE — PROGRESS NOTES
"=Chief Complaint  Consult (Breast reduction)    Subjective          History of Present Illness  Vanessa Giang is a 44 y.o. female with history of morbid obesity, history of Cushing's disease (pituitary adenoma), fibromyalgia, neuropathy and symptomatic macromastia who presents to [facility] as a consult and would like to discuss breast reduction.      Patient used to weigh as much as 275 lbs until she had pituitary tumor removed in 2022.  Since then she had approximately 100 pound weight loss. Her current weight is 183lbs.   In November 2024 she had significantly changed her diet and cut out sugar.  She has continued to lose approximately 5 pounds per month.  She does not have goal weight and has not changed her diet with a goal to lose weight.    Patient has had large breasts since her pregnancy and being morbidly obese in the past.     As a result of her longstanding macromastia she has had neck pain, back pain, shoulder grooving and recurrent rashes under her breasts.  She is using nystatin under her breast to help control her rashes. She denies current rashes under he breasts. She is not as active as she would like to be due to her large breasts. She feels rashes are worse since weight loss as her beasts are even more deflated.   She has been going to chiropractor for 26 years and has had back surgery. Went to physical therapy last year several times.    Her clothing doesn't fit well due to excess loose skin throughout her body.     Her current bra size 42 F and she would like to be \"something smaller\". She denies personal history of breast pathology. No immediate family history of breast or ovarian cancer. Maternal cousin with breast cancer.    She has been compliant with her yearly mammograms and most recent mammogram on 10/18/24 showed benign results with Birads I (Normal). Patient denies any concerning masses, skin retraction or nipple drainage. She has " never had breast biopsy.  She feels her breast are asymmetrical. She has normal present sensation in bilateral nipples.       Patient has a history of 1 pregnancies and 1 of births. She breast fed for 1 child for  16months    She had hysterectomy in the past. She is not on any blood thinners. She has neuropathy and is on gabapentin. She has multiple allergies.     Patient has good support system and her  will take care of her postoperatively.      Patient is taking the following over the counter supplements:  MSM, magnesium complex, blackberry extract, glucosamine and collagen, quercetin, fish oil, Ca, Vit C, multivitamin.    Patient denies chest pain or shortness of breath, no nausea vomiting.  No peripheral edema.  No unintentional weight loss or chills.  Patient denies personal or family history of excessive bleeding or blood clots, no adverse anesthesia reaction.  No recent illnesses.         Allergies: Cocamidopropyl betaine, Linalool, Nickel, Fd&c blue #2 (indigotine), Adhesive tape, Avocado, Bee pollen, Benzalkonium, Benzalkonium chloride, [place], [place] red [red dye #40 (allura red)], Formaldehyde, Gold, Latex, Lavender oil, Other, Propolis, Quaternium-15, and Tea tree oil  Allergies Reconciled.    Review of Systems  All system were reviewed and were negative, except the ones noted above.     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    Developed post surgery After tumor was removed from pituitary gland    Heart murmur     Hernia     Hiatal hernia    Hyperlipidemia     Iron deficiency anemia secondary to inadequate dietary iron intake 03/01/2023    Lactose intolerance     Liver disease     [place]    Sleep apnea     no longer present      Past Surgical History:   Procedure Laterality Date     ABDOMINAL SURGERY      Endometrial ablation    ANKLE SURGERY Right 2017    COLONOSCOPY N/A 01/26/2024    Procedure: COLONOSCOPY TO CECUM WITH POLYPECTOMY;  Surgeon: Ida Stevens MD;  Location: [facility] MAIN OR;  Service: Gastroenterology;  Laterality: N/A;  Hemorrhoids, polyp    ENDOSCOPY  2016    Esophageal ablation    ENDOSCOPY N/A 08/31/2022    Procedure: ESOPHAGOGASTRODUODENOSCOPY WITH BX'S;  Surgeon: Ida Stevens MD;  Location: [facility] ENDOSCOPY;  Service: Gastroenterology;  Laterality: N/A;  pre: LUQ PAIN, HEARTBURN  post: IRREGULAR Z-LINE, ESOPHAGITIS    HYSTERECTOMY      TRANSPHENOIDAL / TRANSNASAL HYPOPHYSECTOMY / RESECTION PITUITARY TUMOR  04/2022    UPPER GASTROINTESTINAL ENDOSCOPY        Family History   Problem Relation Age of Onset    Alcohol abuse Mother     Hyperlipidemia Mother     Hypertension Mother     Alcohol abuse Father     Colon cancer Maternal Grandfather     Arrhythmia Maternal Grandfather     Hyperlipidemia Maternal Grandfather     Cancer Paternal Grandfather 70        colon cancer    Heart disease Maternal Grandmother     Malig Hyperthermia Neg Hx       No family history of blood clots or excessive bleeding.  No history of adverse anesthesia reactions.           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      calcium carbonate (OS-GISELA) 600 MG tablet Take 1 tablet by mouth Daily.      cholecalciferol (VITAMIN D3) 1.25 MG (06634 UT) capsule Take  by mouth.      clobetasol (TEMOVATE) 0.05 % ointment        "fluocinonide (LIDEX) 0.05 % external solution       gabapentin (NEURONTIN) 100 MG capsule       glucosamine sulfate 500 MG capsule capsule Take  by mouth 3 (Three) Times a Day With Meals.      Magnesium 400 MG capsule       Methylsulfonylmethane (MSM PO) Take  by mouth.      Multiple Vitamins-Minerals (b complex-C-E-zinc) tablet Take 1 tablet by mouth Daily.      Omega-3 Fatty Acids (fish oil) 1000 MG capsule capsule       QUERCETIN PO Take  by mouth Daily.      Unable to find 1 each 1 (One) Time. liposomal Vit C       No current facility-administered medications on file prior to visit.         Objective     /72 (BP Location: Right arm, Patient Position: Sitting, Cuff Size: Large Adult)   Pulse 65   Temp 98.6 °F (37 °C) (Temporal)   Ht 170.2 cm (67.01\")   Wt 83.2 kg (183 lb 6.4 oz)   SpO2 96%   BMI 28.72 kg/m²     Body mass index is 28.72 kg/m².    Physical Exam    General: pleasant woman in NAD  CV: RR, light murmur  Lungs: CTAB, no wheezing  Breast exam: Bilateral large breasts with grade 3 ptosis with breasts similar in size, right nipple-areolar complex is slightly smaller and in lower position compared to the left, loss of upper pole fullness, deflated breasts, overall reasonable symmetry.  Some moisture under both breast, but no active rash. Bilateral breast soft with no concerning masses, skin retraction or nipple drainage.  Multiple stria. Bilateral lateral chest soft tissue rolls  SN-N: R 35 cm, L 34 cm  IMF-N: R 15 cm, L 17 cm   Breast width: R 17 cm, L 17 cm   N-N: 24 cm  NAC: R 8.5 x 5.5 cm, L 8.5 x 6.5 cm  Chest circumference at IMF level: 40.5  inches  Lymph: No cervical, supraclavicular or axillary lymphadenopathy  Abdomen: Obese, soft, nontender nondistended, large pannus, no palpable hernias, moisture but no rash under the pannus  Extremities: No peripheral edema, full range of motion  Psych: Appropriate mood and affect  Neuro: Grossly intact    Schnur Scale Score: 575  Body surface area " is 1.95 meters squared.      Result Review :         Lab work on 11/25/2024: wnl including ACTH   Had a Daigle monitor in 2023   Had mild tricuspid and mitral regurgitation on echo in 2021   I do not see any recent CXR              Assessment and Plan      Diagnoses and all orders for this visit:    1. Breast hypertrophy in female (Primary)    2. Ptosis of breast    3. Excessive and redundant skin and subcutaneous tissue    4. Rash         Patient is a 44-year-old woman with history of morbid obesity, history of Cushing's disease due to pituitary adenoma, fibromyalgia, neuropathy and symptomatic macromastia who presents today for evaluation for bilateral breast reduction mammoplasty.  Mammogram is up-to-date.  She had hysterectomy in the past.  On physical exam patient has bilateral large breasts with grade 3 ptosis with reasonable symmetry.  No rashes under the breasts.   Both breasts are deflated with loss of upper pole fullness.  No concerning palpable masses, skin retraction or nipple drainage.  No concerning lymphadenopathy.    Patient would benefit from bilateral breast reduction mammoplasty.  Current BSA is 1.95 and I recommended 575 g of tissue to be removed from each side.  I discussed with the patient that while her breasts are fairly large her main issue is large skin envelope which is consistent with her history of massive weight loss.  This is a very common presentation for patients with significant weight loss.  While it is possible to remove 575 g of tissue from each breast this will substantially decrease her breast size.  However she will not be up flat chested.  She states she does not mind being fairly small.  Size varies based on the company, but I would suspect she will have a full B to C cup.    Breast reduction should  at least significantly decrease the risk of recurrent rashes under her breasts.  Neck and back pain can be caused by a number of different factors including  osteoarthritis, poor posture and others.  Hopefully with smaller breast this helps alleviate some of the symptoms.    Patient has fairly long distance from sternum to the nipple areolar complex. Discussed the use of SPY angiography during surgery. If there is any concern regarding nipple areolar complex perfusion at the time of surgery the recommendation is to remove the nipple areolar complex and transferred as a skin graft at the time of surgery.  This would leave nipple completely insensate.    I discussed the details and possible complications of the procedure including but not limited to bleeding, infection, seroma, scar, delayed wound healing, asymmetry, partial complete nipple loss, nipple sensation changes which is usually numbness, numbness is usually temporary but can be permanent, increased calcifications on mammogram and dog ears.       She was told she is at increased risk for dogears given the lateral chest soft tissue excess due to weight loss.     Also discussed the recommendation to wait 6 months after surgery to have the next mammogram.  Discussed the recommendation for weight stability for 6 months prior to any body contouring procedures.      Plan:  Preoperative photos obtained today   BSA 1.95 and I recommended removal of 575 g from each breast per Schnur scale  Patient understands this will potentially decrease her breast size and states she does not mind having small breasts  Mammogram 10/18/2024, BI-RADS 1  Discussed recommendation for weight stability for 6 months prior to any body contouring procedures.  When talking about symptomatic macromastia, depending on severity of patient's symptoms it may be reasonable to proceed with surgery at this time as well as she understands that any significant weight loss again in the future may affect her long-term results  Another option is to wait till her weight stabilizes ±5 pounds  She does not have goal weight in mind and continue to lose  approximately 5 pounds per month after she cut out sugar from her diet back in November  To proceed with surgery she will have to stop a number of her over-the-counter supplements some of them have blood thinning effects  Is because drain use, use of soft surgical bra, 6-week recovery period, no lifting more than 10 pounds for 6 weeks, no pushing or pulling with her arms for at least 4 weeks, sleeping in beach chair or recliner position  Patient is also interested in discussing panniculectomy and will schedule separate appointment for that  Otherwise follow-up as needed          Follow Up     No follow-ups on file.    Patient was given instructions and counseling regarding her condition. Please see specific information pulled into the AVS if appropriate.     Kerry Henley MD  04/17/2025

## 2025-04-17 ENCOUNTER — OFFICE VISIT (OUTPATIENT)
Dept: PLASTIC SURGERY | Facility: CLINIC | Age: 45
End: 2025-04-17
Payer: MEDICAID

## 2025-04-17 VITALS
BODY MASS INDEX: 28.79 KG/M2 | TEMPERATURE: 98.6 F | WEIGHT: 183.4 LBS | HEART RATE: 65 BPM | HEIGHT: 67 IN | OXYGEN SATURATION: 96 % | DIASTOLIC BLOOD PRESSURE: 72 MMHG | SYSTOLIC BLOOD PRESSURE: 121 MMHG

## 2025-04-17 DIAGNOSIS — L98.7 EXCESSIVE AND REDUNDANT SKIN AND SUBCUTANEOUS TISSUE: ICD-10-CM

## 2025-04-17 DIAGNOSIS — N64.81 PTOSIS OF BREAST: ICD-10-CM

## 2025-04-17 DIAGNOSIS — N62 BREAST HYPERTROPHY IN FEMALE: Primary | ICD-10-CM

## 2025-04-17 DIAGNOSIS — R21 RASH: ICD-10-CM

## 2025-04-17 RX ORDER — SODIUM PHOSPHATE,MONO-DIBASIC 19G-7G/118
ENEMA (ML) RECTAL
COMMUNITY

## 2025-04-17 RX ORDER — PHENOL 1.4 %
600 AEROSOL, SPRAY (ML) MUCOUS MEMBRANE DAILY
COMMUNITY

## 2025-04-17 RX ORDER — GABAPENTIN 100 MG/1
CAPSULE ORAL
COMMUNITY
Start: 2025-03-20

## 2025-04-17 NOTE — LETTER
April 21, 2025     JEFFERSON Burton    Patient: Vanessa Giang   YOB: 1980   Date of Visit: 4/17/2025     Dear JEFFERSON Burton:       Thank you for referring Vanessa Giang to me for evaluation. Below are the relevant portions of my assessment and plan of care.    If you have questions, please do not hesitate to call me. I look forward to following Vanessa along with you.         Sincerely,        Kerry Henley MD        CC: No Recipients    Kerry Henley MD  04/21/25 1210  Addendum  =Chief Complaint  Consult (Breast reduction)    Subjective         History of Present Illness  Vanessa Giang is a 44 y.o. female with history of morbid obesity, history of Cushing's disease (pituitary adenoma), fibromyalgia, neuropathy and symptomatic macromastia who presents to [facility] as a consult and would like to discuss breast reduction.      Patient used to weigh as much as 275 lbs until she had pituitary tumor removed in 2022.  Since then she had approximately 100 pound weight loss. Her current weight is 183lbs.   In November 2024 she had significantly changed her diet and cut out sugar.  She has continued to lose approximately 5 pounds per month.  She does not have goal weight and has not changed her diet with a goal to lose weight.    Patient has had large breasts since her pregnancy and being morbidly obese in the past.     As a result of her longstanding macromastia she has had neck pain, back pain, shoulder grooving and recurrent rashes under her breasts.  She is using nystatin under her breast to help control her rashes. She denies current rashes under he breasts. She is not as active as she would like to be due to her large breasts. She feels rashes are worse since weight loss as her beasts are even more deflated.   She has been going to chiropractor for 26 years and has had back surgery. Went to physical therapy last year  "several times.    Her clothing doesn't fit well due to excess loose skin throughout her body.     Her current bra size 42 F and she would like to be \"something smaller\". She denies personal history of breast pathology. No immediate family history of breast or ovarian cancer. Maternal cousin with breast cancer.    She has been compliant with her yearly mammograms and most recent mammogram on 10/18/24 showed benign results with Birads I (Normal). Patient denies any concerning masses, skin retraction or nipple drainage. She has never had breast biopsy.  She feels her breast are asymmetrical. She has normal present sensation in bilateral nipples.       Patient has a history of 1 pregnancies and 1 of births. She breast fed for 1 child for  16months    She had hysterectomy in the past. She is not on any blood thinners. She has neuropathy and is on gabapentin. She has multiple allergies.     Patient has good support system and her  will take care of her postoperatively.      Patient is taking the following over the counter supplements:  MSM, magnesium complex, blackberry extract, glucosamine and collagen, quercetin, fish oil, Ca, Vit C, multivitamin.    Patient denies chest pain or shortness of breath, no nausea vomiting.  No peripheral edema.  No unintentional weight loss or chills.  Patient denies personal or family history of excessive bleeding or blood clots, no adverse anesthesia reaction.  No recent illnesses.         Allergies: Cocamidopropyl betaine, Linalool, Nickel, Fd&c blue #2 (indigotine), Adhesive tape, Avocado, Bee pollen, Benzalkonium, Benzalkonium chloride, Lupton, Lupton red [red dye #40 (allura red)], Formaldehyde, Gold, Latex, Lavender oil, Other, Propolis, Quaternium-15, and Tea tree oil  Allergies Reconciled.    Review of Systems  All system were reviewed and were negative, except the ones noted above.     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    Developed post surgery After tumor was removed from pituitary gland   • Heart murmur    • Hernia     Hiatal hernia   • Hyperlipidemia    • Iron deficiency anemia secondary to inadequate dietary iron intake 03/01/2023   • Lactose intolerance    • Liver disease     [place]   • Sleep apnea     no longer present      Past Surgical History:   Procedure Laterality Date   • ABDOMINAL SURGERY      Endometrial ablation   • ANKLE SURGERY Right 2017   • COLONOSCOPY N/A 01/26/2024    Procedure: COLONOSCOPY TO CECUM WITH POLYPECTOMY;  Surgeon: Ida Stevens MD;  Location: [facility] MAIN OR;  Service: Gastroenterology;  Laterality: N/A;  Hemorrhoids, polyp   • ENDOSCOPY  2016    Esophageal ablation   • ENDOSCOPY N/A 08/31/2022    Procedure: ESOPHAGOGASTRODUODENOSCOPY WITH BX'S;  Surgeon: Ida Stevens MD;  Location: [facility] ENDOSCOPY;  Service: Gastroenterology;  Laterality: N/A;  pre: LUQ PAIN, HEARTBURN  post: IRREGULAR Z-LINE, ESOPHAGITIS   • HYSTERECTOMY     • TRANSPHENOIDAL / TRANSNASAL HYPOPHYSECTOMY / RESECTION PITUITARY TUMOR  04/2022   • UPPER GASTROINTESTINAL ENDOSCOPY        Family History   Problem Relation Age of Onset   • Alcohol abuse Mother    • Hyperlipidemia Mother    • Hypertension Mother    • Alcohol abuse Father    • Colon cancer Maternal Grandfather    • Arrhythmia Maternal Grandfather    • Hyperlipidemia Maternal Grandfather    • Cancer Paternal Grandfather 70        colon cancer   • Heart disease Maternal Grandmother    • Malig Hyperthermia Neg Hx       No family history of blood clots or excessive bleeding.  No history of adverse anesthesia reactions.           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     • calcium carbonate (OS-GISELA) 600 MG tablet Take 1 tablet by mouth Daily.     • cholecalciferol (VITAMIN D3) 1.25 MG (17247 UT) capsule Take  by mouth.     • clobetasol (TEMOVATE) 0.05 % ointment      • fluocinonide (LIDEX) 0.05 % external solution      • gabapentin (NEURONTIN) 100 MG capsule      • glucosamine sulfate 500 MG capsule capsule Take  by mouth 3 (Three) Times a Day With Meals.     • Magnesium 400 MG capsule      • Methylsulfonylmethane (MSM PO) Take  by mouth.     • Multiple Vitamins-Minerals (b complex-C-E-zinc) tablet Take 1 tablet by mouth Daily.     • Omega-3 Fatty Acids (fish oil) 1000 MG capsule capsule      • QUERCETIN PO Take  by mouth Daily.     • Unable to find 1 each 1 (One) Time. liposomal Vit C       No current facility-administered medications on file prior to visit.         Objective    /72 (BP Location: Right arm, Patient Position: Sitting, Cuff Size: Large Adult)   Pulse 65   Temp 98.6 °F (37 °C) (Temporal)   Ht 170.2 cm (67.01\")   Wt 83.2 kg (183 lb 6.4 oz)   SpO2 96%   BMI 28.72 kg/m²     Body mass index is 28.72 kg/m².    Physical Exam    General: pleasant woman in NAD  CV: RR, light murmur  Lungs: CTAB, no wheezing  Breast exam: Bilateral large breasts with grade 3 ptosis with breasts similar in size, right nipple-areolar complex is slightly smaller and in lower position compared to the left, loss of upper pole fullness, deflated breasts, overall reasonable symmetry.  Some moisture under both breast, but no active rash. Bilateral breast soft with no concerning masses, skin retraction or nipple " drainage.  Multiple stria. Bilateral lateral chest soft tissue rolls  SN-N: R 35 cm, L 34 cm  IMF-N: R 15 cm, L 17 cm   Breast width: R 17 cm, L 17 cm   N-N: 24 cm  NAC: R 8.5 x 5.5 cm, L 8.5 x 6.5 cm  Chest circumference at IMF level: 40.5  inches  Lymph: No cervical, supraclavicular or axillary lymphadenopathy  Abdomen: Obese, soft, nontender nondistended, large pannus, no palpable hernias, moisture but no rash under the pannus  Extremities: No peripheral edema, full range of motion  Psych: Appropriate mood and affect  Neuro: Grossly intact    Schnur Scale Score: 575  Body surface area is 1.95 meters squared.      Result Review:         Lab work on 11/25/2024: wnl including ACTH   Had a Daigle monitor in 2023   Had mild tricuspid and mitral regurgitation on echo in 2021   I do not see any recent CXR              Assessment and Plan      Diagnoses and all orders for this visit:    1. Breast hypertrophy in female (Primary)    2. Ptosis of breast    3. Excessive and redundant skin and subcutaneous tissue    4. Rash         Patient is a 44-year-old woman with history of morbid obesity, history of Cushing's disease due to pituitary adenoma, fibromyalgia, neuropathy and symptomatic macromastia who presents today for evaluation for bilateral breast reduction mammoplasty.  Mammogram is up-to-date.  She had hysterectomy in the past.  On physical exam patient has bilateral large breasts with grade 3 ptosis with reasonable symmetry.  No rashes under the breasts.   Both breasts are deflated with loss of upper pole fullness.  No concerning palpable masses, skin retraction or nipple drainage.  No concerning lymphadenopathy.    Patient would benefit from bilateral breast reduction mammoplasty.  Current BSA is 1.95 and I recommended 575 g of tissue to be removed from each side.  I discussed with the patient that while her breasts are fairly large her main issue is large skin envelope which is consistent with her history of massive  weight loss.  This is a very common presentation for patients with significant weight loss.  While it is possible to remove 575 g of tissue from each breast this will substantially decrease her breast size.  However she will not be up flat chested.  She states she does not mind being fairly small.  Size varies based on the company, but I would suspect she will have a full B to C cup.    Breast reduction should  at least significantly decrease the risk of recurrent rashes under her breasts.  Neck and back pain can be caused by a number of different factors including osteoarthritis, poor posture and others.  Hopefully with smaller breast this helps alleviate some of the symptoms.    Patient has fairly long distance from sternum to the nipple areolar complex. Discussed the use of SPY angiography during surgery. If there is any concern regarding nipple areolar complex perfusion at the time of surgery the recommendation is to remove the nipple areolar complex and transferred as a skin graft at the time of surgery.  This would leave nipple completely insensate.    I discussed the details and possible complications of the procedure including but not limited to bleeding, infection, seroma, scar, delayed wound healing, asymmetry, partial complete nipple loss, nipple sensation changes which is usually numbness, numbness is usually temporary but can be permanent, increased calcifications on mammogram and dog ears.       She was told she is at increased risk for dogears given the lateral chest soft tissue excess due to weight loss.     Also discussed the recommendation to wait 6 months after surgery to have the next mammogram.  Discussed the recommendation for weight stability for 6 months prior to any body contouring procedures.      Plan:  Preoperative photos obtained today   BSA 1.95 and I recommended removal of 575 g from each breast per Schnur scale  Patient understands this will potentially decrease her breast  size and states she does not mind having small breasts  Mammogram 10/18/2024, BI-RADS 1  Discussed recommendation for weight stability for 6 months prior to any body contouring procedures.  When talking about symptomatic macromastia, depending on severity of patient's symptoms it may be reasonable to proceed with surgery at this time as well as she understands that any significant weight loss again in the future may affect her long-term results  Another option is to wait till her weight stabilizes ±5 pounds  She does not have goal weight in mind and continue to lose approximately 5 pounds per month after she cut out sugar from her diet back in November  To proceed with surgery she will have to stop a number of her over-the-counter supplements some of them have blood thinning effects  Is because drain use, use of soft surgical bra, 6-week recovery period, no lifting more than 10 pounds for 6 weeks, no pushing or pulling with her arms for at least 4 weeks, sleeping in beach chair or recliner position  Patient is also interested in discussing panniculectomy and will schedule separate appointment for that  Otherwise follow-up as needed          Follow Up     No follow-ups on file.    Patient was given instructions and counseling regarding her condition. Please see specific information pulled into the AVS if appropriate.     Kerry Henley MD  04/17/2025

## 2025-04-21 ENCOUNTER — PREP FOR SURGERY (OUTPATIENT)
Dept: OTHER | Facility: HOSPITAL | Age: 45
End: 2025-04-21
Payer: MEDICAID

## 2025-04-21 ENCOUNTER — TELEPHONE (OUTPATIENT)
Dept: PLASTIC SURGERY | Facility: CLINIC | Age: 45
End: 2025-04-21
Payer: MEDICAID

## 2025-04-21 DIAGNOSIS — N62 BREAST HYPERTROPHY IN FEMALE: Primary | ICD-10-CM

## 2025-04-21 RX ORDER — SCOPOLAMINE 1 MG/3D
1 PATCH, EXTENDED RELEASE TRANSDERMAL CONTINUOUS
OUTPATIENT
Start: 2025-04-21 | End: 2025-04-24

## 2025-04-21 RX ORDER — ACETAMINOPHEN 500 MG
1000 TABLET ORAL
OUTPATIENT
Start: 2025-04-22 | End: 2025-04-23

## 2025-04-21 RX ORDER — SODIUM CHLORIDE 0.9 % (FLUSH) 0.9 %
10 SYRINGE (ML) INJECTION EVERY 12 HOURS SCHEDULED
OUTPATIENT
Start: 2025-04-21

## 2025-04-21 RX ORDER — ONDANSETRON 2 MG/ML
4 INJECTION INTRAMUSCULAR; INTRAVENOUS
OUTPATIENT
Start: 2025-04-22 | End: 2025-04-23

## 2025-04-21 RX ORDER — SODIUM CHLORIDE 9 MG/ML
40 INJECTION, SOLUTION INTRAVENOUS AS NEEDED
OUTPATIENT
Start: 2025-04-21

## 2025-04-21 RX ORDER — SODIUM CHLORIDE, SODIUM LACTATE, POTASSIUM CHLORIDE, CALCIUM CHLORIDE 600; 310; 30; 20 MG/100ML; MG/100ML; MG/100ML; MG/100ML
100 INJECTION, SOLUTION INTRAVENOUS CONTINUOUS
OUTPATIENT
Start: 2025-04-21 | End: 2025-04-21

## 2025-04-21 RX ORDER — SODIUM CHLORIDE 0.9 % (FLUSH) 0.9 %
10 SYRINGE (ML) INJECTION AS NEEDED
OUTPATIENT
Start: 2025-04-21

## 2025-04-21 RX ORDER — ACETAMINOPHEN 500 MG
1000 TABLET ORAL ONCE
OUTPATIENT
Start: 2025-04-21 | End: 2025-04-21

## 2025-04-21 NOTE — TELEPHONE ENCOUNTER
Provider: BEN    Caller: Vanessa Giang    Relationship to Patient: Self    Phone Number: [phone]    Reason for Call: PATIENT ASKS ABOUT NEXT STEPS NEEDED FOR BREAST REDUCTION. PLEASED CALL BACK    When was the patient last seen: 04/17/25

## 2025-04-21 NOTE — PROGRESS NOTES
Chief Complaint  Consult (panniculectomy) and Pre-op Exam (BBR)    Subjective               HPI     Patient is a 44 y.o. female with history of morbid obesity, history of Cushing's disease (pituitary adenoma), fibromyalgia, neuropathy, symptomatic macromastia status post transsphenoidal pituitary adenoma excision in 2022 and subsequent massive weight loss who presents today for evaluation for possible panniculectomy.    Patient was seen a week ago for evaluation for possible bilateral breast reduction.  Please see separate note for that.       Patient used to weigh as much as 275 lbs until she had pituitary tumor removed in 2022.  Since then she had approximately 100 pound weight loss. Her current weight is 183lbs. In November 2024 she had significantly changed her diet and cut out sugar.  She has continued to lose approximately 5 pounds per month.  She does not have goal weight and has not changed her diet with a goal to lose weight.     As a result of massive weight loss patient has excess skin throughout the body.  Patient is primarily bothered by excess skin in the abdomen with recurrent rashes under the pannus and umbilicus.  Patient denies active rash under the pannus at this time. She is following meticulous hygiene to help prevent her rashes.  She has not been treated with any antibiotics in the past.    Due to size of the pannus patient has difficulty with sexual activity, hygiene, exercise, bending down, difficulty finding appropriately fitting clothing. Patient denies abdominal pain or hernias. Patient denies nausea or vomiting.  Most recent hemoglobin A1c was 5.1 on 3/18/2025.      Patient has a history of 1 pregnancies and 1 live birth.     She had hysterectomy in the past. She is not on any blood thinners. She has neuropathy and is on gabapentin. She has multiple allergies.          Patient has never had DVT/PE in the past. Patient is currently not smoking, vaping, chewing tobacco, marijuana use.       Patient has good support system and her  will take care of her postoperatively.      Patient is taking the following over the counter supplements:  MSM, magnesium complex, blackberry extract, glucosamine and collagen, quercetin, fish oil, Ca, Vit C, multivitamin.      Patient denies chest pain or shortness of breath, no nausea vomiting.  No peripheral edema.  No unintentional weight loss or chills.  Patient denies personal or family history of excessive bleeding or blood clots, no adverse anesthesia reaction.  No recent illnesses.      Patient is also here for preoperative appointment for bilateral breast reduction mammoplasty.  She states she had considered her slow ongoing weight loss and the discussion we had at the initial consultation regarding the pros and cons of waiting till her weight stabilizes versus proceeding with surgery at this time.  She states that after talking to her , she feels that her large breasts have significant negative impact on her quality of life and she would like to proceed with surgery soon as possible.  She understands that any further significant weight loss or weight gain can affect her long-term results.  She says she has couple more questions about postoperative activity restrictions but otherwise has good understanding of the details and possible complications of the procedure.      She is accompanied to this visit by her .       History of Present Illness          Allergies: Cocamidopropyl betaine, Linalool, Nickel, Fd&c blue #2 (indigotine), Adhesive tape, Avocado, Bee pollen, Benzalkonium, Benzalkonium chloride, [place], [place] red [red dye #40 (allura red)], Formaldehyde, Gold, Latex, Lavender oil, Other, Propolis, Quaternium-15, and Tea tree oil  Allergies Reconciled.    Review of Systems  All system were reviewed and were negative, except the ones noted above.     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    Developed post surgery After tumor was removed from pituitary gland    Heart murmur     Hernia     Hiatal hernia    Hyperlipidemia     Iron deficiency anemia secondary to inadequate dietary iron intake 03/01/2023    Lactose intolerance     Liver disease     [place]af[place]    Sleep apnea     no longer present      Past Surgical History:   Procedure Laterality Date    ABDOMINAL SURGERY      Endometrial ablation    ANKLE SURGERY Right 2017    COLONOSCOPY N/A 01/26/2024    Procedure: COLONOSCOPY TO CECUM WITH POLYPECTOMY;  Surgeon: Ida Stevens MD;  Location: [facility] MAIN OR;  Service: Gastroenterology;  Laterality: N/A;  Hemorrhoids, polyp    ENDOSCOPY  2016    Esophageal ablation    ENDOSCOPY N/A 08/31/2022    Procedure: ESOPHAGOGASTRODUODENOSCOPY WITH BX'S;  Surgeon: Ida Stevens MD;  Location: [facility] ENDOSCOPY;  Service: Gastroenterology;  Laterality: N/A;  pre: LUQ PAIN, HEARTBURN  post: IRREGULAR Z-LINE, ESOPHAGITIS    HYSTERECTOMY      TRANSPHENOIDAL / TRANSNASAL HYPOPHYSECTOMY / RESECTION PITUITARY TUMOR  04/2022    UPPER GASTROINTESTINAL ENDOSCOPY        Family History   Problem Relation Age of Onset    Alcohol abuse Mother     Hyperlipidemia Mother     Hypertension Mother     Alcohol abuse Father     Colon cancer Maternal Grandfather     Arrhythmia Maternal Grandfather     Hyperlipidemia Maternal Grandfather     Cancer Paternal Grandfather 70        colon cancer    Heart disease Maternal Grandmother     Malig Hyperthermia Neg Hx        No family history of blood clots or excessive bleeding.  No history of adverse anesthesia reactions.        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      Physical Exam        Physical Exam        General: pleasant woman in NAD  CV: RR, light murmur  Lungs: CTAB, no wheezing  Breast exam: Bilateral large breasts with grade 3 ptosis with breasts similar in size, right nipple-areolar complex is slightly smaller and in lower position compared to the left, loss of upper pole fullness, deflated breasts, overall reasonable symmetry.  Some moisture under both breast, but no active rash. Bilateral breast soft with no concerning masses, skin retraction or nipple drainage.  Multiple stria. Bilateral lateral chest soft tissue rolls  SN-N: R 35 cm, L 34 cm  IMF-N: R 15 cm, L 17 cm   Breast width: R 17 cm, L 17 cm   N-N: 24 cm  NAC: R 8.5 x 5.5 cm, L 8.5 x 6.5 cm  Chest circumference at IMF level: 40.5  inches  Lymph: No cervical, supraclavicular or axillary lymphadenopathy  Abdomen: Soft, nontender, nondistended, excess skin above and below umbilicus with large pannus and mons ptosis, excess skin in vertical and horizontal direction, but more so in the vertical direction, extensive striae, no active rash under the pannus, but redness and moisture in the umbilicus, stretched out umbilical stalk covered in a skin fold, palpable diastases recti above umbilicus and abdominal fascial weakness below umbilicus, but no palpable hernias, no lateral flank soft tissue fullness and moderate excess skin  Measurements: at umbilicus level 44 inches, lower abdomen at the level of the pannus 47 inches  Extremities: No peripheral edema, excess skin in bilateral medial thighs and upper arms, no calf " tenderness, full range of motion  Psych: Appropriate mood and affect  Neuro: Grossly intact      Extremities: No peripheral edema, full range of motion  Psych: Appropriate mood and affect  Neuro: Grossly intact     Schnur Scale Score: 575  Body surface area is 1.95 meters squared.      Result Review :   The following data was reviewed by: Kerry Henley MD on 04/24/2025:    Lab work on 11/25/2024: wnl including ACTH   Had a Daigle monitor in 2023   Had mild tricuspid and mitral regurgitation on echo in 2021   I do not see any recent CXR        Procedures              Diagnoses and all orders for this visit:    1. Excessive and redundant skin and subcutaneous tissue (Primary)  -     Magnesium; Future  -     Phosphorus; Future    2. Rash  -     Magnesium; Future  -     Phosphorus; Future    3. Breast hypertrophy in female  -     Magnesium; Future  -     Phosphorus; Future        Assessment and plan (Abdomen) consultation:   Patient is a 45 yo woman with history of morbid obesity and pituitary adenoma s/p synovial pituitary adenoma excision in 2022 and subsequent massive weight loss who presents today for consultation for possible panniculectomy.  Patient has ongoing approximately 5 pounds per month weight loss.      On physical exam patient has excess skin above and below umbilicus with no active rash under her pannus, but has a rash in her umbilicus.  She has palpable diastases above umbilicus and lower abdominal wall weakness, but no palpable hernias. Umbilical stalk is very stretched out.    Patient has mons ptosis with excess skin and sub-q tissue.     Patient will benefit from panniculectomy to decrease the risk of recurrent rashes and improve her quality of life.  I discussed with the patient the difference between [id number] panniculectomy and lower transverse abdominal panniculectomy.      [id number]-[id number] panniculectomy allows for excision of excess skin above and below umbilicus in vertical and horizontal  direction.  The downside of this procedure is high risk of wound dehiscence at the T-junction.  Normally this does not require reoperation and is allowed to heal by secondary intention but healing can take several weeks to months.  It also results in increased scar burden with vertical and horizontal extent.    I discussed with her the details of lower transverse abdominal panniculectomy.  This approach only addresses excess skin and soft tissue below umbilicus and will not be a good operation for her as it will not address excess tissue above umbilicus.     If she would like to minimize her scar burden then I would recommend lower transverse abdominal panniculectomy with umbilical transposition and diastases repair.     This will allow to address excess skin above and below umbilicus.      Lower transverse abdominal panniculectomy approach benefit is that it does decrease the scar burden and once she is healed it should be hidden below her clothing.  I also stressed the importance that lower transverse abdominal panniculectomy also carries a risk of delayed wound healing and wound breakdown, but it is less so than [id number]-yo-[id number] panniculectomy.       The risks of both procedures include, but not limited to bleeding, seroma, infection, scar, sensation changes, damage to deeper structures, asymmetry, contour irregularities, partial or complete umbilical loss, delayed wound healing, dog ears and unsatisfactory results.     We also discussed 6 weeks of postoperative recovery.       Plan:     -- Abdominal photos obtained today  -- I do not think she needs any additional weight loss, a lot of her current weight is excess skin and subcu tissue after massive weight loss  -- Surgical options discussed above: [id number]-ls-huh panniculectomy versus lower transverse abdominal panniculectomy plus diastases repair and umbilical transposition and can consider bilateral flank liposuction  -- Discussed that if I encountered any hernias  at the time of panniculectomy I will plan to repair those at the same time.   -- Medical comorbidities need to be optimized at the time of surgery   -- The goal of hemoglobin would be in mid to upper level of normal.  There is always some blood loss during the operation but in addition to that a reasonably significant amount of blood is removed within the tissue that has been excised.  If liposuction is performed it also further increases blood loss.  -- She is taking multivitamin and number of other over the counter supplements. The management of the supplements will be similar to recs for breast reduction surgery   -- Nutrituional labs wnl now  -- We discussed the use of Lovenox for DVT prophylaxis.   --We discussed expected 6-week postoperative recovery period, walking hunched over for 2 weeks, sleeping in beach chair or recliner position for 6 weeks, compression garment drain use, tentative follow-up plan.  A more detailed discussion will need to be held at preoperative appointment        Assessment and plan (Breast):     Patient is a 44-year-old woman with history of morbid obesity, history of Cushing's disease (pituitary adenoma), fibromyalgia, neuropathy, symptomatic macromastia status post transsphenoidal pituitary adenoma excision in 2022 and subsequent massive weight loss who presents today for preoperative evaluation for bilateral breast reduction mammoplasty.  Mammogram is up-to-date.  She had hysterectomy in the past.  On physical exam patient has bilateral large breasts with grade 3 ptosis with reasonable symmetry.  No rashes under the breasts.   Both breasts are deflated with loss of upper pole fullness.  No concerning palpable masses, skin retraction or nipple drainage.  No concerning lymphadenopathy.     Patient would benefit from bilateral breast reduction mammoplasty.  Current BSA is 1.95 and I recommended 575 g of tissue to be removed from each side.  I discussed with the patient that while her  breasts are fairly large her main issue is large skin envelope which is consistent with her history of massive weight loss.  This is a very common presentation for patients with significant weight loss.  While it is possible to remove 575 g of tissue from each breast this will substantially decrease her breast size.  However, she will not be up flat chested.  She states she does not mind being fairly small.  Cup size varies based on the company, but I would suspect she will have a full B to C cup.     Breast reduction should significantly decrease the risk of recurrent rashes under her breasts.  Neck and back pain can be caused by a number of different factors including osteoarthritis, poor posture and others.  Hopefully, smaller breasts size will help alleviate some of the symptoms.     Patient has fairly long distance from sternum to the nipple areolar complex. I discussed the use of SPY angiography during surgery. If there is any concern regarding nipple areolar complex perfusion at the time of surgery the recommendation is to remove the nipple areolar complex and transferred as a skin graft at the time of surgery.  This would leave nipple completely insensate.     I discussed the details and possible complications of the procedure including but not limited to bleeding, infection, seroma, scar, delayed wound healing, asymmetry, partial complete nipple loss, nipple sensation changes which is usually numbness, numbness is usually temporary, but can be permanent, increased calcifications on mammogram and dog ears.      She was told she is at increased risk for dogears given the lateral chest soft tissue excess due to weight loss.      Also discussed the recommendation to wait 6 months after surgery to have the next mammogram.    At the last visit we had extensive discussion regarding recommendation for weight stability for 6 months prior to proceeding with body contouring procedures.  Specifically for breast  reduction mammoplasty the timing of surgery is often considered in light of severity of patient's symptomatology.    Patient's weight has not been stable and she has had ongoing approximately 5 pound per month weight loss since November of last year.    We had extensive discussion that if she was going to continue to lose 5 pounds per month for the next 6 months that would be 30 pound weight loss and this may significantly affect her breast size and shape.    However we carry weight differently and lose weight differently.  With further weight loss her breast size and shape may change and may stay the same.  There is no way for me to predict that.      Patient states that she is severely symptomatic from her large breasts and expresses understanding of our discussion.  She states she fully understands that any further weight loss again may affect her future breast size and shape and long-term results.  She would like to proceed with surgery at this time to help alleviate some of her symptoms and improve her quality of life.       Plan:    -- Patient is currently scheduled for bilateral breast reduction mammoplasty, SPY angiography and possible free nipple graft on May 12, 2025  -- ASPS consent form for bilateral breast reduction mammoplasty was signed today  -- Patient is aware we will need to sign hospital consent form for operation on day of surgery  Preoperative photos obtained at last visit  BSA 1.95 and I recommended removal of 575 g from each breast per Schnur scale  Chest circumference at IMF level: 40.5  inches  Patient understands this will substantially decrease her breast size and states she does not mind having small breasts  Mammogram 10/18/2024, BI-RADS 1  Patient was instructed to reach out to her cardiologist for preoperative clearance.  She is scheduled to see him at the end of May and I asked her to see if she can move up her appointment to be seen before surgery  I asked patient to reach out to  her endocrinologist as well for preoperative clearance.  She is seen regularly every 6 months but is not due to see them until June.  This is something they may be able to weigh in on through electronic medical records and she may not necessitate an actual appointment with them before surgery.  However I do need their clearance  Patient was told she can continue her vitamin D, calcium and gabapentin, she was instructed to stop all her other over-the-counter supplements.  Her main concern is stopping magnesium.  She takes a very high dose of magnesium and says she treats her leg cramps with it.  I will want to see what her magnesium level is on preoperative lab results but in general I think taking close 1000 mg of magnesium daily is too much  Patient was told that I placed orders for preoperative EKG, chest x-ray and lab work.  She recently had some lab work back in November 2024.    She says she recently had another set of labs.  I will see if I can obtain those records but I suspect that there is a number of labs that I do order that are not routinely checked by primary care provider.  She is aware of nicotine testing per protocol now and on day of surgery  We extensively went over the details of postoperative activity restrictions and she was given detailed written postop instructions today: drain use, use of soft surgical bra, 6-week recovery period, no lifting more than 10 pounds for 6 weeks, no pushing or pulling with her arms for at least 4 weeks, sleeping in beach chair or recliner position  She was instructed to purchase ABD pads to help pad her incisions in the postoperative period.  I demonstrated to her how to apply ABD pads along her IMF and lateral chest  She will have drains and we discussed that usually I am able to remove them at 1 to 2 weeks at postop follow-up  We also discussed that I recommend at least 3 months between surgeries.  States she does want to proceed with panniculectomy after she  heals from her breast reduction I recommend waiting 6 months.  This allows for 6-week postoperative recovery.  After breast reduction followed by 6-week period of normalcy.         I spent 65 minutes for panniculectomy consultation with the patient obtaining history, performing physical exam, reviewing pertinent history, discussing different surgical approaches and possible complications of each approach, discussing postoperative recovery and documenting this visit.     Remainder of the time was spent for preoperative appointment for bilateral breast reduction mammoplasty.  Assessment & Plan             Follow Up     No follow-ups on file.    Patient was given instructions and counseling regarding her condition. Please see specific information pulled into the AVS if appropriate.     Kerry Henley MD  04/24/2025

## 2025-04-21 NOTE — TELEPHONE ENCOUNTER
Called patient and advised Dr. Henley entered the surgery order and we are able to schedule surgery. Advised we have openings next week and in May. Patient prefers May.  Patient preferred 5/12/25. She asks about drains-advised per the MA's she could have drains at least 1 week or it could be longer it depend on the output. It is common the more active patient is she could have more fluid. We discussed the recovery restrictions-no physical/strenuous activity for 4-6 weeks-no lifting, bending, twisting, reaching, pulling, etc. Patient asks about getting in a pool? Advised as long as she has an open wound she will not be able to get in any body of water. Patient asks about driving advised as long as she is not on pain medication and she will need to be careful -like T-everett arms.    Patient is scheduled for panni consult and pre-op for breast reduction surgery on 4/24/25 at 1:45p.m. with Dr. Henley.

## 2025-04-24 ENCOUNTER — CONSULT (OUTPATIENT)
Dept: PLASTIC SURGERY | Facility: CLINIC | Age: 45
End: 2025-04-24
Payer: MEDICAID

## 2025-04-24 VITALS
WEIGHT: 181 LBS | OXYGEN SATURATION: 97 % | DIASTOLIC BLOOD PRESSURE: 73 MMHG | HEIGHT: 67 IN | TEMPERATURE: 98 F | BODY MASS INDEX: 28.41 KG/M2 | SYSTOLIC BLOOD PRESSURE: 118 MMHG | HEART RATE: 65 BPM

## 2025-04-24 DIAGNOSIS — N62 BREAST HYPERTROPHY IN FEMALE: ICD-10-CM

## 2025-04-24 DIAGNOSIS — R21 RASH: ICD-10-CM

## 2025-04-24 DIAGNOSIS — L98.7 EXCESSIVE AND REDUNDANT SKIN AND SUBCUTANEOUS TISSUE: Primary | ICD-10-CM

## 2025-04-24 RX ORDER — GABAPENTIN 300 MG/1
CAPSULE ORAL
COMMUNITY
Start: 2025-04-23

## 2025-04-24 NOTE — Clinical Note
April 24, 2025     JEFFERSON Burton    Patient: Vanessa Giang   YOB: 1980   Date of Visit: 4/24/2025     Dear JEFFERSON Burton:       Thank you for referring Vanessa Giang to me for evaluation. Below are the relevant portions of my assessment and plan of care.    If you have questions, please do not hesitate to call me. I look forward to following Vanessa along with you.         Sincerely,        Kerry Henley MD        CC: No Recipients  Kerry Henley MD  04/24/25 4417  Incomplete  Chief Complaint  Consult (panniculectomy) and Pre-op Exam (BBR)    Subjective    {Problem List  Visit Diagnosis   Encounters  Notes  Medications  Labs  Result Review Imaging  Media :23}          HPI     Patient is a 44 y.o. female with history of morbid obesity, history of Cushing's disease (pituitary adenoma), fibromyalgia, neuropathy, symptomatic macromastia status post transsphenoidal pituitary adenoma excision in 2022 and subsequent massive weight loss who presents today for evaluation for possible panniculectomy.    Patient was seen a week ago for evaluation for possible bilateral breast reduction.  Please see separate note for that.       Patient used to weigh as much as 275 lbs until she had pituitary tumor removed in 2022.  Since then she had approximately 100 pound weight loss. Her current weight is 183lbs. In November 2024 she had significantly changed her diet and cut out sugar.  She has continued to lose approximately 5 pounds per month.  She does not have goal weight and has not changed her diet with a goal to lose weight.     As a result of massive weight loss patient has excess skin throughout the body.  Patient is primarily bothered by excess skin in the abdomen with recurrent rashes under the pannus and umbilicus.  Patient denies active rash under the pannus at this time. She is following meticulous hygiene to help prevent her rashes.  She has not been treated with any  antibiotics in the past.    Due to size of the pannus patient has difficulty with sexual activity, hygiene, exercise, bending down, difficulty finding appropriately fitting clothing. Patient denies abdominal pain or hernias. Patient denies nausea or vomiting.  Most recent hemoglobin A1c was 5.1 on 3/18/2025.      Patient has a history of 1 pregnancies and 1 live birth.     She had hysterectomy in the past. She is not on any blood thinners. She has neuropathy and is on gabapentin. She has multiple allergies.          Patient has never had DVT/PE in the past. Patient is currently not smoking, vaping, chewing tobacco, marijuana use.      Patient has good support system and her  will take care of her postoperatively.      Patient is taking the following over the counter supplements:  MSM, magnesium complex, blackberry extract, glucosamine and collagen, quercetin, fish oil, Ca, Vit C, multivitamin.      Patient denies chest pain or shortness of breath, no nausea vomiting.  No peripheral edema.  No unintentional weight loss or chills.  Patient denies personal or family history of excessive bleeding or blood clots, no adverse anesthesia reaction.  No recent illnesses.      Patient is also here for preoperative appointment for bilateral breast reduction mammoplasty.  She states she had considered her slow ongoing weight loss and the discussion we had at the initial consultation regarding the pros and cons of waiting till her weight stabilizes versus proceeding with surgery at this time.  She states that after talking to her , she feels that her large breasts have significant negative impact on her quality of life and she would like to proceed with surgery soon as possible.  She understands that any further significant weight loss or weight gain can affect her long-term results.  She says she has couple more questions about postoperative activity restrictions but otherwise has good understanding of the details  and possible complications of the procedure.      She is accompanied to this visit by her .       History of Present Illness          Allergies: Cocamidopropyl betaine, Linalool, Nickel, Fd&c blue #2 (indigotine), Adhesive tape, Avocado, Bee pollen, Benzalkonium, Benzalkonium chloride, [place], [place] red [red dye #40 (allura red)], Formaldehyde, Gold, Latex, Lavender oil, Other, Propolis, Quaternium-15, and Tea tree oil  Allergies Reconciled.    Review of Systems  All system were reviewed and were negative, except the ones noted above.     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    Developed post surgery After tumor was removed from pituitary gland   • Heart murmur    • Hernia     Hiatal hernia   • Hyperlipidemia    • Iron deficiency anemia secondary to inadequate dietary iron intake 03/01/2023   • Lactose intolerance    • Liver disease     [place]   • Sleep apnea     no longer present      Past Surgical History:   Procedure Laterality Date   • ABDOMINAL SURGERY      Endometrial ablation   • ANKLE SURGERY Right 2017   • COLONOSCOPY N/A 01/26/2024    Procedure: COLONOSCOPY TO CECUM WITH POLYPECTOMY;  Surgeon: Ida Stevens MD;  Location: [facility] [place], [place] MAIN OR;  Service: Gastroenterology;  Laterality: N/A;  Hemorrhoids, polyp   • ENDOSCOPY  2016    Esophageal ablation   • ENDOSCOPY N/A 08/31/2022    Procedure: ESOPHAGOGASTRODUODENOSCOPY WITH BX'S;  Surgeon: Ida Stevens MD;  Location: [facility] ENDOSCOPY;  Service: Gastroenterology;  Laterality: N/A;  pre: LUQ PAIN, HEARTBURN  post: IRREGULAR Z-LINE, ESOPHAGITIS   • HYSTERECTOMY     • TRANSPHENOIDAL / TRANSNASAL HYPOPHYSECTOMY / RESECTION PITUITARY TUMOR  04/2022   • UPPER GASTROINTESTINAL ENDOSCOPY        Family History   Problem Relation  "Age of Onset   • Alcohol abuse Mother    • Hyperlipidemia Mother    • Hypertension Mother    • Alcohol abuse Father    • Colon cancer Maternal Grandfather    • Arrhythmia Maternal Grandfather    • Hyperlipidemia Maternal Grandfather    • Cancer Paternal Grandfather 70        colon cancer   • Heart disease Maternal Grandmother    • Malig Hyperthermia Neg Hx        No family history of blood clots or excessive bleeding.  No history of adverse anesthesia reactions.        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      Physical Exam        Physical Exam        General: pleasant woman in NAD  CV: RR, light murmur  Lungs: CTAB, no wheezing  Breast exam: Bilateral large breasts with grade 3 ptosis with breasts similar in size, right nipple-areolar complex is slightly smaller and in lower position compared to the left, loss of upper pole fullness, deflated breasts, overall reasonable symmetry.  Some moisture under both breast, but no active rash. Bilateral breast soft with no concerning masses, skin retraction or nipple drainage.  Multiple stria. Bilateral lateral chest soft tissue rolls  SN-N: R 35 cm, L 34 cm  IMF-N: R 15 cm, L 17 cm   Breast width: R 17 cm, L 17 cm   N-N: 24 cm  NAC: R 8.5 x 5.5 cm, L 8.5 x 6.5 cm  Chest circumference at IMF level: 40.5  inches  Lymph: No cervical, supraclavicular or axillary lymphadenopathy  Abdomen: Soft, nontender, nondistended, " excess skin above and below umbilicus with large pannus and mons ptosis, excess skin in vertical and horizontal direction, but more so in the vertical direction, extensive striae, no active rash under the pannus, but redness and moisture in the umbilicus, stretched out umbilical stalk covered in a skin fold, palpable diastases recti above umbilicus and abdominal fascial weakness below umbilicus, but no palpable hernias, no lateral flank soft tissue fullness and moderate excess skin  Measurements: at umbilicus level 44 inches, lower abdomen at the level of the pannus 47 inches  Extremities: No peripheral edema, excess skin in bilateral medial thighs and upper arms, no calf tenderness, full range of motion  Psych: Appropriate mood and affect  Neuro: Grossly intact      Extremities: No peripheral edema, full range of motion  Psych: Appropriate mood and affect  Neuro: Grossly intact     Schnur Scale Score: 575  Body surface area is 1.95 meters squared.      Result Review:{Labs  Result Review  Imaging  Med Tab  Media :23}   The following data was reviewed by: Kerry Henley MD on 04/24/2025:    Lab work on 11/25/2024: wnl including ACTH   Had a Daigle monitor in 2023   Had mild tricuspid and mitral regurgitation on echo in 2021   I do not see any recent CXR        Procedures         {CC Problem List  Visit Diagnosis  ROS  Review (Popup)  Health Maintenance  Quality  BestPractice  Medications  SmartSets  SnapShot Encounters  Media :23}     Diagnoses and all orders for this visit:    1. Excessive and redundant skin and subcutaneous tissue (Primary)  -     Magnesium; Future  -     Phosphorus; Future    2. Rash  -     Magnesium; Future  -     Phosphorus; Future    3. Breast hypertrophy in female  -     Magnesium; Future  -     Phosphorus; Future        Assessment and plan (Abdomen) consultation:   Patient is a 43 yo woman with history of morbid obesity and pituitary adenoma s/p synovial pituitary adenoma  excision in 2022 and subsequent massive weight loss who presents today for consultation for possible panniculectomy.  Patient has ongoing approximately 5 pounds per month weight loss.      On physical exam patient has excess skin above and below umbilicus with no active rash under her pannus, but has a rash in her umbilicus.  She has palpable diastases above umbilicus and lower abdominal wall weakness, but no palpable hernias. Umbilical stalk is very stretched out.    Patient has mons ptosis with excess skin and sub-q tissue.     Patient will benefit from panniculectomy to decrease the risk of recurrent rashes and improve her quality of life.  I discussed with the patient the difference between yauor-dx-dcc panniculectomy and lower transverse abdominal panniculectomy.      Wuivm-pe-gyz panniculectomy allows for excision of excess skin above and below umbilicus in vertical and horizontal direction.  The downside of this procedure is high risk of wound dehiscence at the T-junction.  Normally this does not require reoperation and is allowed to heal by secondary intention but healing can take several weeks to months.  It also results in increased scar burden with vertical and horizontal extent.    I discussed with her the details of lower transverse abdominal panniculectomy.  This approach only addresses excess skin and soft tissue below umbilicus and will not be a good operation for her as it will not address excess tissue above umbilicus.     If she would like to minimize her scar burden then I would recommend lower transverse abdominal panniculectomy with umbilical transposition and diastases repair.     This will allow to address excess skin above and below umbilicus.      Lower transverse abdominal panniculectomy approach benefit is that it does decrease the scar burden and once she is healed it should be hidden below her clothing.  I also stressed the importance that lower transverse abdominal panniculectomy also  carries a risk of delayed wound healing and wound breakdown, but it is less so than xxvhw-ua-pyc panniculectomy.       The risks of both procedures include, but not limited to bleeding, seroma, infection, scar, sensation changes, damage to deeper structures, asymmetry, contour irregularities, partial or complete umbilical loss, delayed wound healing, dog ears and unsatisfactory results.     We also discussed 6 weeks of postoperative recovery.       Plan:     -- Abdominal photos obtained today  -- I do not think she needs any additional weight loss, a lot of her current weight is excess skin and subcu tissue after massive weight loss  -- Surgical options discussed above: Axxki-ru-wva panniculectomy versus lower transverse abdominal panniculectomy plus diastases repair and umbilical transposition and can consider bilateral flank liposuction  -- Discussed that if I encountered any hernias at the time of panniculectomy I will plan to repair those at the same time.   -- Medical comorbidities need to be optimized at the time of surgery   -- The goal of hemoglobin would be in mid to upper level of normal.  There is always some blood loss during the operation but in addition to that a reasonably significant amount of blood is removed within the tissue that has been excised.  If liposuction is performed it also further increases blood loss.  -- She is taking multivitamin and number of other over the counter supplements. The management of the supplements will be similar to recs for breast reduction surgery   -- Nutrituional labs wnl now  -- We discussed the use of Lovenox for DVT prophylaxis.   --We discussed expected 6-week postoperative recovery period, walking hunched over for 2 weeks, sleeping in beach chair or recliner position for 6 weeks, compression garment drain use, tentative follow-up plan.  A more detailed discussion will need to be held at preoperative appointment        Assessment and plan (Breast):     Patient  is a 44-year-old woman with history of morbid obesity, history of Cushing's disease (pituitary adenoma), fibromyalgia, neuropathy, symptomatic macromastia status post transsphenoidal pituitary adenoma excision in 2022 and subsequent massive weight loss who presents today for preoperative evaluation for bilateral breast reduction mammoplasty.  Mammogram is up-to-date.  She had hysterectomy in the past.  On physical exam patient has bilateral large breasts with grade 3 ptosis with reasonable symmetry.  No rashes under the breasts.   Both breasts are deflated with loss of upper pole fullness.  No concerning palpable masses, skin retraction or nipple drainage.  No concerning lymphadenopathy.     Patient would benefit from bilateral breast reduction mammoplasty.  Current BSA is 1.95 and I recommended 575 g of tissue to be removed from each side.  I discussed with the patient that while her breasts are fairly large her main issue is large skin envelope which is consistent with her history of massive weight loss.  This is a very common presentation for patients with significant weight loss.  While it is possible to remove 575 g of tissue from each breast this will substantially decrease her breast size.  However, she will not be up flat chested.  She states she does not mind being fairly small.  Cup size varies based on the company, but I would suspect she will have a full B to C cup.     Breast reduction should significantly decrease the risk of recurrent rashes under her breasts.  Neck and back pain can be caused by a number of different factors including osteoarthritis, poor posture and others.  Hopefully, smaller breasts size will help alleviate some of the symptoms.     Patient has fairly long distance from sternum to the nipple areolar complex. I discussed the use of SPY angiography during surgery. If there is any concern regarding nipple areolar complex perfusion at the time of surgery the recommendation is to  remove the nipple areolar complex and transferred as a skin graft at the time of surgery.  This would leave nipple completely insensate.     I discussed the details and possible complications of the procedure including but not limited to bleeding, infection, seroma, scar, delayed wound healing, asymmetry, partial complete nipple loss, nipple sensation changes which is usually numbness, numbness is usually temporary, but can be permanent, increased calcifications on mammogram and dog ears.      She was told she is at increased risk for dogears given the lateral chest soft tissue excess due to weight loss.      Also discussed the recommendation to wait 6 months after surgery to have the next mammogram.    At the last visit we had extensive discussion regarding recommendation for weight stability for 6 months prior to proceeding with body contouring procedures.  Specifically for breast reduction mammoplasty the timing of surgery is often considered in light of severity of patient's symptomatology.    Patient's weight has not been stable and she has had ongoing approximately 5 pound per month weight loss since November of last year.    We had extensive discussion that if she was going to continue to lose 5 pounds per month for the next 6 months that would be 30 pound weight loss and this may significantly affect her breast size and shape.    However we carry weight differently and lose weight differently.  With further weight loss her breast size and shape may change and may stay the same.  There is no way for me to predict that.      Patient states that she is severely symptomatic from her large breasts and expresses understanding of our discussion.  She states she fully understands that any further weight loss again may affect her future breast size and shape and long-term results.  She would like to proceed with surgery at this time to help alleviate some of her symptoms and improve her quality of life.        Plan:    -- Patient is currently scheduled for bilateral breast reduction mammoplasty, SPY angiography and possible free nipple graft on May 12, 2025  -- [facility]PS consent form for bilateral breast reduction mammoplasty was signed today  -- Patient is aware we will need to sign hospital consent form for operation on day of surgery  Preoperative photos obtained at last visit  BSA 1.95 and I recommended removal of 575 g from each breast per Schnur scale  Chest circumference at IMF level: 40.5  inches  Patient understands this will substantially decrease her breast size and states she does not mind having small breasts  Mammogram 10/18/2024, BI-RADS 1  Patient was instructed to reach out to her cardiologist for preoperative clearance.  She is scheduled to see him at the end of May and I asked her to see if she can move up her appointment to be seen before surgery  I asked patient to reach out to her endocrinologist as well for preoperative clearance.  She is seen regularly every 6 months but is not due to see them until June.  This is something they may be able to weigh in on through electronic medical records and she may not necessitate an actual appointment with them before surgery.  However I do need their clearance  Patient was told she can continue her vitamin D, calcium and gabapentin, she was instructed to stop all her other over-the-counter supplements.  Her main concern is stopping magnesium.  She takes a very high dose of magnesium and says she treats her leg cramps with it.  I will want to see what her magnesium level is on preoperative lab results but in general I think taking close 1000 mg of magnesium daily is too much  Patient was told that I placed orders for preoperative EKG, chest x-ray and lab work.  She recently had some lab work back in November 2024.    She says she recently had another set of labs.  I will see if I can obtain those records but I suspect that there is a number of labs that I do order  that are not routinely checked by primary care provider.  She is aware of nicotine testing per protocol now and on day of surgery  We extensively went over the details of postoperative activity restrictions and she was given detailed written postop instructions today: drain use, use of soft surgical bra, 6-week recovery period, no lifting more than 10 pounds for 6 weeks, no pushing or pulling with her arms for at least 4 weeks, sleeping in beach chair or recliner position  She was instructed to purchase ABD pads to help pad her incisions in the postoperative period.  I demonstrated to her how to apply ABD pads along her IMF and lateral chest  She will have drains and we discussed that usually I am able to remove them at 1 to 2 weeks at postop follow-up  We also discussed that I recommend at least 3 months between surgeries.  States she does want to proceed with panniculectomy after she heals from her breast reduction I recommend waiting 6 months.  This allows for 6-week postoperative recovery.  After breast reduction followed by 6-week period of normalcy.         I spent 65 minutes for panniculectomy consultation with the patient obtaining history, performing physical exam, reviewing pertinent history, discussing different surgical approaches and possible complications of each approach, discussing postoperative recovery and documenting this visit.     Remainder of the time was spent for preoperative appointment for bilateral breast reduction mammoplasty.  Assessment & Plan             Follow Up {Instructions Charge Capture  Follow-up Communications :23}    No follow-ups on file.    Patient was given instructions and counseling regarding her condition. Please see specific information pulled into the AVS if appropriate.     Kerry Henley MD  04/24/2025

## 2025-04-29 ENCOUNTER — TELEPHONE (OUTPATIENT)
Dept: PLASTIC SURGERY | Facility: CLINIC | Age: 45
End: 2025-04-29
Payer: MEDICAID

## 2025-04-29 NOTE — TELEPHONE ENCOUNTER
Hub staff attempted to follow warm transfer process and was unsuccessful     Caller: Vanessa Giang    Relationship to patient: Self    Best call back number: [phone]  PT CAN BE REACHED AT ANYTIME, IF NO ANSWER A DETAILED MSG CAN BE LEFT.     Patient is needing: PT IS CALLING TO R/S HER S/P PROCEDURE FOR JUNE. SHE IS ALSO ASKING FOR A CARDIO CLEARANCE FORM TO BE FAXED TO # [phone]. IF THERE'S ANY QUESTIONS FOR PT'S CARDIOLOGIST THE PHONE # [phone].

## 2025-04-30 NOTE — TELEPHONE ENCOUNTER
Patient returned call to reschedule surgery. Surgery has been rescheduled for 6/23/25. Pre-op has been scheduled for 6/3/25 at 1:00p.m.    ____________________________    Also advised patient we have a partially cosmetic quote available from her consult last week.Estimate #[id number]. We discussed the deposit to schedule and remaining fees would be due at the pre-op visit 3 weeks before the surgery date. Additional costs would be related to the insurance portion-panniculectomy. These fees would be based on deductible/out of pocket costs if insurance approves. Patient asks if insurance were to deny and she cancels surgery can she get the deposit back? Advised as long as there are no outstanding bills with any [facility] she would be eligible for a full refund. If she has any bills at any [facility] office they would pay these bills off and refund the remaining. Patient asks for a fully cosmetic quote if insurance were to deny. Discussed fully cosmetic quote. Estimate #[id number].These quotes are valid for 6 months.     Patient states Dr. Henley mentioned she would have to wait at least 3 months from the first surgery. So she would be eligible to schedule surgery at the end of September 2025.

## 2025-05-01 ENCOUNTER — TRANSCRIBE ORDERS (OUTPATIENT)
Dept: ADMINISTRATIVE | Facility: HOSPITAL | Age: 45
End: 2025-05-01
Payer: MEDICAID

## 2025-05-01 DIAGNOSIS — M81.0 OSTEOPOROSIS, UNSPECIFIED OSTEOPOROSIS TYPE, UNSPECIFIED PATHOLOGICAL FRACTURE PRESENCE: Primary | ICD-10-CM

## 2025-05-01 DIAGNOSIS — M81.0 SENILE OSTEOPOROSIS: Primary | ICD-10-CM

## 2025-05-12 ENCOUNTER — HOSPITAL ENCOUNTER (OUTPATIENT)
Dept: BONE DENSITY | Facility: HOSPITAL | Age: 45
Discharge: HOME | End: 2025-05-12
Admitting: NURSE PRACTITIONER
Payer: MEDICAID

## 2025-05-12 DIAGNOSIS — M81.0 OSTEOPOROSIS, UNSPECIFIED OSTEOPOROSIS TYPE, UNSPECIFIED PATHOLOGICAL FRACTURE PRESENCE: ICD-10-CM

## 2025-05-12 PROCEDURE — 77080 DXA BONE DENSITY AXIAL: CPT

## 2025-05-21 NOTE — H&P (VIEW-ONLY)
=Chief Complaint  Pre-op Exam (Pre op)    Subjective          History of Present Illness  Vanessa Giang is a 44 y.o. female with history of morbid obesity, history of Cushing's disease (pituitary adenoma), fibromyalgia, neuropathy and symptomatic macromastia who presents to [facility]   Patient is here for preoperative appointment for bilateral breast reduction mammoplasty.  She states since last visit her weight had remained stable.   She understands that any further significant weight loss or weight gain can affect her long-term results.  She says she has good understanding of the details and possible complications of the procedure. She feels that her large breasts have significant negative impact on her quality of life and she would like to proceed with surgery.   She was previously scheduled for surgery last month but elected to post-pond till end of June.     She was seen by cardiology on 05/22/2025. She has mild heart palpitations at times, but otherwise asymptomatic and hormonal lab work returned wnl. She has high cholesterol. She was told she can follow up with cardiology on as needed basis.         She denies any changes in her health. She is accompanied to this visit by her .     Prior history:     Patient used to weigh as much as 275 lbs until she had pituitary tumor removed in 2022.  Since then she had approximately 100 pound weight loss. Her current weight is 183lbs.   In November 2024 she had significantly changed her diet and cut out sugar.  She has continued to lose approximately 5 pounds per month.  She does not have goal weight and has not changed her diet with a goal to lose weight.    Patient has had large breasts since her pregnancy and being morbidly obese in the past.     As a result of her longstanding macromastia she has had neck pain, back pain, shoulder grooving and recurrent rashes under her breasts.  She is using nystatin under  "her breast to help control her rashes. She denies current rashes under he breasts. She is not as active as she would like to be due to her large breasts. She feels rashes are worse since weight loss as her beasts are even more deflated.   She has been going to chiropractor for 26 years and has had back surgery. Went to physical therapy last year several times.    Her clothing doesn't fit well due to excess loose skin throughout her body.     Her current bra size 42 F and she would like to be \"something smaller\". She denies personal history of breast pathology. No immediate family history of breast or ovarian cancer. Maternal cousin with breast cancer.    She has been compliant with her yearly mammograms and most recent mammogram on 10/18/24 showed benign results with Birads I (Normal). Patient denies any concerning masses, skin retraction or nipple drainage. She has never had breast biopsy.  She feels her breast are asymmetrical. She has normal present sensation in bilateral nipples.       Patient has a history of 1 pregnancies and 1 of births. She breast fed for 1 child for 16months    She had hysterectomy in the past. She is not on any blood thinners. She has neuropathy and is on gabapentin. She has multiple allergies.     Patient has good support system and her  will take care of her postoperatively.      Patient is taking the following over the counter supplements:  MSM, magnesium complex, blackberry extract, glucosamine and collagen, quercetin, fish oil, Ca, Vit C, multivitamin.    Patient denies chest pain or shortness of breath, no nausea vomiting.  No peripheral edema.  No unintentional weight loss or chills.  Patient denies personal or family history of excessive bleeding or blood clots, no adverse anesthesia reaction.  No recent illnesses.     History of Present Illness          Allergies: Cocamidopropyl betaine, Linalool, Nickel, Blue dye #2 (indigo carmine), Adhesive tape, Avocado, Bee pollen, " Benzalkonium, Benzalkonium chloride, Critz, Critz red [red dye #40 (allura red)], Formaldehyde, Gold, Latex, Lavender oil, Other, Propolis, Quaternium-15, and Tea tree oil  Allergies Reconciled.    Review of Systems  All system were reviewed and were negative, except the ones noted above.     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    Developed post surgery After tumor was removed from pituitary gland    Heart murmur     Hernia     Hiatal hernia    Hyperlipidemia     Iron deficiency anemia secondary to inadequate dietary iron intake 03/01/2023    Lactose intolerance     Liver disease     [place]afld    Sleep apnea     no longer present      Past Surgical History:   Procedure Laterality Date    ABDOMINAL SURGERY      Endometrial ablation    ANKLE SURGERY Right 2017    COLONOSCOPY N/A 01/26/2024    Procedure: COLONOSCOPY TO CECUM WITH POLYPECTOMY;  Surgeon: Ida Stevens MD;  Location: [facility] OR;  Service: Gastroenterology;  Laterality: N/A;  Hemorrhoids, polyp    ENDOSCOPY  2016    Esophageal ablation    ENDOSCOPY N/A 08/31/2022    Procedure: ESOPHAGOGASTRODUODENOSCOPY WITH BX'S;  Surgeon: Ida Stevens MD;  Location: [facility] ENDOSCOPY;  Service: Gastroenterology;  Laterality: N/A;  pre: LUQ PAIN, HEARTBURN  post: IRREGULAR Z-LINE, ESOPHAGITIS    HYSTERECTOMY      TRANSPHENOIDAL / TRANSNASAL HYPOPHYSECTOMY / RESECTION PITUITARY TUMOR  04/2022    UPPER GASTROINTESTINAL ENDOSCOPY        Family History   Problem Relation Age of Onset    Alcohol abuse Mother     Hyperlipidemia Mother     Hypertension Mother     Alcohol abuse Father     Colon cancer Maternal Grandfather     Arrhythmia Maternal Grandfather     Hyperlipidemia Maternal Grandfather     Cancer Paternal Grandfather 70        colon cancer     Heart disease Maternal Grandmother     Karen Hyperthermia Neg Hx       No family history of blood clots or excessive bleeding.  No history of adverse anesthesia reactions.           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      gabapentin (NEURONTIN) 100 MG capsule       gabapentin (NEURONTIN) 300 MG capsule       glucosamine sulfate 500 MG capsule capsule Take  by mouth 3 (Three) Times a Day With Meals.      Magnesium 400 MG capsule       MAGNESIUM PO       Magnesium-Potassium 250-100 MG tablet       Multiple Vitamins-Minerals (b complex-C-E-zinc) tablet Take 1 tablet by mouth Daily.      Omega-3 Fatty Acids (fish oil) 1000 MG capsule capsule       QUERCETIN PO Take  by mouth Daily.      SELENIUM PO       Black Elderberry 50 MG/5ML syrup Take  by mouth. (Patient not taking: Reported on 6/3/2025)      clobetasol (TEMOVATE) 0.05 % ointment  (Patient not taking: Reported on 6/3/2025)      fluocinonide (LIDEX) 0.05 % external solution  (Patient not taking: Reported on 6/3/2025)      Unable to find 1 each 1 (One) Time. liposomal Vit C      [DISCONTINUED] cholecalciferol (VITAMIN D3) 1.25 MG (08350 UT) capsule Take  by mouth.      [DISCONTINUED] Cyanocobalamin (B-12 COMPLIANCE INJECTION IJ) as directed Once a month      [DISCONTINUED] esomeprazole (nexIUM) 40 MG capsule 1 capsule Daily.      [DISCONTINUED] ferrous sulfate 325 (65 Fe) MG tablet Take 1 tablet by mouth Daily.      [DISCONTINUED] Methylsulfonylmethane (MSM  "PO) Take  by mouth.       No current facility-administered medications on file prior to visit.         Objective     /79 (BP Location: Left arm, Patient Position: Sitting, Cuff Size: Adult)   Pulse 57   Temp 96.8 °F (36 °C) (Temporal)   Ht 170.2 cm (67.01\")   Wt 82.6 kg (182 lb)   SpO2 98%   BMI 28.50 kg/m²     Body mass index is 28.5 kg/m².    Physical Exam    Physical Exam          General: pleasant woman in NAD  CV: RR, light murmur  Lungs: CTAB, no wheezing  Breast exam: Bilateral large breasts with grade 3 ptosis with breasts similar in size, right nipple-areolar complex is slightly smaller and in lower position compared to the left, loss of upper pole fullness, deflated breasts, overall reasonable symmetry.  Some moisture under both breast, but no active rash. Bilateral breast soft with no concerning masses, skin retraction or nipple drainage.  Multiple stria. Bilateral lateral chest soft tissue rolls  SN-N: R 35 cm, L 34 cm  IMF-N: R 15 cm, L 17 cm   Breast width: R 17 cm, L 17 cm   N-N: 24 cm  NAC: R 8.5 x 5.5 cm, L 8.5 x 6.5 cm  Chest circumference at IMF level: 40.5  inches  Lymph: No cervical, supraclavicular or axillary lymphadenopathy  Abdomen: Obese, soft, nontender nondistended, large pannus, no palpable hernias, moisture but no rash under the pannus  Extremities: No peripheral edema, full range of motion  Psych: Appropriate mood and affect  Neuro: Grossly intact    Schnur Scale Score: 575  Body surface area is 1.94 meters squared.      Result Review :     CXR 05/22/2025:      FINDINGS:  Two (2) views of the chest (PA and lateral upright projections) are  provided for review. No cardiac enlargement is seen. No acute infiltrate  is appreciated. Lung expansion is within normal limits. No mediastinal  shift. The imaged airway is patent. No pleural effusion or pneumothorax  is identified. No obvious acute osseous abnormality is suggested. There  are suspected mild degenerative changes of the " right shoulder and the  imaged spine. There is a suspected nonspecific stent projected over the  abdomen at about the L3 level on the lateral view, which is not clearly  localized on the frontal projection.        IMPRESSION:  No acute infiltrate is appreciated. No cardiac enlargement.        Latest Reference Range & Units 05/22/25 16:44   Sodium 136 - 145 mmol/L 138   Potassium 3.5 - 5.2 mmol/L 3.9   Chloride 98 - 107 mmol/L 102   CO2 22.0 - 29.0 mmol/L 26.0   Anion Gap 5.0 - 15.0 mmol/L 10.0   BUN 6 - 20 mg/dL 10   Creatinine 0.57 - 1.00 mg/dL 0.73   BUN/Creatinine Ratio 7.0 - 25.0  13.7   eGFR >60.0 mL/min/1.73 102.9   Glucose 65 - 99 mg/dL 84   Calcium 8.6 - 10.5 mg/dL  8.6 - 10.5 mg/dL 9.7  9.6   Magnesium 1.6 - 2.6 mg/dL 2.1   Phosphorus 2.5 - 4.5 mg/dL 3.9   Alkaline Phosphatase 39 - 117 U/L 57   Total Protein 6.0 - 8.5 g/dL 6.6   Albumin 3.5 - 5.2 g/dL 4.2   Globulin gm/dL 2.4   A/G Ratio g/dL 1.8   AST (SGOT) 1 - 32 U/L 24   ALT (SGPT) 1 - 33 U/L 18   Total Bilirubin 0.0 - 1.2 mg/dL 0.3   PTH Intact (Serial Monitor) 15.0 - 65.0 pg/mL 20.4   Protime 11.8 - 14.9 Seconds 13.5   INR 0.86 - 1.15  0.99   PTT 24.2 - 34.2 seconds 29.4   WBC 3.40 - 10.80 10*3/mm3 8.94   RBC 3.77 - 5.28 10*6/mm3 4.00   Hemoglobin 12.0 - 15.9 g/dL 12.5   Hematocrit 34.0 - 46.6 % 38.2   Platelets 140 - 450 10*3/mm3 344   RDW 12.3 - 15.4 % 11.8 (L)   MCV 79.0 - 97.0 fL 95.5   MCH 26.6 - 33.0 pg 31.3   MCHC 31.5 - 35.7 g/dL 32.7   MPV 6.0 - 12.0 fL 10.6   RDW-SD 37.0 - 54.0 fl 40.8   Neutrophil Rel % 42.7 - 76.0 % 71.3   Lymphocyte Rel % 19.6 - 45.3 % 21.6   Monocyte Rel % 5.0 - 12.0 % 4.5 (L)   Eosinophil Rel % 0.3 - 6.2 % 1.8   Basophil Rel % 0.0 - 1.5 % 0.6   Immature Granulocyte Rel % 0.0 - 0.5 % 0.2   Neutrophils Absolute 1.70 - 7.00 10*3/mm3 6.38   Lymphocytes Absolute 0.70 - 3.10 10*3/mm3 1.93   Monocytes Absolute 0.10 - 0.90 10*3/mm3 0.40   Eosinophils Absolute 0.00 - 0.40 10*3/mm3 0.16   Basophils Absolute 0.00 - 0.20 10*3/mm3  0.05   Immature Grans, Absolute 0.00 - 0.05 10*3/mm3 0.02   nRBC 0.0 - 0.2 /100 WBC 0.0   (L): Data is abnormally low  Had a Daigle monitor in 2023   Had mild tricuspid and mitral regurgitation on echo in 2021   I do not see any recent CXR              Assessment and Plan      Assessment & Plan          Diagnoses and all orders for this visit:    1. Breast hypertrophy in female (Primary)    2. Excessive and redundant skin and subcutaneous tissue    3. Rash    4. Ptosis of breast          Assessment and plan (Breast):      Patient is a 44-year-old woman with history of morbid obesity, history of Cushing's disease (pituitary adenoma), fibromyalgia, neuropathy, symptomatic macromastia status post transsphenoidal pituitary adenoma excision in 2022 and subsequent massive weight loss who presents today for preoperative evaluation for bilateral breast reduction mammoplasty.  Mammogram is up-to-date.  She had hysterectomy in the past.  On physical exam patient has bilateral large breasts with grade 3 ptosis with reasonable symmetry.  No rashes under the breasts.   Both breasts are deflated with loss of upper pole fullness.  No concerning palpable masses, skin retraction or nipple drainage.  No concerning lymphadenopathy.     Patient would benefit from bilateral breast reduction mammoplasty.  Current BSA is 1.95 and I recommended 575 g of tissue to be removed from each side.  I discussed with the patient that while her breasts are fairly large her main issue is large skin envelope which is consistent with her history of massive weight loss.  This is a very common presentation for patients with significant weight loss.  While it is possible to remove 575 g of tissue from each breast this will substantially decrease her breast size.  However, she will not be up flat chested.  She states she does not mind being fairly small.  Cup size varies based on the company, but I would suspect she will have a full B to C cup.     Breast  reduction should significantly decrease the risk of recurrent rashes under her breasts.  Neck and back pain can be caused by a number of different factors including osteoarthritis, poor posture and others.  Hopefully, smaller breasts size will help alleviate some of the symptoms.     Patient has fairly long distance from sternum to the nipple areolar complex. I discussed the use of SPY angiography during surgery. If there is any concern regarding nipple areolar complex perfusion at the time of surgery the recommendation is to remove the nipple areolar complex and transferred as a skin graft at the time of surgery.  This would leave nipple completely insensate.    Today, I discussed with the patient possible mesh use at the time of   I discussed with the patient that given presence of grade 3 ptosis, very thin and poor skin quality with multiple stria and long distance from ST-N I recommend using inferior pedicle and reinforce the lower pole with mesh to help prevent pseudoptosis occurrence and have longer lasting results.    -- Patient's mother in law  had abdominal hernia repair with synthetic mesh, complicated by wound breakdown and several years non-healing wound. Patient was therefore very concerned about the mesh use.   I explained to her that the type of mesh used for her breast reduction is very different than the synthetic mesh used for abdominal wall reconstruction. Acellular dermal matrix is a biologic mesh and is more resistant to infection compared to certain types of synthetic mesh used for abd recon.   There are several synthetic meshes available for use in breast as well, but are also associated with very low risk of infection and generally resorbed by the body over the course of several months to 1-2 years.   I would plan to use an ADM for her breat reduction. I think this would be very beneficial in her case especially. However, I also understand she has some reservation based on the experience her  mother in law had. Hopefully the information I provided today will help reassure her.   I also reiterated that there is always a risk of infection with any surgery (whether mesh is used or not), but a number of steps are taken to help decrease the risk of this possible complication. Ultimately it is up to her if the mesh is used or not. She will think about it and let me know in the next few days to a week.         -- She was told she is at increased risk for dogears given the lateral chest soft tissue excess due to weight loss.      She is concerned about dog ears and we again discussed hat she has excess skin from massive weight loss and lateral chest excess skin wraps around her back. I will go as far lateral as I can to decrease the risk of dog ears but it is still possible.   One option is to consider upper back lift with excess skin excision and this can be done at the time of breast reduction. However it is considered a cosmetic procedure and will not be covered by insurance. She expressed understanding and wants to proceed with breast reduction only.      I discussed the details and possible complications of the procedure including but not limited to bleeding, infection, seroma, scar, delayed wound healing, asymmetry, partial complete nipple loss, nipple sensation changes which is usually numbness, numbness is usually temporary, but can be permanent, increased calcifications on mammogram and dog ears.        Also discussed the recommendation to wait 6 months after surgery to have the next mammogram.     At initial consultation we had extensive discussion regarding recommendation for weight stability for 6 months prior to proceeding with body contouring procedures.  Specifically for breast reduction mammoplasty the timing of surgery is often considered in light of severity of patient's symptomatology.     Patient's weight has been stable for the lat couple of months, but prior ot it she had ongoing  approximately 5 pound per month weight loss since November of last year.     We had extensive discussion that if she was going to continue to lose 5 pounds per month for the next 6 months that would be 30 pound weight loss and this may significantly affect her breast size and shape.     However we carry weight differently and lose weight differently.  With further weight loss her breast size and shape may change or may stay the same, but likely decrease.  There is no way for me to predict that.     Patient states that she is severely symptomatic from her large breasts and expresses understanding of our discussion.  She states she fully understands that any further weight loss again may affect her future breast size and shape and long-term results.  She would like to proceed with surgery at this time to help alleviate some of her symptoms and improve her quality of life.        Plan:     -- Patient is currently scheduled for bilateral breast reduction mammoplasty, SPY angiography and possible free nipple graft on 06/23/ 2025. She will think about possible mesh use and let me know in few days to a week.   -- [facility] consent form for bilateral breast reduction mammoplasty was signed today  -- Patient is aware we will need to sign hospital consent form for operation on day of surgery  Preoperative photos obtained at initial visit   BSA 1.95 and I recommended removal of 575 g from each breast per Schnur scale  Chest circumference at IMF level: 40.5  inches  Patient understands this will substantially decrease her breast size and states she does not mind having small breasts  Mammogram 10/18/2024, BI-RADS 1, she is aware recommendation to wait 6 months after surgery before next mammogram   Patient was instructed to reach out to her cardiologist for preoperative clearance.  She was seen by cardiology on 05/22/2025 and she was cleared to proceed with surgery. She was also instructed to f/u with them as needed   I asked patient  to reach out to her endocrinologist as well for preoperative clearance.  She is seen regularly every 6 months but is not due to see them until June.  This is something they may be able to weigh in on through electronic medical records and she may not necessitate an actual appointment with them before surgery.  However I do need their clearance  Patient was told she can continue her vitamin D, calcium and gabapentin, she was instructed to stop all her other over-the-counter supplements.  Her main concern is stopping magnesium.  She takes a very high dose of magnesium and says she treats her leg cramps with it.  Magnesium level on preoperative lab results on 05/22/2025 was 2.1 (wnl). In general, 1000 mg of magnesium daily is jaylin high dose. I asked patient she decrease the dose of Magnesium couple day before surgery   Pre-op CXR is done and wnl, but EKG still pending. She was asked to obtain it ASAP.   Recent endocrine labs on 05/22/2025 all wnl.   She is aware of nicotine testing per protocol now and on day of surgery  Pain control: patient was instructed to take scheduled Tylenol and ibuprofen for pain control. She rpefers to avoid stronger narcotics. She prefers to use tramadol as needed for post-op pain. She will have Flexeryl for muscle spasms.   She was told she will have a pec block at the time of surgery   We extensively went over the details of postoperative activity restrictions and she was given detailed written postop instructions today: drain use, use of soft surgical bra, 6-week recovery period, no lifting more than 10 pounds for 6 weeks, no pushing or pulling with her arms for at least 4 weeks, sleeping in beach chair or recliner position  She was instructed to purchase ABD pads to help pad her incisions in the postoperative period.  I demonstrated to her how to apply ABD pads along her IMF and lateral chest  She will have drains and we discussed that usually I am able to remove them at 1 to 2 weeks at  postop follow-up  We also discussed that I recommend at least 3 months between surgeries.  States she does want to proceed with panniculectomy after she heals from her breast reduction I recommend waiting 6 months.  This allows for 6-week postoperative recovery after breast reduction followed by 6-week period of normalcy.    I spent 45 minutes caring for Vanessa on this date of service. This time includes time spent by me in the following activities:preparing for the visit, reviewing tests, obtaining and/or reviewing a separately obtained history, performing a medically appropriate examination and/or evaluation , counseling and educating the patient/family/caregiver, ordering medications, tests, or procedures, documenting information in the medical record, independently interpreting results and communicating that information with the patient/family/caregiver, and care coordination      Follow Up     No follow-ups on file.    Patient was given instructions and counseling regarding her condition. Please see specific information pulled into the AVS if appropriate.     Kerry Henley MD  06/03/2025

## 2025-05-21 NOTE — PROGRESS NOTES
=Chief Complaint  Pre-op Exam (Pre op)    Subjective          History of Present Illness  Vanessa Giang is a 44 y.o. female with history of morbid obesity, history of Cushing's disease (pituitary adenoma), fibromyalgia, neuropathy and symptomatic macromastia who presents to [facility]   Patient is here for preoperative appointment for bilateral breast reduction mammoplasty.  She states since last visit her weight had remained stable.   She understands that any further significant weight loss or weight gain can affect her long-term results.  She says she has good understanding of the details and possible complications of the procedure. She feels that her large breasts have significant negative impact on her quality of life and she would like to proceed with surgery.   She was previously scheduled for surgery last month but elected to post-pond till end of June.     She was seen by cardiology on 05/22/2025. She has mild heart palpitations at times, but otherwise asymptomatic and hormonal lab work returned wnl. She has high cholesterol. She was told she can follow up with cardiology on as needed basis.         She denies any changes in her health. She is accompanied to this visit by her .     Prior history:     Patient used to weigh as much as 275 lbs until she had pituitary tumor removed in 2022.  Since then she had approximately 100 pound weight loss. Her current weight is 183lbs.   In November 2024 she had significantly changed her diet and cut out sugar.  She has continued to lose approximately 5 pounds per month.  She does not have goal weight and has not changed her diet with a goal to lose weight.    Patient has had large breasts since her pregnancy and being morbidly obese in the past.     As a result of her longstanding macromastia she has had neck pain, back pain, shoulder grooving and recurrent rashes under her breasts.  She is using nystatin under  "her breast to help control her rashes. She denies current rashes under he breasts. She is not as active as she would like to be due to her large breasts. She feels rashes are worse since weight loss as her beasts are even more deflated.   She has been going to chiropractor for 26 years and has had back surgery. Went to physical therapy last year several times.    Her clothing doesn't fit well due to excess loose skin throughout her body.     Her current bra size 42 F and she would like to be \"something smaller\". She denies personal history of breast pathology. No immediate family history of breast or ovarian cancer. Maternal cousin with breast cancer.    She has been compliant with her yearly mammograms and most recent mammogram on 10/18/24 showed benign results with Birads I (Normal). Patient denies any concerning masses, skin retraction or nipple drainage. She has never had breast biopsy.  She feels her breast are asymmetrical. She has normal present sensation in bilateral nipples.       Patient has a history of 1 pregnancies and 1 of births. She breast fed for 1 child for 16months    She had hysterectomy in the past. She is not on any blood thinners. She has neuropathy and is on gabapentin. She has multiple allergies.     Patient has good support system and her  will take care of her postoperatively.      Patient is taking the following over the counter supplements:  MSM, magnesium complex, blackberry extract, glucosamine and collagen, quercetin, fish oil, Ca, Vit C, multivitamin.    Patient denies chest pain or shortness of breath, no nausea vomiting.  No peripheral edema.  No unintentional weight loss or chills.  Patient denies personal or family history of excessive bleeding or blood clots, no adverse anesthesia reaction.  No recent illnesses.     History of Present Illness          Allergies: Cocamidopropyl betaine, Linalool, Nickel, Blue dye #2 (indigo carmine), Adhesive tape, Avocado, Bee pollen, " Benzalkonium, Benzalkonium chloride, Bowman, Bowman red [red dye #40 (allura red)], Formaldehyde, Gold, Latex, Lavender oil, Other, Propolis, Quaternium-15, and Tea tree oil  Allergies Reconciled.    Review of Systems  All system were reviewed and were negative, except the ones noted above.     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    Developed post surgery After tumor was removed from pituitary gland    Heart murmur     Hernia     Hiatal hernia    Hyperlipidemia     Iron deficiency anemia secondary to inadequate dietary iron intake 03/01/2023    Lactose intolerance     Liver disease     Nafld    Sleep apnea     no longer present      Past Surgical History:   Procedure Laterality Date    ABDOMINAL SURGERY      Endometrial ablation    ANKLE SURGERY Right 2017    COLONOSCOPY N/A 01/26/2024    Procedure: COLONOSCOPY TO CECUM WITH POLYPECTOMY;  Surgeon: Ida Stevens MD;  Location: [facility] OR;  Service: Gastroenterology;  Laterality: N/A;  Hemorrhoids, polyp    ENDOSCOPY  2016    Esophageal ablation    ENDOSCOPY N/A 08/31/2022    Procedure: ESOPHAGOGASTRODUODENOSCOPY WITH BX'S;  Surgeon: Ida Stevens MD;  Location: [facility] ENDOSCOPY;  Service: Gastroenterology;  Laterality: N/A;  pre: LUQ PAIN, HEARTBURN  post: IRREGULAR Z-LINE, ESOPHAGITIS    HYSTERECTOMY      TRANSPHENOIDAL / TRANSNASAL HYPOPHYSECTOMY / RESECTION PITUITARY TUMOR  04/2022    UPPER GASTROINTESTINAL ENDOSCOPY        Family History   Problem Relation Age of Onset    Alcohol abuse Mother     Hyperlipidemia Mother     Hypertension Mother     Alcohol abuse Father     Colon cancer Maternal Grandfather     Arrhythmia Maternal Grandfather     Hyperlipidemia Maternal Grandfather     Cancer Paternal Grandfather 70        colon cancer     Heart disease Maternal Grandmother     Karen Hyperthermia Neg Hx       No family history of blood clots or excessive bleeding.  No history of adverse anesthesia reactions.           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      gabapentin (NEURONTIN) 100 MG capsule       gabapentin (NEURONTIN) 300 MG capsule       glucosamine sulfate 500 MG capsule capsule Take  by mouth 3 (Three) Times a Day With Meals.      Magnesium 400 MG capsule       MAGNESIUM PO       Magnesium-Potassium 250-100 MG tablet       Multiple Vitamins-Minerals (b complex-C-E-zinc) tablet Take 1 tablet by mouth Daily.      Omega-3 Fatty Acids (fish oil) 1000 MG capsule capsule       QUERCETIN PO Take  by mouth Daily.      SELENIUM PO       Black Elderberry 50 MG/5ML syrup Take  by mouth. (Patient not taking: Reported on 6/3/2025)      clobetasol (TEMOVATE) 0.05 % ointment  (Patient not taking: Reported on 6/3/2025)      fluocinonide (LIDEX) 0.05 % external solution  (Patient not taking: Reported on 6/3/2025)      Unable to find 1 each 1 (One) Time. liposomal Vit C      [DISCONTINUED] cholecalciferol (VITAMIN D3) 1.25 MG (69853 UT) capsule Take  by mouth.      [DISCONTINUED] Cyanocobalamin (B-12 COMPLIANCE INJECTION IJ) as directed Once a month      [DISCONTINUED] esomeprazole (nexIUM) 40 MG capsule 1 capsule Daily.      [DISCONTINUED] ferrous sulfate 325 (65 Fe) MG tablet Take 1 tablet by mouth Daily.      [DISCONTINUED] Methylsulfonylmethane (MSM  "PO) Take  by mouth.       No current facility-administered medications on file prior to visit.         Objective     /79 (BP Location: Left arm, Patient Position: Sitting, Cuff Size: Adult)   Pulse 57   Temp 96.8 °F (36 °C) (Temporal)   Ht 170.2 cm (67.01\")   Wt 82.6 kg (182 lb)   SpO2 98%   BMI 28.50 kg/m²     Body mass index is 28.5 kg/m².    Physical Exam    Physical Exam          General: pleasant woman in NAD  CV: RR, light murmur  Lungs: CTAB, no wheezing  Breast exam: Bilateral large breasts with grade 3 ptosis with breasts similar in size, right nipple-areolar complex is slightly smaller and in lower position compared to the left, loss of upper pole fullness, deflated breasts, overall reasonable symmetry.  Some moisture under both breast, but no active rash. Bilateral breast soft with no concerning masses, skin retraction or nipple drainage.  Multiple stria. Bilateral lateral chest soft tissue rolls  SN-N: R 35 cm, L 34 cm  IMF-N: R 15 cm, L 17 cm   Breast width: R 17 cm, L 17 cm   N-N: 24 cm  NAC: R 8.5 x 5.5 cm, L 8.5 x 6.5 cm  Chest circumference at IMF level: 40.5  inches  Lymph: No cervical, supraclavicular or axillary lymphadenopathy  Abdomen: Obese, soft, nontender nondistended, large pannus, no palpable hernias, moisture but no rash under the pannus  Extremities: No peripheral edema, full range of motion  Psych: Appropriate mood and affect  Neuro: Grossly intact    Schnur Scale Score: 575  Body surface area is 1.94 meters squared.      Result Review :     CXR 05/22/2025:      FINDINGS:  Two (2) views of the chest (PA and lateral upright projections) are  provided for review. No cardiac enlargement is seen. No acute infiltrate  is appreciated. Lung expansion is within normal limits. No mediastinal  shift. The imaged airway is patent. No pleural effusion or pneumothorax  is identified. No obvious acute osseous abnormality is suggested. There  are suspected mild degenerative changes of the " right shoulder and the  imaged spine. There is a suspected nonspecific stent projected over the  abdomen at about the L3 level on the lateral view, which is not clearly  localized on the frontal projection.        IMPRESSION:  No acute infiltrate is appreciated. No cardiac enlargement.        Latest Reference Range & Units 05/22/25 16:44   Sodium 136 - 145 mmol/L 138   Potassium 3.5 - 5.2 mmol/L 3.9   Chloride 98 - 107 mmol/L 102   CO2 22.0 - 29.0 mmol/L 26.0   Anion Gap 5.0 - 15.0 mmol/L 10.0   BUN 6 - 20 mg/dL 10   Creatinine 0.57 - 1.00 mg/dL 0.73   BUN/Creatinine Ratio 7.0 - 25.0  13.7   eGFR >60.0 mL/min/1.73 102.9   Glucose 65 - 99 mg/dL 84   Calcium 8.6 - 10.5 mg/dL  8.6 - 10.5 mg/dL 9.7  9.6   Magnesium 1.6 - 2.6 mg/dL 2.1   Phosphorus 2.5 - 4.5 mg/dL 3.9   Alkaline Phosphatase 39 - 117 U/L 57   Total Protein 6.0 - 8.5 g/dL 6.6   Albumin 3.5 - 5.2 g/dL 4.2   Globulin gm/dL 2.4   A/G Ratio g/dL 1.8   AST (SGOT) 1 - 32 U/L 24   ALT (SGPT) 1 - 33 U/L 18   Total Bilirubin 0.0 - 1.2 mg/dL 0.3   PTH Intact (Serial Monitor) 15.0 - 65.0 pg/mL 20.4   Protime 11.8 - 14.9 Seconds 13.5   INR 0.86 - 1.15  0.99   PTT 24.2 - 34.2 seconds 29.4   WBC 3.40 - 10.80 10*3/mm3 8.94   RBC 3.77 - 5.28 10*6/mm3 4.00   Hemoglobin 12.0 - 15.9 g/dL 12.5   Hematocrit 34.0 - 46.6 % 38.2   Platelets 140 - 450 10*3/mm3 344   RDW 12.3 - 15.4 % 11.8 (L)   MCV 79.0 - 97.0 fL 95.5   MCH 26.6 - 33.0 pg 31.3   MCHC 31.5 - 35.7 g/dL 32.7   MPV 6.0 - 12.0 fL 10.6   RDW-SD 37.0 - 54.0 fl 40.8   Neutrophil Rel % 42.7 - 76.0 % 71.3   Lymphocyte Rel % 19.6 - 45.3 % 21.6   Monocyte Rel % 5.0 - 12.0 % 4.5 (L)   Eosinophil Rel % 0.3 - 6.2 % 1.8   Basophil Rel % 0.0 - 1.5 % 0.6   Immature Granulocyte Rel % 0.0 - 0.5 % 0.2   Neutrophils Absolute 1.70 - 7.00 10*3/mm3 6.38   Lymphocytes Absolute 0.70 - 3.10 10*3/mm3 1.93   Monocytes Absolute 0.10 - 0.90 10*3/mm3 0.40   Eosinophils Absolute 0.00 - 0.40 10*3/mm3 0.16   Basophils Absolute 0.00 - 0.20 10*3/mm3  0.05   Immature Grans, Absolute 0.00 - 0.05 10*3/mm3 0.02   nRBC 0.0 - 0.2 /100 WBC 0.0   (L): Data is abnormally low  Had a Daigle monitor in 2023   Had mild tricuspid and mitral regurgitation on echo in 2021   I do not see any recent CXR              Assessment and Plan      Assessment & Plan          Diagnoses and all orders for this visit:    1. Breast hypertrophy in female (Primary)    2. Excessive and redundant skin and subcutaneous tissue    3. Rash    4. Ptosis of breast          Assessment and plan (Breast):      Patient is a 44-year-old woman with history of morbid obesity, history of Cushing's disease (pituitary adenoma), fibromyalgia, neuropathy, symptomatic macromastia status post transsphenoidal pituitary adenoma excision in 2022 and subsequent massive weight loss who presents today for preoperative evaluation for bilateral breast reduction mammoplasty.  Mammogram is up-to-date.  She had hysterectomy in the past.  On physical exam patient has bilateral large breasts with grade 3 ptosis with reasonable symmetry.  No rashes under the breasts.   Both breasts are deflated with loss of upper pole fullness.  No concerning palpable masses, skin retraction or nipple drainage.  No concerning lymphadenopathy.     Patient would benefit from bilateral breast reduction mammoplasty.  Current BSA is 1.95 and I recommended 575 g of tissue to be removed from each side.  I discussed with the patient that while her breasts are fairly large her main issue is large skin envelope which is consistent with her history of massive weight loss.  This is a very common presentation for patients with significant weight loss.  While it is possible to remove 575 g of tissue from each breast this will substantially decrease her breast size.  However, she will not be up flat chested.  She states she does not mind being fairly small.  Cup size varies based on the company, but I would suspect she will have a full B to C cup.     Breast  reduction should significantly decrease the risk of recurrent rashes under her breasts.  Neck and back pain can be caused by a number of different factors including osteoarthritis, poor posture and others.  Hopefully, smaller breasts size will help alleviate some of the symptoms.     Patient has fairly long distance from sternum to the nipple areolar complex. I discussed the use of SPY angiography during surgery. If there is any concern regarding nipple areolar complex perfusion at the time of surgery the recommendation is to remove the nipple areolar complex and transferred as a skin graft at the time of surgery.  This would leave nipple completely insensate.    Today, I discussed with the patient possible mesh use at the time of   I discussed with the patient that given presence of grade 3 ptosis, very thin and poor skin quality with multiple stria and long distance from ST-N I recommend using inferior pedicle and reinforce the lower pole with mesh to help prevent pseudoptosis occurrence and have longer lasting results.    -- Patient's mother in law  had abdominal hernia repair with synthetic mesh, complicated by wound breakdown and several years non-healing wound. Patient was therefore very concerned about the mesh use.   I explained to her that the type of mesh used for her breast reduction is very different than the synthetic mesh used for abdominal wall reconstruction. Acellular dermal matrix is a biologic mesh and is more resistant to infection compared to certain types of synthetic mesh used for abd recon.   There are several synthetic meshes available for use in breast as well, but are also associated with very low risk of infection and generally resorbed by the body over the course of several months to 1-2 years.   I would plan to use an ADM for her breat reduction. I think this would be very beneficial in her case especially. However, I also understand she has some reservation based on the experience her  mother in law had. Hopefully the information I provided today will help reassure her.   I also reiterated that there is always a risk of infection with any surgery (whether mesh is used or not), but a number of steps are taken to help decrease the risk of this possible complication. Ultimately it is up to her if the mesh is used or not. She will think about it and let me know in the next few days to a week.         -- She was told she is at increased risk for dogears given the lateral chest soft tissue excess due to weight loss.      She is concerned about dog ears and we again discussed hat she has excess skin from massive weight loss and lateral chest excess skin wraps around her back. I will go as far lateral as I can to decrease the risk of dog ears but it is still possible.   One option is to consider upper back lift with excess skin excision and this can be done at the time of breast reduction. However it is considered a cosmetic procedure and will not be covered by insurance. She expressed understanding and wants to proceed with breast reduction only.      I discussed the details and possible complications of the procedure including but not limited to bleeding, infection, seroma, scar, delayed wound healing, asymmetry, partial complete nipple loss, nipple sensation changes which is usually numbness, numbness is usually temporary, but can be permanent, increased calcifications on mammogram and dog ears.        Also discussed the recommendation to wait 6 months after surgery to have the next mammogram.     At initial consultation we had extensive discussion regarding recommendation for weight stability for 6 months prior to proceeding with body contouring procedures.  Specifically for breast reduction mammoplasty the timing of surgery is often considered in light of severity of patient's symptomatology.     Patient's weight has been stable for the lat couple of months, but prior ot it she had ongoing  approximately 5 pound per month weight loss since November of last year.     We had extensive discussion that if she was going to continue to lose 5 pounds per month for the next 6 months that would be 30 pound weight loss and this may significantly affect her breast size and shape.     However we carry weight differently and lose weight differently.  With further weight loss her breast size and shape may change or may stay the same, but likely decrease.  There is no way for me to predict that.     Patient states that she is severely symptomatic from her large breasts and expresses understanding of our discussion.  She states she fully understands that any further weight loss again may affect her future breast size and shape and long-term results.  She would like to proceed with surgery at this time to help alleviate some of her symptoms and improve her quality of life.        Plan:     -- Patient is currently scheduled for bilateral breast reduction mammoplasty, SPY angiography and possible free nipple graft on 06/23/ 2025. She will think about possible mesh use and let me know in few days to a week.   -- [facility] consent form for bilateral breast reduction mammoplasty was signed today  -- Patient is aware we will need to sign hospital consent form for operation on day of surgery  Preoperative photos obtained at initial visit   BSA 1.95 and I recommended removal of 575 g from each breast per Schnur scale  Chest circumference at IMF level: 40.5  inches  Patient understands this will substantially decrease her breast size and states she does not mind having small breasts  Mammogram 10/18/2024, BI-RADS 1, she is aware recommendation to wait 6 months after surgery before next mammogram   Patient was instructed to reach out to her cardiologist for preoperative clearance.  She was seen by cardiology on 05/22/2025 and she was cleared to proceed with surgery. She was also instructed to f/u with them as needed   I asked patient  to reach out to her endocrinologist as well for preoperative clearance.  She is seen regularly every 6 months but is not due to see them until June.  This is something they may be able to weigh in on through electronic medical records and she may not necessitate an actual appointment with them before surgery.  However I do need their clearance  Patient was told she can continue her vitamin D, calcium and gabapentin, she was instructed to stop all her other over-the-counter supplements.  Her main concern is stopping magnesium.  She takes a very high dose of magnesium and says she treats her leg cramps with it.  Magnesium level on preoperative lab results on 05/22/2025 was 2.1 (wnl). In general, 1000 mg of magnesium daily is jaylin high dose. I asked patient she decrease the dose of Magnesium couple day before surgery   Pre-op CXR is done and wnl, but EKG still pending. She was asked to obtain it ASAP.   Recent endocrine labs on 05/22/2025 all wnl.   She is aware of nicotine testing per protocol now and on day of surgery  Pain control: patient was instructed to take scheduled Tylenol and ibuprofen for pain control. She rpefers to avoid stronger narcotics. She prefers to use tramadol as needed for post-op pain. She will have Flexeryl for muscle spasms.   She was told she will have a pec block at the time of surgery   We extensively went over the details of postoperative activity restrictions and she was given detailed written postop instructions today: drain use, use of soft surgical bra, 6-week recovery period, no lifting more than 10 pounds for 6 weeks, no pushing or pulling with her arms for at least 4 weeks, sleeping in beach chair or recliner position  She was instructed to purchase ABD pads to help pad her incisions in the postoperative period.  I demonstrated to her how to apply ABD pads along her IMF and lateral chest  She will have drains and we discussed that usually I am able to remove them at 1 to 2 weeks at  postop follow-up  We also discussed that I recommend at least 3 months between surgeries.  States she does want to proceed with panniculectomy after she heals from her breast reduction I recommend waiting 6 months.  This allows for 6-week postoperative recovery after breast reduction followed by 6-week period of normalcy.    I spent 45 minutes caring for Vanessa on this date of service. This time includes time spent by me in the following activities:preparing for the visit, reviewing tests, obtaining and/or reviewing a separately obtained history, performing a medically appropriate examination and/or evaluation , counseling and educating the patient/family/caregiver, ordering medications, tests, or procedures, documenting information in the medical record, independently interpreting results and communicating that information with the patient/family/caregiver, and care coordination      Follow Up     No follow-ups on file.    Patient was given instructions and counseling regarding her condition. Please see specific information pulled into the AVS if appropriate.     Kerry Henley MD  06/03/2025

## 2025-05-22 ENCOUNTER — LAB (OUTPATIENT)
Dept: LAB | Facility: HOSPITAL | Age: 45
End: 2025-05-22
Payer: MEDICAID

## 2025-05-22 ENCOUNTER — HOSPITAL ENCOUNTER (OUTPATIENT)
Dept: GENERAL RADIOLOGY | Facility: HOSPITAL | Age: 45
Discharge: HOME | End: 2025-05-22
Payer: MEDICAID

## 2025-05-22 DIAGNOSIS — N62 BREAST HYPERTROPHY IN FEMALE: ICD-10-CM

## 2025-05-22 DIAGNOSIS — R21 RASH: ICD-10-CM

## 2025-05-22 DIAGNOSIS — L98.7 EXCESSIVE AND REDUNDANT SKIN AND SUBCUTANEOUS TISSUE: ICD-10-CM

## 2025-05-22 LAB
ALBUMIN SERPL-MCNC: 4.2 G/DL (ref 3.5–5.2)
ALBUMIN/GLOB SERPL: 1.8 G/DL
ALP SERPL-CCNC: 57 U/L (ref 39–117)
ALT SERPL W P-5'-P-CCNC: 18 U/L (ref 1–33)
ANION GAP SERPL CALCULATED.3IONS-SCNC: 10 MMOL/L (ref 5–15)
APTT PPP: 29.4 SECONDS (ref 24.2–34.2)
AST SERPL-CCNC: 24 U/L (ref 1–32)
BASOPHILS # BLD AUTO: 0.05 10*3/MM3 (ref 0–0.2)
BASOPHILS NFR BLD AUTO: 0.6 % (ref 0–1.5)
BILIRUB SERPL-MCNC: 0.3 MG/DL (ref 0–1.2)
BUN SERPL-MCNC: 10 MG/DL (ref 6–20)
BUN/CREAT SERPL: 13.7 (ref 7–25)
CALCIUM SPEC-SCNC: 9.6 MG/DL (ref 8.6–10.5)
CALCIUM SPEC-SCNC: 9.7 MG/DL (ref 8.6–10.5)
CHLORIDE SERPL-SCNC: 102 MMOL/L (ref 98–107)
CO2 SERPL-SCNC: 26 MMOL/L (ref 22–29)
CREAT SERPL-MCNC: 0.73 MG/DL (ref 0.57–1)
DEPRECATED RDW RBC AUTO: 40.8 FL (ref 37–54)
EGFRCR SERPLBLD CKD-EPI 2021: 102.9 ML/MIN/1.73
EOSINOPHIL # BLD AUTO: 0.16 10*3/MM3 (ref 0–0.4)
EOSINOPHIL NFR BLD AUTO: 1.8 % (ref 0.3–6.2)
ERYTHROCYTE [DISTWIDTH] IN BLOOD BY AUTOMATED COUNT: 11.8 % (ref 12.3–15.4)
GLOBULIN UR ELPH-MCNC: 2.4 GM/DL
GLUCOSE SERPL-MCNC: 84 MG/DL (ref 65–99)
HCT VFR BLD AUTO: 38.2 % (ref 34–46.6)
HGB BLD-MCNC: 12.5 G/DL (ref 12–15.9)
IMM GRANULOCYTES # BLD AUTO: 0.02 10*3/MM3 (ref 0–0.05)
IMM GRANULOCYTES NFR BLD AUTO: 0.2 % (ref 0–0.5)
INR PPP: 0.99 (ref 0.86–1.15)
LYMPHOCYTES # BLD AUTO: 1.93 10*3/MM3 (ref 0.7–3.1)
LYMPHOCYTES NFR BLD AUTO: 21.6 % (ref 19.6–45.3)
MAGNESIUM SERPL-MCNC: 2.1 MG/DL (ref 1.6–2.6)
MCH RBC QN AUTO: 31.3 PG (ref 26.6–33)
MCHC RBC AUTO-ENTMCNC: 32.7 G/DL (ref 31.5–35.7)
MCV RBC AUTO: 95.5 FL (ref 79–97)
MONOCYTES # BLD AUTO: 0.4 10*3/MM3 (ref 0.1–0.9)
MONOCYTES NFR BLD AUTO: 4.5 % (ref 5–12)
NEUTROPHILS NFR BLD AUTO: 6.38 10*3/MM3 (ref 1.7–7)
NEUTROPHILS NFR BLD AUTO: 71.3 % (ref 42.7–76)
NRBC BLD AUTO-RTO: 0 /100 WBC (ref 0–0.2)
PHOSPHATE SERPL-MCNC: 3.9 MG/DL (ref 2.5–4.5)
PLATELET # BLD AUTO: 344 10*3/MM3 (ref 140–450)
PMV BLD AUTO: 10.6 FL (ref 6–12)
POTASSIUM SERPL-SCNC: 3.9 MMOL/L (ref 3.5–5.2)
PROT SERPL-MCNC: 6.6 G/DL (ref 6–8.5)
PROTHROMBIN TIME: 13.5 SECONDS (ref 11.8–14.9)
PTH-INTACT SERPL-MCNC: 20.4 PG/ML (ref 15–65)
RBC # BLD AUTO: 4 10*6/MM3 (ref 3.77–5.28)
SODIUM SERPL-SCNC: 138 MMOL/L (ref 136–145)
WBC NRBC COR # BLD AUTO: 8.94 10*3/MM3 (ref 3.4–10.8)

## 2025-05-22 PROCEDURE — 80053 COMPREHEN METABOLIC PANEL: CPT

## 2025-05-22 PROCEDURE — 85025 COMPLETE CBC W/AUTO DIFF WBC: CPT

## 2025-05-22 PROCEDURE — 36415 COLL VENOUS BLD VENIPUNCTURE: CPT

## 2025-05-22 PROCEDURE — 83735 ASSAY OF MAGNESIUM: CPT

## 2025-05-22 PROCEDURE — 84100 ASSAY OF PHOSPHORUS: CPT

## 2025-05-22 PROCEDURE — 85610 PROTHROMBIN TIME: CPT

## 2025-05-22 PROCEDURE — 71046 X-RAY EXAM CHEST 2 VIEWS: CPT

## 2025-05-22 PROCEDURE — 85730 THROMBOPLASTIN TIME PARTIAL: CPT

## 2025-05-22 PROCEDURE — 83970 ASSAY OF PARATHORMONE: CPT

## 2025-06-03 ENCOUNTER — OFFICE VISIT (OUTPATIENT)
Dept: PLASTIC SURGERY | Facility: CLINIC | Age: 45
End: 2025-06-03
Payer: MEDICAID

## 2025-06-03 VITALS
OXYGEN SATURATION: 98 % | HEIGHT: 67 IN | BODY MASS INDEX: 28.56 KG/M2 | SYSTOLIC BLOOD PRESSURE: 114 MMHG | DIASTOLIC BLOOD PRESSURE: 79 MMHG | TEMPERATURE: 96.8 F | WEIGHT: 182 LBS | HEART RATE: 57 BPM

## 2025-06-03 DIAGNOSIS — L98.7 EXCESSIVE AND REDUNDANT SKIN AND SUBCUTANEOUS TISSUE: ICD-10-CM

## 2025-06-03 DIAGNOSIS — N62 BREAST HYPERTROPHY IN FEMALE: Primary | ICD-10-CM

## 2025-06-03 DIAGNOSIS — N64.81 PTOSIS OF BREAST: ICD-10-CM

## 2025-06-03 DIAGNOSIS — R21 RASH: ICD-10-CM

## 2025-06-03 RX ORDER — ESOMEPRAZOLE MAGNESIUM 40 MG/1
1 CAPSULE, DELAYED RELEASE ORAL EVERY 24 HOURS
COMMUNITY
End: 2025-06-03

## 2025-06-03 RX ORDER — PNV NO.95/FERROUS FUM/FOLIC AC 28MG-0.8MG
1 TABLET ORAL DAILY
COMMUNITY
End: 2025-06-03

## 2025-06-05 ENCOUNTER — TELEPHONE (OUTPATIENT)
Dept: PLASTIC SURGERY | Facility: CLINIC | Age: 45
End: 2025-06-05
Payer: MEDICAID

## 2025-06-05 NOTE — TELEPHONE ENCOUNTER
Spoke to patient and she states that EKG was completed by cardiology.  She will contact their office and request it be faxed to us along with clearance.

## 2025-06-05 NOTE — TELEPHONE ENCOUNTER
----- Message from Kerry Henley sent at 6/4/2025  8:24 PM EDT -----  Hi. I do not see that patient had pre-op EKG done. I see the order but it hasn't been done from what I see.     Can we call her and ask her if she had it done at her [facility] [place] appointment.   I saw cardiology note, but it didn't specifically say she is ok to proceed with surgery. Do we have that somewhere?  Thank you :)

## 2025-06-10 ENCOUNTER — TELEPHONE (OUTPATIENT)
Dept: PLASTIC SURGERY | Facility: CLINIC | Age: 45
End: 2025-06-10
Payer: MEDICAID

## 2025-06-10 NOTE — TELEPHONE ENCOUNTER
Caller: Vanessa Giang    Relationship: Self    Best call back number: [phone]    What is the best time to reach you: ANYTIME    Who are you requesting to speak with (clinical staff, provider,  specific staff member): ANYONE THAT CAN VERIFY THAT YOU HAVE RECEIVED CARDIAC CLEARANCE AND EKG FROM PATIENTS CARDIOLOGIST.     PLEASE CALL PATIENT TO VERIFY!

## 2025-06-16 ENCOUNTER — TELEPHONE (OUTPATIENT)
Dept: PLASTIC SURGERY | Facility: CLINIC | Age: 45
End: 2025-06-16
Payer: MEDICAID

## 2025-06-16 NOTE — TELEPHONE ENCOUNTER
Patient calls and states she was reading her pre op information that she took home with her in the folder we gave her at her last visit. She came across pre op medications with tylenol, naproxen and gabapentin she will need to take 3 days before surgery and 3 days after. She already takes gabapentin. She is wondering if she needs the hibiclens as well called in or if she needs any pre op medications called in. Her [facility] is [facility] on [address] here in [facility].       She is aware I will call her back once I get clarification.

## 2025-06-16 NOTE — TELEPHONE ENCOUNTER
Tried calling patient with no answer to clarify pre op medications for her. I spoke to Dr. Henley and she states that medication list she received in her folder was Dr. Strickland's to just disregard. She states she will get post operative medications day of surgery and for her to have for after surgery but nothing prior to surgery she will need to take.    We need to know if she has decided on if she would like mesh or not.    She is aware to call the office and let us know.

## 2025-06-18 ENCOUNTER — LAB (OUTPATIENT)
Facility: HOSPITAL | Age: 45
End: 2025-06-18
Payer: MEDICAID

## 2025-06-18 DIAGNOSIS — N62 BREAST HYPERTROPHY IN FEMALE: Primary | ICD-10-CM

## 2025-06-18 DIAGNOSIS — N62 BREAST HYPERTROPHY IN FEMALE: ICD-10-CM

## 2025-06-18 LAB — COTININE UR-MCNC: NEGATIVE NG/ML

## 2025-06-18 PROCEDURE — G0480 DRUG TEST DEF 1-7 CLASSES: HCPCS

## 2025-06-18 RX ORDER — POLYETHYLENE GLYCOL 3350 17 G/17G
17 POWDER, FOR SOLUTION ORAL DAILY
Qty: 15 PACKET | Refills: 0 | Status: SHIPPED | OUTPATIENT
Start: 2025-06-18

## 2025-06-18 RX ORDER — ONDANSETRON 4 MG/1
4 TABLET, FILM COATED ORAL EVERY 8 HOURS PRN
Qty: 15 TABLET | Refills: 0 | Status: SHIPPED | OUTPATIENT
Start: 2025-06-18

## 2025-06-18 RX ORDER — BACLOFEN 10 MG/1
10 TABLET ORAL EVERY 8 HOURS PRN
Qty: 15 TABLET | Refills: 0 | Status: SHIPPED | OUTPATIENT
Start: 2025-06-18

## 2025-06-18 RX ORDER — TRAMADOL HYDROCHLORIDE 50 MG/1
25-50 TABLET ORAL EVERY 6 HOURS PRN
Qty: 15 TABLET | Refills: 0 | Status: SHIPPED | OUTPATIENT
Start: 2025-06-18

## 2025-06-19 NOTE — PRE-PROCEDURE INSTRUCTIONS
IMPORTANT INSTRUCTIONS - PRE-ADMISSION TESTING  DO NOT EAT OR CHEW anything after midnight the night before your procedure.    You may have CLEAR liquids up to __2__ hours prior to ARRIVAL time.   Take the following medications the morning of your procedure with JUST A SIP OF WATER:  GABAPENTIN, ACELYLCYSTEINE _______________________________________________________________________________________________________________________________________________________________________________________    DO NOT BRING your medications to the hospital with you, UNLESS something has changed since your PRE-Admission Testing appointment.  Hold all vitamins, supplements, and NSAIDS (Non- steroidal anti-inflammatory meds) for one week prior to surgery (you MAY take Tylenol or Acetaminophen).  If you are diabetic, check your blood sugar the morning of your procedure. If it is less than 70 or if you are feeling symptomatic, call the following number for further instructions: [phone]  Use your inhalers/nebulizers as usual, the morning of your procedure. BRING YOUR INHALERS with you.   Bring your CPAP or BIPAP to hospital, ONLY IF YOU WILL BE SPENDING THE NIGHT.   Make sure you have a ride home and have someone who will stay with you the day of your procedure after you go home.  If you have any questions, please call your Pre-Admission Testing Nurse, KEKE_________ at [phone]_.   Per anesthesia request, do not smoke for 24 hours before your procedure or as instructed by your surgeon.    Clear Liquid Diet        Find out when you need to start a clear liquid diet.   Think of “clear liquids” as anything you could read a newspaper through. This includes things like water, broth, sports drinks, or tea WITHOUT any kind of milk or cream.           Once you are told to start a clear liquid diet, only drink these things until 2 hours before arrival to the hospital or when the hospital says to stop. Total volume limitation: 8  oz.       Clear liquids you CAN drink:   Water   Clear broth: beef, chicken, vegetable, or bone broth with nothing in it   Gatorade   Lemonade or Saurabh-aid   Soda   Tea, coffee (NO cream or honey)   Jell-O (without fruit)   Popsicles (without fruit or cream)   Italian ices   Juice without pulp: apple, white, grape   You may use salt, pepper, and sugar    Do NOT drink:   Milk or cream   Soy milk, almond milk, coconut milk, or other non-dairy drinks and   creamers   Milkshakes or smoothies   Tomato juice   Orange juice   Grapefruit juice   Cream soups or any other than broth         Clear Liquid Diet:  Do NOT eat any solid food.  Do NOT eat or suck on mints or candy.  Do NOT chew gum.  Do NOT drink thick liquids like milk or juice with pulp in it.  Do NOT add milk, cream, or anything like soy milk or almond milk to coffee or tea.

## 2025-06-23 ENCOUNTER — ANESTHESIA (OUTPATIENT)
Dept: PERIOP | Facility: HOSPITAL | Age: 45
End: 2025-06-23
Payer: MEDICAID

## 2025-06-23 ENCOUNTER — HOSPITAL ENCOUNTER (OUTPATIENT)
Facility: HOSPITAL | Age: 45
Setting detail: HOSPITAL OUTPATIENT SURGERY
Discharge: HOME | End: 2025-06-23
Attending: SURGERY | Admitting: SURGERY
Payer: MEDICAID

## 2025-06-23 ENCOUNTER — ANESTHESIA EVENT (OUTPATIENT)
Dept: PERIOP | Facility: HOSPITAL | Age: 45
End: 2025-06-23
Payer: MEDICAID

## 2025-06-23 VITALS
RESPIRATION RATE: 16 BRPM | WEIGHT: 183.86 LBS | BODY MASS INDEX: 29.55 KG/M2 | HEART RATE: 76 BPM | DIASTOLIC BLOOD PRESSURE: 61 MMHG | OXYGEN SATURATION: 99 % | TEMPERATURE: 97.3 F | SYSTOLIC BLOOD PRESSURE: 97 MMHG | HEIGHT: 66 IN

## 2025-06-23 DIAGNOSIS — N62 BREAST HYPERTROPHY IN FEMALE: ICD-10-CM

## 2025-06-23 LAB — COTININE UR-MCNC: NEGATIVE NG/ML

## 2025-06-23 PROCEDURE — 25010000002 KETOROLAC TROMETHAMINE PER 15 MG: Performed by: NURSE ANESTHETIST, CERTIFIED REGISTERED

## 2025-06-23 PROCEDURE — 25010000002 LIDOCAINE 1% - EPINEPHRINE 1:100000 1 %-1:100000 SOLUTION: Performed by: SURGERY

## 2025-06-23 PROCEDURE — 25010000002 DEXAMETHASONE PER 1 MG: Performed by: NURSE ANESTHETIST, CERTIFIED REGISTERED

## 2025-06-23 PROCEDURE — 25010000002 ONDANSETRON PER 1 MG: Performed by: NURSE ANESTHETIST, CERTIFIED REGISTERED

## 2025-06-23 PROCEDURE — 25010000002 INDOCYANINE GREEN 25 MG RECONSTITUTED SOLUTION: Performed by: SURGERY

## 2025-06-23 PROCEDURE — G0480 DRUG TEST DEF 1-7 CLASSES: HCPCS | Performed by: SURGERY

## 2025-06-23 PROCEDURE — 25010000002 ONDANSETRON PER 1 MG: Performed by: SURGERY

## 2025-06-23 PROCEDURE — 88305 TISSUE EXAM BY PATHOLOGIST: CPT | Performed by: SURGERY

## 2025-06-23 PROCEDURE — 15777 ACELLULAR DERM MATRIX IMPLT: CPT | Performed by: SURGERY

## 2025-06-23 PROCEDURE — 93005 ELECTROCARDIOGRAM TRACING: CPT | Performed by: SURGERY

## 2025-06-23 PROCEDURE — 25010000002 PROPOFOL 10 MG/ML EMULSION: Performed by: NURSE ANESTHETIST, CERTIFIED REGISTERED

## 2025-06-23 PROCEDURE — 25010000002 CEFAZOLIN PER 500 MG: Performed by: SURGERY

## 2025-06-23 PROCEDURE — 25010000002 MIDAZOLAM PER 1MG: Performed by: ANESTHESIOLOGY

## 2025-06-23 PROCEDURE — 25010000002 ALBUMIN HUMAN 5% PER 50 ML: Performed by: NURSE ANESTHETIST, CERTIFIED REGISTERED

## 2025-06-23 PROCEDURE — 25010000002 FENTANYL CITRATE (PF) 50 MCG/ML SOLUTION: Performed by: NURSE ANESTHETIST, CERTIFIED REGISTERED

## 2025-06-23 PROCEDURE — 25010000002 LIDOCAINE PF 2% 2 % SOLUTION: Performed by: NURSE ANESTHETIST, CERTIFIED REGISTERED

## 2025-06-23 PROCEDURE — 25010000002 PHENYLEPHRINE HCL-NACL 1000-0.9 MCG/10ML-% SOLUTION PREFILLED SYRINGE: Performed by: NURSE ANESTHETIST, CERTIFIED REGISTERED

## 2025-06-23 PROCEDURE — 25810000003 SODIUM CHLORIDE 0.9 % SOLUTION: Performed by: SURGERY

## 2025-06-23 PROCEDURE — 25810000003 LACTATED RINGERS PER 1000 ML: Performed by: SURGERY

## 2025-06-23 PROCEDURE — P9041 ALBUMIN (HUMAN),5%, 50ML: HCPCS | Performed by: NURSE ANESTHETIST, CERTIFIED REGISTERED

## 2025-06-23 PROCEDURE — 19318 BREAST REDUCTION: CPT | Performed by: SURGERY

## 2025-06-23 PROCEDURE — 25010000002 SUGAMMADEX 200 MG/2ML SOLUTION: Performed by: NURSE ANESTHETIST, CERTIFIED REGISTERED

## 2025-06-23 PROCEDURE — 93010 ELECTROCARDIOGRAM REPORT: CPT | Performed by: INTERNAL MEDICINE

## 2025-06-23 DEVICE — SUT CONTRL TISS STRATAFIX SPIRAL PDS PLS SH1 3/0 20CM: Type: IMPLANTABLE DEVICE | Site: CHEST | Status: FUNCTIONAL

## 2025-06-23 DEVICE — LIGACLIP MCA MULTIPLE CLIP APPLIERS, 20 MEDIUM CLIPS
Type: IMPLANTABLE DEVICE | Site: CHEST | Status: FUNCTIONAL
Brand: LIGACLIP

## 2025-06-23 DEVICE — ALLOGRFT DERM CORTIVA 1MM 16X20CM: Type: IMPLANTABLE DEVICE | Site: CHEST | Status: FUNCTIONAL

## 2025-06-23 RX ORDER — PROMETHAZINE HYDROCHLORIDE 25 MG/1
25 TABLET ORAL ONCE AS NEEDED
Status: DISCONTINUED | OUTPATIENT
Start: 2025-06-23 | End: 2025-06-23 | Stop reason: HOSPADM

## 2025-06-23 RX ORDER — SODIUM CHLORIDE 0.9 % (FLUSH) 0.9 %
10 SYRINGE (ML) INJECTION AS NEEDED
Status: DISCONTINUED | OUTPATIENT
Start: 2025-06-23 | End: 2025-06-23 | Stop reason: HOSPADM

## 2025-06-23 RX ORDER — ONDANSETRON 2 MG/ML
INJECTION INTRAMUSCULAR; INTRAVENOUS AS NEEDED
Status: DISCONTINUED | OUTPATIENT
Start: 2025-06-23 | End: 2025-06-23 | Stop reason: SURG

## 2025-06-23 RX ORDER — ONDANSETRON 2 MG/ML
4 INJECTION INTRAMUSCULAR; INTRAVENOUS ONCE AS NEEDED
Status: DISCONTINUED | OUTPATIENT
Start: 2025-06-23 | End: 2025-06-23 | Stop reason: HOSPADM

## 2025-06-23 RX ORDER — DEXAMETHASONE SODIUM PHOSPHATE 4 MG/ML
INJECTION, SOLUTION INTRA-ARTICULAR; INTRALESIONAL; INTRAMUSCULAR; INTRAVENOUS; SOFT TISSUE AS NEEDED
Status: DISCONTINUED | OUTPATIENT
Start: 2025-06-23 | End: 2025-06-23 | Stop reason: SURG

## 2025-06-23 RX ORDER — EPHEDRINE SULFATE 50 MG/ML
INJECTION INTRAVENOUS AS NEEDED
Status: DISCONTINUED | OUTPATIENT
Start: 2025-06-23 | End: 2025-06-23 | Stop reason: SURG

## 2025-06-23 RX ORDER — PHENYLEPHRINE HCL IN 0.9% NACL 1 MG/10 ML
SYRINGE (ML) INTRAVENOUS AS NEEDED
Status: DISCONTINUED | OUTPATIENT
Start: 2025-06-23 | End: 2025-06-23 | Stop reason: SURG

## 2025-06-23 RX ORDER — VITAMIN B COMPLEX
CAPSULE ORAL DAILY
COMMUNITY

## 2025-06-23 RX ORDER — ACETAMINOPHEN 500 MG
1000 TABLET ORAL
Status: COMPLETED | OUTPATIENT
Start: 2025-06-23 | End: 2025-06-23

## 2025-06-23 RX ORDER — SCOPOLAMINE 1 MG/3D
1 PATCH, EXTENDED RELEASE TRANSDERMAL CONTINUOUS
Status: DISCONTINUED | OUTPATIENT
Start: 2025-06-23 | End: 2025-06-23 | Stop reason: HOSPADM

## 2025-06-23 RX ORDER — INDOCYANINE GREEN AND WATER 25 MG
KIT INJECTION AS NEEDED
Status: DISCONTINUED | OUTPATIENT
Start: 2025-06-23 | End: 2025-06-23 | Stop reason: HOSPADM

## 2025-06-23 RX ORDER — INDOCYANINE GREEN AND WATER 25 MG
7.5 KIT INJECTION ONCE
Status: COMPLETED | OUTPATIENT
Start: 2025-06-23 | End: 2025-06-23

## 2025-06-23 RX ORDER — SODIUM CHLORIDE, SODIUM LACTATE, POTASSIUM CHLORIDE, CALCIUM CHLORIDE 600; 310; 30; 20 MG/100ML; MG/100ML; MG/100ML; MG/100ML
100 INJECTION, SOLUTION INTRAVENOUS CONTINUOUS
Status: ACTIVE | OUTPATIENT
Start: 2025-06-23 | End: 2025-06-23

## 2025-06-23 RX ORDER — LIDOCAINE HYDROCHLORIDE AND EPINEPHRINE 10; 10 MG/ML; UG/ML
INJECTION, SOLUTION INFILTRATION; PERINEURAL AS NEEDED
Status: DISCONTINUED | OUTPATIENT
Start: 2025-06-23 | End: 2025-06-23 | Stop reason: HOSPADM

## 2025-06-23 RX ORDER — SODIUM CHLORIDE, SODIUM LACTATE, POTASSIUM CHLORIDE, CALCIUM CHLORIDE 600; 310; 30; 20 MG/100ML; MG/100ML; MG/100ML; MG/100ML
9 INJECTION, SOLUTION INTRAVENOUS CONTINUOUS PRN
Status: DISCONTINUED | OUTPATIENT
Start: 2025-06-23 | End: 2025-06-23 | Stop reason: HOSPADM

## 2025-06-23 RX ORDER — KETOROLAC TROMETHAMINE 30 MG/ML
INJECTION, SOLUTION INTRAMUSCULAR; INTRAVENOUS AS NEEDED
Status: DISCONTINUED | OUTPATIENT
Start: 2025-06-23 | End: 2025-06-23 | Stop reason: SURG

## 2025-06-23 RX ORDER — MAGNESIUM HYDROXIDE 1200 MG/15ML
LIQUID ORAL AS NEEDED
Status: DISCONTINUED | OUTPATIENT
Start: 2025-06-23 | End: 2025-06-23 | Stop reason: HOSPADM

## 2025-06-23 RX ORDER — ALBUMIN HUMAN 50 G/1000ML
SOLUTION INTRAVENOUS CONTINUOUS PRN
Status: DISCONTINUED | OUTPATIENT
Start: 2025-06-23 | End: 2025-06-23 | Stop reason: SURG

## 2025-06-23 RX ORDER — MIDAZOLAM HYDROCHLORIDE 2 MG/2ML
2 INJECTION, SOLUTION INTRAMUSCULAR; INTRAVENOUS ONCE
Status: COMPLETED | OUTPATIENT
Start: 2025-06-23 | End: 2025-06-23

## 2025-06-23 RX ORDER — ACETAMINOPHEN 500 MG
1000 TABLET ORAL ONCE
Status: DISCONTINUED | OUTPATIENT
Start: 2025-06-23 | End: 2025-06-23 | Stop reason: HOSPADM

## 2025-06-23 RX ORDER — PROMETHAZINE HYDROCHLORIDE 25 MG/1
25 SUPPOSITORY RECTAL ONCE AS NEEDED
Status: DISCONTINUED | OUTPATIENT
Start: 2025-06-23 | End: 2025-06-23 | Stop reason: HOSPADM

## 2025-06-23 RX ORDER — EPHEDRINE SULFATE 50 MG/ML
41.7 INJECTION, SOLUTION INTRAVENOUS ONCE
Status: COMPLETED | OUTPATIENT
Start: 2025-06-23 | End: 2025-06-23

## 2025-06-23 RX ORDER — ONDANSETRON 2 MG/ML
4 INJECTION INTRAMUSCULAR; INTRAVENOUS
Status: COMPLETED | OUTPATIENT
Start: 2025-06-23 | End: 2025-06-23

## 2025-06-23 RX ORDER — FENTANYL CITRATE 50 UG/ML
INJECTION, SOLUTION INTRAMUSCULAR; INTRAVENOUS AS NEEDED
Status: DISCONTINUED | OUTPATIENT
Start: 2025-06-23 | End: 2025-06-23 | Stop reason: SURG

## 2025-06-23 RX ORDER — DEXMEDETOMIDINE HYDROCHLORIDE 100 UG/ML
INJECTION, SOLUTION INTRAVENOUS AS NEEDED
Status: DISCONTINUED | OUTPATIENT
Start: 2025-06-23 | End: 2025-06-23 | Stop reason: SURG

## 2025-06-23 RX ORDER — SODIUM CHLORIDE 9 MG/ML
40 INJECTION, SOLUTION INTRAVENOUS AS NEEDED
Status: DISCONTINUED | OUTPATIENT
Start: 2025-06-23 | End: 2025-06-23 | Stop reason: HOSPADM

## 2025-06-23 RX ORDER — LIDOCAINE HYDROCHLORIDE 20 MG/ML
INJECTION, SOLUTION EPIDURAL; INFILTRATION; INTRACAUDAL; PERINEURAL AS NEEDED
Status: DISCONTINUED | OUTPATIENT
Start: 2025-06-23 | End: 2025-06-23 | Stop reason: SURG

## 2025-06-23 RX ORDER — ROCURONIUM BROMIDE 10 MG/ML
INJECTION, SOLUTION INTRAVENOUS AS NEEDED
Status: DISCONTINUED | OUTPATIENT
Start: 2025-06-23 | End: 2025-06-23 | Stop reason: SURG

## 2025-06-23 RX ORDER — OXYCODONE HYDROCHLORIDE 5 MG/1
5 TABLET ORAL
Refills: 0 | Status: DISCONTINUED | OUTPATIENT
Start: 2025-06-23 | End: 2025-06-23 | Stop reason: HOSPADM

## 2025-06-23 RX ORDER — PROPOFOL 10 MG/ML
VIAL (ML) INTRAVENOUS AS NEEDED
Status: DISCONTINUED | OUTPATIENT
Start: 2025-06-23 | End: 2025-06-23 | Stop reason: SURG

## 2025-06-23 RX ORDER — SODIUM CHLORIDE 0.9 % (FLUSH) 0.9 %
10 SYRINGE (ML) INJECTION EVERY 12 HOURS SCHEDULED
Status: DISCONTINUED | OUTPATIENT
Start: 2025-06-23 | End: 2025-06-23 | Stop reason: HOSPADM

## 2025-06-23 RX ADMIN — EPHEDRINE SULFATE 5 MG: 50 INJECTION INTRAVENOUS at 07:55

## 2025-06-23 RX ADMIN — ACETAMINOPHEN 1000 MG: 500 TABLET ORAL at 07:28

## 2025-06-23 RX ADMIN — EPHEDRINE SULFATE 10 MG: 50 INJECTION INTRAVENOUS at 12:12

## 2025-06-23 RX ADMIN — EPHEDRINE SULFATE 5 MG: 50 INJECTION INTRAVENOUS at 09:30

## 2025-06-23 RX ADMIN — SODIUM CHLORIDE 1000 MG: 9 INJECTION, SOLUTION INTRAVENOUS at 10:53

## 2025-06-23 RX ADMIN — ONDANSETRON 4 MG: 2 INJECTION INTRAMUSCULAR; INTRAVENOUS at 13:15

## 2025-06-23 RX ADMIN — ROCURONIUM BROMIDE 20 MG: 10 INJECTION, SOLUTION INTRAVENOUS at 11:46

## 2025-06-23 RX ADMIN — DEXMEDETOMIDINE 10 MCG: 100 INJECTION, SOLUTION, CONCENTRATE INTRAVENOUS at 09:45

## 2025-06-23 RX ADMIN — DEXMEDETOMIDINE 10 MCG: 100 INJECTION, SOLUTION, CONCENTRATE INTRAVENOUS at 09:40

## 2025-06-23 RX ADMIN — EPHEDRINE SULFATE 41.7 MG: 50 INJECTION INTRAVENOUS at 15:09

## 2025-06-23 RX ADMIN — FENTANYL CITRATE 50 MCG: 50 INJECTION, SOLUTION INTRAMUSCULAR; INTRAVENOUS at 07:41

## 2025-06-23 RX ADMIN — SODIUM CHLORIDE 500 ML: 9 INJECTION, SOLUTION INTRAVENOUS at 07:36

## 2025-06-23 RX ADMIN — SUGAMMADEX 100 MG: 100 INJECTION, SOLUTION INTRAVENOUS at 13:54

## 2025-06-23 RX ADMIN — SODIUM CHLORIDE 2000 MG: 9 INJECTION, SOLUTION INTRAVENOUS at 06:41

## 2025-06-23 RX ADMIN — SODIUM CHLORIDE, POTASSIUM CHLORIDE, SODIUM LACTATE AND CALCIUM CHLORIDE: 600; 310; 30; 20 INJECTION, SOLUTION INTRAVENOUS at 12:39

## 2025-06-23 RX ADMIN — ONDANSETRON 4 MG: 2 INJECTION INTRAMUSCULAR; INTRAVENOUS at 07:52

## 2025-06-23 RX ADMIN — ROCURONIUM BROMIDE 100 MG: 10 INJECTION, SOLUTION INTRAVENOUS at 07:42

## 2025-06-23 RX ADMIN — PROPOFOL 20 MG: 10 INJECTION, EMULSION INTRAVENOUS at 13:35

## 2025-06-23 RX ADMIN — SCOPOLAMINE 1 PATCH: 1.5 PATCH, EXTENDED RELEASE TRANSDERMAL at 07:28

## 2025-06-23 RX ADMIN — FENTANYL CITRATE 50 MCG: 50 INJECTION, SOLUTION INTRAMUSCULAR; INTRAVENOUS at 08:12

## 2025-06-23 RX ADMIN — ROCURONIUM BROMIDE 30 MG: 10 INJECTION, SOLUTION INTRAVENOUS at 09:20

## 2025-06-23 RX ADMIN — EPHEDRINE SULFATE 5 MG: 50 INJECTION INTRAVENOUS at 13:20

## 2025-06-23 RX ADMIN — DEXAMETHASONE SODIUM PHOSPHATE 4 MG: 4 INJECTION, SOLUTION INTRAMUSCULAR; INTRAVENOUS at 13:15

## 2025-06-23 RX ADMIN — EPHEDRINE SULFATE 10 MG: 50 INJECTION INTRAVENOUS at 12:05

## 2025-06-23 RX ADMIN — SUGAMMADEX 100 MG: 100 INJECTION, SOLUTION INTRAVENOUS at 13:50

## 2025-06-23 RX ADMIN — Medication 100 MCG: at 12:08

## 2025-06-23 RX ADMIN — PROPOFOL 30 MG: 10 INJECTION, EMULSION INTRAVENOUS at 13:32

## 2025-06-23 RX ADMIN — Medication 100 MCG: at 10:07

## 2025-06-23 RX ADMIN — EPHEDRINE SULFATE 5 MG: 50 INJECTION INTRAVENOUS at 13:15

## 2025-06-23 RX ADMIN — DEXMEDETOMIDINE 10 MCG: 100 INJECTION, SOLUTION, CONCENTRATE INTRAVENOUS at 08:35

## 2025-06-23 RX ADMIN — PROPOFOL 120 MG: 10 INJECTION, EMULSION INTRAVENOUS at 07:42

## 2025-06-23 RX ADMIN — EPHEDRINE SULFATE 10 MG: 50 INJECTION INTRAVENOUS at 09:33

## 2025-06-23 RX ADMIN — KETOROLAC TROMETHAMINE 30 MG: 30 INJECTION, SOLUTION INTRAMUSCULAR; INTRAVENOUS at 08:08

## 2025-06-23 RX ADMIN — Medication 100 MCG: at 10:26

## 2025-06-23 RX ADMIN — LIDOCAINE HYDROCHLORIDE 80 MG: 20 INJECTION, SOLUTION INTRAVENOUS at 07:42

## 2025-06-23 RX ADMIN — EPHEDRINE SULFATE 10 MG: 50 INJECTION INTRAVENOUS at 10:06

## 2025-06-23 RX ADMIN — Medication 100 MCG: at 12:10

## 2025-06-23 RX ADMIN — DEXAMETHASONE SODIUM PHOSPHATE 4 MG: 4 INJECTION, SOLUTION INTRAMUSCULAR; INTRAVENOUS at 07:52

## 2025-06-23 RX ADMIN — EPHEDRINE SULFATE 10 MG: 50 INJECTION INTRAVENOUS at 10:07

## 2025-06-23 RX ADMIN — EPHEDRINE SULFATE 10 MG: 50 INJECTION INTRAVENOUS at 09:34

## 2025-06-23 RX ADMIN — MIDAZOLAM HYDROCHLORIDE 2 MG: 1 INJECTION, SOLUTION INTRAMUSCULAR; INTRAVENOUS at 07:29

## 2025-06-23 RX ADMIN — ONDANSETRON 4 MG: 2 INJECTION INTRAMUSCULAR; INTRAVENOUS at 07:29

## 2025-06-23 RX ADMIN — ALBUMIN (HUMAN): 12.5 INJECTION, SOLUTION INTRAVENOUS at 09:36

## 2025-06-23 RX ADMIN — Medication 100 MCG: at 12:23

## 2025-06-23 RX ADMIN — ROCURONIUM BROMIDE 20 MG: 10 INJECTION, SOLUTION INTRAVENOUS at 10:27

## 2025-06-23 RX ADMIN — SODIUM CHLORIDE, POTASSIUM CHLORIDE, SODIUM LACTATE AND CALCIUM CHLORIDE: 600; 310; 30; 20 INJECTION, SOLUTION INTRAVENOUS at 07:35

## 2025-06-23 RX ADMIN — DEXMEDETOMIDINE 10 MCG: 100 INJECTION, SOLUTION, CONCENTRATE INTRAVENOUS at 08:30

## 2025-06-23 RX ADMIN — Medication 100 MCG: at 12:12

## 2025-06-23 RX ADMIN — PROPOFOL 30 MG: 10 INJECTION, EMULSION INTRAVENOUS at 13:05

## 2025-06-23 RX ADMIN — Medication 100 MCG: at 11:34

## 2025-06-23 RX ADMIN — Medication 200 MCG: at 13:15

## 2025-06-23 RX ADMIN — EPHEDRINE SULFATE 10 MG: 50 INJECTION INTRAVENOUS at 09:32

## 2025-06-23 NOTE — OP NOTE
BREAST REDUCTION BILATERAL  Procedure Report    Patient Name:  Vanessa Giang  YOB: 1980    Date of Surgery:  6/23/2025       Procedure: Bilateral breast reduction, biologic mesh placement x 2 and SPY angiography       Pre-op Diagnosis:   Breast hypertrophy in female [N62]    Post-Op Diagnosis Codes:     * Breast hypertrophy in female [N62]     Procedure(s):  BREAST REDUCTION BILATERAL, SPY angiography, MESH         Staff:  Surgeon(s):  Kerry Henley MD    Assistant: Abi Whatley RN CSA    Anesthesia: General    Estimated Blood Loss: 200 mL    Implants:    Implant Name Type Inv. Item Serial No.  Lot No. LRB No. Used Action   CLIPAPPLR M/ ENDO LIGACLIP 20CLP 11IN MD - [id number] Implant CLIPAPPLR M/ ENDO LIGACLIP 20CLP 11IN MD  [facility]  [facility]  [id number]  2 Implanted   CLIPAPPLR M/ ENDO LIGACLIP 20CLP 11IN MD - [id number] Implant CLIPAPPLR M/ ENDO LIGACLIP 20CLP 11IN MD  [employer] [employer]  [employer]  AND  [id number]  1 Implanted   ALLOGRFT DERM CORTIVA 1MM [id number] - [id number] Implant ALLOGRFT DERM CORTIVA 1MM [id number]  [employer] [id number] Left 1 Implanted   ALLOGRFT DERM CORTIVA 1MM [id number] - [id number] Implant ALLOGRFT DERM CORTIVA 1MM [id number]  [employer] [id number] Right 1 Implanted   SUT CONTRL TISS STRATAFIX SPIRAL PDS PLS SH1 3/0 20CM - [id number] Implant SUT CONTRL TISS STRATAFIX SPIRAL PDS PLS SH1 3/0 20CM  [employer]  DIV OF  AND  [id number] Left 5 Implanted       Specimen:          Specimens       ID Source Type Tests Collected By Collected At Frozen?    A Breast, Left Tissue TISSUE PATHOLOGY EXAM   Kerry Henley MD 6/23/25 1239 No    Description: left breast    Comment: 753g  Fresh    B Breast, Right Tissue TISSUE PATHOLOGY EXAM   Kerry Henley MD 6/23/25 1238 No    Description: right breast    Comment: Weight: 764g   Fresh            Findings: multiple benign appearing cysts      Complications: none       Kerry Henley MD     Date: 6/23/2025  Time: 14:17 EDT      Indications: Patient is a 45 yo woman with history of massive weigh loss who presented to me with many year history of neck pain, shoulder pain and recurrent rashes under her breasts.  We discussed the details and possible complications associated with bilateral breast reduction mammoplasty.  We also discussed that due to long sternum to nipple distance, long IMF- N distance, poor skin quality due to weight loss and history of Cushing syndrome the recommendation was to proceed with inferior pedicle breast reduction with insertion of acellular dermal matrix for tissue reinforcement and help result in longer lasting perkier breast shape  Due to large breasts and expected large breast reduction plan to perform spy angiography to evaluate perfusion to NAC. We thoroughly discussed the possibility of free nipple graft if there was any question regarding blood flow to nipple areolar complex.  Patient expressed understanding and informed consent was signed.  Patient presents today for surgical intervention denies any changes in her health.     Patient has multiple allergies to medications and environmental. She is also highly sensitive to narcotics. She was given tramadol for post-op pain control. She will take scheduled tylenol. She was given baclofen for muscle spasms as she has allergy to flexeril. She was given Zofran as needed for post-op nausea as needed.      Description of Procedure:   Patient was seen in preoperative holding area and appropriate preoperative markings were made.  The new nipple-areolar complex position was marked at the IMF level.  I outlined Helton pattern skin incision and vertical limbs were marked at 6 cm.  I also tentatively outlined the inferior pedicle with the base measuring 11 cm.  She has grade 3 ptosis with both nipple-areolar complex at  inferior aspect of her breasts.    Patient was then taken  to the operating room and placed in supine position.  General tracheal anesthesia was induced and preoperative Ancef was administered.  SCDs were placed. Timeout was performed and I injected 10 mL of 1% lidocaine with epinephrine along the intended skin incision with the exception of the base of the inferior pedicle.      I began with the right breast reduction.  Using a lap pad a tourniquet was applied at the base of the breast.  Putting tissue on stretch I used 42 mm cookie cutter to outline the new nipple-areolar complex border.  Maintaining tissues on stretched I made superficial incision along the outline of the nipple-areolar complex through epidermis into the dermis, but not through the dermis, and along the outlined proposed inferior pedicle.  I then de-epithelialized the entirety of the inferior pedicle with a 10 blade.  Using electrocautery and supporting the lateral breast I deepened the dissection through the dermis into the subcutaneous tissues around the inferior pedicle keeping perpendicular orientation to the chest wall to help preserve intercostal perforators.  Breast tissue was divided to the level of the pectoralis fashion to isolate inferior pedicle.  Care was taken to not undermine the pedicle.  I then mobilized breast tissue superiorly medially and laterally just above the pectoralis fascia taking care not to violate pectoralis fascia.   Hemostasis throughout the operation was ensured with electrocautery and hemoclips.  The length of the inferior pedicle measured 17 cm in length and 11 cm in width.  To decrease the distance between the IMF and inferior border of the areola a 4 cm area of the pedicle was marked out and imbricated with running 3-0 Vicryl suture.  To further decrease the width of the pedicle an additional 4 cm area of the pedicle was imbricated with running 3-0 Vicryl. I then placed 1 interrupted retention suture at the superior medial aspect of the inferior pedicle approximating  Alisha's layer to pectoralis fascia in the superior medial chest, superior lateral breast as well as medially and laterally.  Once this was done the wound was checked for hemostasis.  The wound was temporarily covered with wet lap pad and the same procedure was performed on the left.      Comparing the size of the pedicle on both sides I performed additional tissue excision from both breasts to the appropriate specimens.  Once this was done and hemostasis was ensured on both sides spy angiography was performed.  Patient received 3 mL of ICG green intravenously.  Using a hand-held probe the perfusion to the tissue on both sides was performed.  Bilateral nipple areolar complex were viable and well-perfused.  I performed very minor additional breast tissue excision that had delayed perfusion on spy angiography with Gorney scissors.  The excised tissue from each side was added to the appropriate specimen.     I then proceeded with mesh insertion on the right.  A sheet of Cortiva acellular dermal matrix measuring 16 cm x 20 cm and 1 mm thickness was thoroughly hydrated on the back table.  It was then brought into the field and draped over the inferior pedicle.  I then secured the mesh to the chest wall with multiple interrupted 2-0 PDS sutures and along the IMF level.  The position for nipple-areolar complex was marked and part of the mesh was excised to accommodate protrusion of the nipple-areolar complex.  The same procedure was performed on the left.  I then checked my previously outlined Helton pattern incisions and no additional adjustments were needed.  I further proceeded with the operation on the right side.  I made skin incision along the prior Helton pattern uribe with 10 blade and deepened the dissection through subcutaneous tissues with electrocautery.  The specimen was passed passed off the field to be added to be weighed with previous exercise skin and breast tissue.  The same procedure was performed on the  left.  The wounds were irrigated and checked for hemostasis.  Bilateral wounds were then temporarily stapled with staples and patient was sat up to 90 degrees.  This showed reasonable symmetry and    I marked out additional skin excision to better accommodate nipple-areolar complex position bilaterally.  Minor skin cuff was excised bilaterally and added to the appropriate specimen.       The patient was then laid supine and all staples were removed.  The wound was irrigated and checked for hemostasis.  Additional hemostasis was achieved with electrocautery and hemoclips.  I then injected 15 mL 0.25%Marcaine for pec block and along the lateral chest.  15 Greek Andre drain was placed and secured to the chest wall with 3-0 nylon suture. Once hemostasis was ensured bilateral wounds were temporarily stapled with staples.  The same procedure was performed bilaterally.    I repeated spy angiography and this showed delayed perfusion to right inferomedial skin near T-junction.  This was the area that was previously injected with lidocaine and epinephrine.  On exam it appeared bruised.  The skin had healthy bleeding edges.  Due to concern of possible delayed perfusion I just my closing technique, see below for details.     The patient was set up to 90 degrees to check for symmetry.  This showed good breast size and shape symmetry bilaterally.  I checked with caliper as well as hemostasis and silk stitch the distance between the sternal notch to the superior border of the areola, sternal notch to the nipple, midline to medial aspect of the nipple as well as lateral chest anterior axillary line to the lateral border of the nipple and all showed good symmetry.  The new IMF to inferior border of the nipple distance measured 6 cm bilaterally.  Midline to medial border of the areola measured 10 cm bilaterally.  The nipple areolar complex diameter measured 4.2 cm bilaterally.   The patient was laid supine and a proceeded with wound  closure on the right.  The Alisha's layer laterally was closed with 3.0 Vicryl stitch approximating Alisha's IMF layer to lateral breast tissue and underlying chest wall and serratus fascia.  Medially to the inferior pedicle Alisha's layer was approximated with 2 interrupted 0 Vicryl suture.  Incisions at the T-junction was closed with several interrupted deep dermal 3 Vicryl.  The skin edges were further approximated with several interrupted 4 nylon followed by running 4 nylon baseball stitch approximately made in the skin edges at the level of the inferior pedicle.  The lateral aspect of the IMF incision was closed with running 3 oh STRATAFIX.  The medial aspect of the IMF incision was closed with multiple interrupted deep dermal 3-0 Vicryl. The vertical limb was closed with multiple interrupted deep dermal 3-0 Monocryl.  The circumareolar incision was closed with multiple interrupted deep dermal 3-0 Monocryl.  The circumareolar and vertical limb was further closed with running subcuticular 4-0 Monocryl.  The medial aspect of the IMF incision was further closed with running subcuticular 4 Monocryl.  Several areas of skin puckering on the lateral chest and circumareola incision were approximated with several simple interrupted 5-0 chromic sutures.   The same closure was performed on the left.    Total tissue removed from the right 764 g and total tissue removed from the left was 753 g.  All specimens were sent to pathology.  Bilateral nipple areolar complex remained warm and well-perfused.  All wounds were cleaned.  Circumareolar, vertical and IMF medial and lateral aspects of the incision were covered with Mastisol and Steri-Strips.  The central portion of the IMF incision was covered with Xeroform strip.      The IMF incisions were covered with Primapore dressing.  The drain sites were dressed with Biopatch and Primapore dressing.  The nipple-areolar complex and lateral chest were padded with ABD pads and a soft  surgical bra was applied.      Due to length of the procedure patient was straight cath at the end of the procedure.  She was redosed 1 g of Ancef during the operation at 4-hour chuy.  Patient tolerated the procedure well.  She was taken to recovery for further monitoring.  I then spoke to the patient's  and updated him regarding intraoperative findings, went over expected postoperative striction's and answered all their questions.        Complications: none     Assistant: Abi Whatley RN CSA was responsible for performing the following activities: Retraction, Suction, Irrigation, Suturing, Closing, and Placing Dressing and their skilled assistance was necessary for the success of this case.     Kerry Henley MD

## 2025-06-23 NOTE — ANESTHESIA POSTPROCEDURE EVALUATION
Patient: Vanessa Giang    Procedure Summary       Date: 06/23/25 Room / Location: [facility] OR 05 / [facility] MAIN OR    Anesthesia Start: 0735 Anesthesia Stop: 1410    Procedure: BREAST REDUCTION BILATERAL, SPY angiography, MESH (Bilateral: Chest) Diagnosis:       Breast hypertrophy in female      (Breast hypertrophy in female [N62])    Surgeons: Kerry Henley MD Provider: Pk Sotelo MD    Anesthesia Type: general ASA Status: 3            Anesthesia Type: general    Vitals  Vitals Value Taken Time   BP 93/50 06/23/25 14:52   Temp 37.3 °C (99.1 °F) 06/23/25 14:07   Pulse 70 06/23/25 14:54   Resp 14 06/23/25 14:27   SpO2 95 % 06/23/25 14:54   Vitals shown include unfiled device data.        Post Anesthesia Care and Evaluation    Patient location during evaluation: bedside  Patient participation: complete - patient participated  Level of consciousness: awake    Airway patency: patent  PONV Status: none  Cardiovascular status: acceptable  Respiratory status: acceptable  Hydration status: acceptable

## 2025-06-23 NOTE — DISCHARGE INSTRUCTIONS
DISCHARGE INSTRUCTIONS  BREAST SURGERY           Breast Reduction Post-Operative Instructions    What are my post-operative instructions?   - Have someone drive you home after surgery and help you at homes for 1-2 days.   - Get plenty of rest and follow a balanced diet.   - Constipation is a side effect of decreased activity, use of pain medicines, and dehydration. Be sure to walk and drink fluids regularly.   - Take tool softeners if taking narcotics even if you don't have a history of constipations.  - Resume home medications, unless instructed otherwise.   - Do not some of be around a smoker. This can be the most significant cause of serious healing issues.     What should my activity level be?   - Start walking as soon as possible - this helps to reduce swelling, lower the chance of developing blood clots and/or pneumonia, and avoid constipation.   - Do not drive until you have full range of motion with your arms and can stop the car or swerve in an emergency.   - Strenuous exercise and activities are restricted for 6 weeks.   - Avoid lifting anything over 10 pounds for 6 weeks  - Resume sexual activity as comfort permits, usually 4-5 weeks postoperatively.   - Return to work in 6 weeks, sooner if not labor intensive.   - Sleep with head slightly elevated and pillows under your knees to decrease tension on your incision. Think beach chair or recliner position. If you don't bend your knees, it will hurt your lower back.     How do I take care of my incision?   - You may shower 72 hours after surgery even with drains in place, but do not let drains dangle.   - Avoid exposing scars to the sun for at least 6 months.   - Always use a strong sunblock (SPF 50 or greater), if sun exposure is unavoidable 6 weeks AFTER surgery, once fully healed.   - You do not need to apply and special creams or ointment over incisions for the first 6 weeks after surgery.   - Keep incisions clean and inspect daily for signs of  infection.   - No bathtubs, hot tubs, or swimming pools for 6 weeks following surgery.   - Wear your surgical bra 24/7 as directed for 6 weeks post of, but take 15-20 min breaks 2-3 times daily.   - Place soft dressing over incisions and do not let garments rub directly on incisions. It will help wick away moisture and to prevent irritation by garment along the incision.     What should I expect to feel?   - You may experience temporary soreness, bruising, swelling, and tightness as well as discomfort in the incision area.   - Most of the higher discomfort will subside after the first few days.   - You will have bruising and swelling of the breasts. The majority of bruising and swelling will subside in 6-8 weeks.   - You may have more discomfort in one breast over the other and it is normal.   - Expect more pain at the drain sites if you have drains.   - You may feel tired for several weeks.     What will it look like?   - Keep steri-strips on. If your incision was closed with skin glue, it will slowly flake off over the first couple of weeks.   - Scars will be reddish for several months up to a year, but will fade and soften over time.     What follow-up care will I receive?   - First follow-up will be in 1-2 weeks after surgery. Additional follow-up appointment for drain removal will be scheduled as needed.     When should I call my doctor?   - If you have increased swelling or bruising.   - If swelling and redness persists for a few days.   - If you have increased redness along the incision.   - If you have severe or increase pain not relieved by medication.   - If you have any side effects to medications, such as, rash, nausea, headache, vomiting, or constipation  - If you have an oral temperature over 100.4 degrees.  - If you have any yellowing or greenish drainage from the incisions or notice a foul odor.     How do I manage my pain?  Pre-operative instructions:   - Prior to your surgery, narcotic pain  medication and muscle relaxants will be prescribed to your pharmacy.   - Take scheduled Tylenol and ibuprofen on scheduled basis. Then Take narcotics and muscle relaxants as needed for additional pain control.   - Take medications with small snack to avoid nausea.   - Take Zofran as needed for nausea.     Post-Operative instructions:   - After having surgery, it is expected you will experience some pain even with pain medications. This is a normal part of recovery.   - You may not drive while you are taking narcotic pain medication or otherwise instructed by your surgical team.         DISCHARGE INSTRUCTIONS  SURGICAL / AMBULATORY  PROCEDURES      For your surgery you had:  General anesthesia (you may have a sore throat for the first 24 hours)  Monitored Anesthesia Care (MAC)  May remove nausea patch in 24-72 hours.    You may experience dizziness, drowsiness, or light-headedness for several hours following surgery/procedure.    Do not stay alone today or tonight.  Limit your activity for 24 hours.  Resume your diet slowly.  Follow whatever special dietary instructions you may have been given by your doctor.  You should not drive or operate machinery, drink alcohol, or sign legally binding documents for 24 hours or while you are taking pain medication.    NOTIFY YOUR DOCTOR IF YOU EXPERIENCE ANY OF THE FOLLOWING:  Temperature greater than 101 degrees Fahrenheit  Shaking Chills  Redness or excessive drainage from incision  Nausea, vomiting and/or pain that is not controlled by prescribed medications  Increase in bleeding or bleeding that is excessive  Unable to urinate in 6 hours after surgery  If unable to reach your doctor, please go to the closest Emergency Room  Medications per physician instructions as indicated on After Visit Summary.    Pain Medication Last Given:  Tylenol 1000 mg given at 7:28. DO not exceed 4000 mg in a 24 hour period.  Toradol given at 8:08.        SPECIAL INSTRUCTIONS:

## 2025-06-23 NOTE — INTERVAL H&P NOTE
H&P reviewed. The patient was examined and there are no changes to the H&P.    Patient elected to have mesh placed with breast reduction.

## 2025-06-23 NOTE — ANESTHESIA PREPROCEDURE EVALUATION
Anesthesia Evaluation     Patient summary reviewed and Nursing notes reviewed   no history of anesthetic complications:   NPO Solid Status: > 8 hours  NPO Liquid Status: > 2 hours           Airway   Mallampati: II  TM distance: >3 FB  Neck ROM: full  No difficulty expected  Dental      Pulmonary - normal exam    breath sounds clear to auscultation  (+) ,sleep apnea  Cardiovascular - normal exam    Rhythm: regular  Rate: normal    (+) valvular problems/murmurs MR, hyperlipidemia    ROS comment: Echo and stress 2 years ago largely unremarkable    Neuro/Psych  (+) psychiatric history Anxiety  GI/Hepatic/Renal/Endo    (+) obesity, GERD, liver disease fatty liver disease, thyroid problem hypothyroidism    Musculoskeletal     Abdominal    Substance History      OB/GYN          Other   arthritis,                       Anesthesia Plan    ASA 3     general     intravenous induction     Anesthetic plan, risks, benefits, and alternatives have been provided, discussed and informed consent has been obtained with: patient.  Pre-procedure education provided      CODE STATUS:

## 2025-06-24 LAB
CYTO UR: NORMAL
LAB AP CASE REPORT: NORMAL
LAB AP CLINICAL INFORMATION: NORMAL
PATH REPORT.FINAL DX SPEC: NORMAL
PATH REPORT.GROSS SPEC: NORMAL

## 2025-06-25 LAB
QT INTERVAL: 450 MS
QTC INTERVAL: 407 MS

## 2025-06-26 ENCOUNTER — TELEPHONE (OUTPATIENT)
Dept: PLASTIC SURGERY | Facility: CLINIC | Age: 45
End: 2025-06-26
Payer: MEDICAID

## 2025-06-26 NOTE — TELEPHONE ENCOUNTER
Rcvd call from pt stating today is the 3rd day and she can take a shower. Pt wanted to confirm if she is able to remove the bandages to shower. Per  she is to remove everything but the steri strips and after shower pat herman and cover with ABD pads.

## 2025-07-01 ENCOUNTER — OFFICE VISIT (OUTPATIENT)
Dept: PLASTIC SURGERY | Facility: CLINIC | Age: 45
End: 2025-07-01
Payer: MEDICAID

## 2025-07-01 VITALS
BODY MASS INDEX: 29.89 KG/M2 | SYSTOLIC BLOOD PRESSURE: 121 MMHG | OXYGEN SATURATION: 97 % | HEART RATE: 64 BPM | TEMPERATURE: 97.3 F | WEIGHT: 186 LBS | HEIGHT: 66 IN | DIASTOLIC BLOOD PRESSURE: 73 MMHG

## 2025-07-01 DIAGNOSIS — L98.7 EXCESSIVE AND REDUNDANT SKIN AND SUBCUTANEOUS TISSUE: ICD-10-CM

## 2025-07-01 DIAGNOSIS — R21 RASH: ICD-10-CM

## 2025-07-01 DIAGNOSIS — N62 BREAST HYPERTROPHY IN FEMALE: Primary | ICD-10-CM

## 2025-07-01 DIAGNOSIS — N64.81 PTOSIS OF BREAST: ICD-10-CM

## 2025-07-01 PROCEDURE — 99024 POSTOP FOLLOW-UP VISIT: CPT | Performed by: SURGERY

## 2025-07-01 PROCEDURE — 1160F RVW MEDS BY RX/DR IN RCRD: CPT | Performed by: SURGERY

## 2025-07-01 PROCEDURE — 1159F MED LIST DOCD IN RCRD: CPT | Performed by: SURGERY

## 2025-07-08 NOTE — PROGRESS NOTES
"Chief Complaint  Post-op Follow-up (3w post op)    Subjective          Initial evaluation  Vanessa Giang is a 44 y.o. female who presents to [facility] for Postoperative Follow-Up of BREAST REDUCTION BILATERAL, SPY angiography, MESH 6/23/25.  A total of 764 g of tissue was removed from the right and a total of 753 g of tissue was removed from the left. Pathology showed benign findings.   One drain was left on each side and patient was given soft surgical bra.     History of Present Illness      7/9/25-pt presents today for 3w post op.  Both drains removed at last visit. She denies signs of seroma. Swelling and bruising improved. She states darker small triangle of skin discoloration at left breast t-junction remains stable.   Denies fluctuance, redness or drainage. Denies wound breakdown. She feels she has a knot in left lateral chest and wondering why her left lateral chest is not as smooth as the right one.   She is happy with her results. She likes the size and symmetry. She now is more bothered by the appearance of her abdomen and is already thinking about panniculectomy.   She has been padding her breasts with ABD pads.  No chest pain or SOB. No fevers or chills.       Allergies: Cocamidopropyl betaine, Linalool, Nickel, Blue dye #2 (indigo carmine), Adhesive tape, Avocado, Bee pollen, Benzalkonium, Benzalkonium chloride, Roebling, Roebling red [red dye #40 (allura red)], Formaldehyde, Gold, Latex, Lavender oil, Other, Other food allergy, Propolis, Quaternium-15, and Tea tree oil  Allergies Reconciled.    Review of Systems   All review of system has been reviewed and it  is negative except the ones note above.     Objective     /82 (BP Location: Left arm, Patient Position: Sitting, Cuff Size: Adult)   Pulse (!) 34   Ht 167.6 cm (65.98\")   Wt 84.8 kg (187 lb)   SpO2 99%   BMI 30.20 kg/m²     Body mass index is 30.2 kg/m².            Physical exam: "   General: well appearing woman in NAD  CV: RR  Lungs: breathing comfortably on RA  Breast: improved bilateral breast shape and contour with good symmetry, improved post-operative edema and bruising, no signs of seroma or hematoma, bilateral NAC viable and in good position, circumareolar incisions  intact and healing well,  central part of IMF incision Nylon stitches removed and incision healing well, left breast t-junction with small triangle 1 cm x 1 cm x1 cm with darker skin discoloration, but no evidence of wound breakdown or open wounds, no drainage, improved lateral chest contour with decreased excess skin and sub-q tissue, more excess lateral chest skin on the left compared to the right, bilateral drains sites well healed  Extremities: no peripheral edema  Neuro: intact          Result Review :   The following data was reviewed by: Kerry Henley MD on 07/09/2025:    Pathology results:     Final Diagnosis   1. Left breast, reduction mammoplasty:               - Unremarkable skin and breast tissue with proliferative fibrocystic change, 798 grams        2. Right breast, reduction mammoplasty:               - Unremarkable skin and breast tissue with proliferative fibrocystic change and fibroadenomatoid change, 808 grams                Assessment and Plan      Diagnoses and all orders for this visit:    1. Breast hypertrophy in female (Primary)    2. Ptosis of breast    3. Rash    4. Excessive and redundant skin and subcutaneous tissue        Patient is a 43 yo F with history of macromastia s/p bilateral breast reduction with mesh placement and SPY angiography on 6/23/25.    She is here for 3 week post-op f/u and is doing well. Both NAC viable and in good position. Incisions healing well.     Small triangle of darker skin discoloration at left breast T-junction is stable but consistent with superficial skin necrosis.  Bilateral central IMF nylon stitches were removed today and at that time I also deroofed  the area of skin discoloration, but underlying tissues appear to be viable.    She has great symmetry with improved post-op bruising and edema.    -- 3 week post-op photo obtained in the room today  -- Left breast t-junction: Continue to apply Vaseline twice daily and pad with ABD pads at all times  -- Nylon stitches removed today, apply thin layer of Vaseline over all incisions and skin twice daily to prevent dry skin or scabbing   -- Apply Vaseline over incision line not covered by steri strips, apply Vaseline over both NAC for dry skin   -- Continue to wear surgical bra at all times expect showers and pad all incisions with ABD pads so the bra never rubs against incisions. You can take 15-20 min breaks from the bra couple of times per day when laying down and resting   -- Continue to follow activity and weight lifting restrictions  -- Continue to sleep in beach chair position  -- You can wash your hair, wipe your bottom and put a shirt on, but no big pulls or pushes with your arms for 6 weeks  -- No strenuous activities for 6 weeks  -- F/u in 3 weeks post-op f/u                      Follow Up     No follow-ups on file.    Patient was given instructions and counseling regarding her condition. Please see specific information pulled into the AVS if appropriate.     Kerry Henley MD  07/09/2025    Answers submitted by the patient for this visit:  Post Operative Visit (Submitted on 6/30/2025)  Chief Complaint: Follow-up  Pain Control: controlled  Fever: unmeasured  Diet: adequate intake  Activity: returning to normal  Operative Site Issues: No    Answers submitted by the patient for this visit:  Post Operative Visit (Submitted on 7/8/2025)  Chief Complaint: Follow-up  Pain Control: controlled  Fever: no fever  Diet: adequate intake  Activity: returning to normal  Operative Site Issues: Yes  Drainage: none  Redness: none  Swelling: improved  Operative site comments:: Knot under incision under left arm  Additional  information: Have been more sore the last few days with muscle stiffness.

## 2025-07-09 ENCOUNTER — OFFICE VISIT (OUTPATIENT)
Dept: PLASTIC SURGERY | Facility: CLINIC | Age: 45
End: 2025-07-09
Payer: MEDICAID

## 2025-07-09 VITALS
HEART RATE: 34 BPM | SYSTOLIC BLOOD PRESSURE: 138 MMHG | DIASTOLIC BLOOD PRESSURE: 82 MMHG | BODY MASS INDEX: 30.05 KG/M2 | OXYGEN SATURATION: 99 % | WEIGHT: 187 LBS | HEIGHT: 66 IN

## 2025-07-09 DIAGNOSIS — N62 BREAST HYPERTROPHY IN FEMALE: Primary | ICD-10-CM

## 2025-07-09 DIAGNOSIS — L98.7 EXCESSIVE AND REDUNDANT SKIN AND SUBCUTANEOUS TISSUE: ICD-10-CM

## 2025-07-09 DIAGNOSIS — N64.81 PTOSIS OF BREAST: ICD-10-CM

## 2025-07-09 DIAGNOSIS — R21 RASH: ICD-10-CM

## 2025-07-09 PROCEDURE — 99024 POSTOP FOLLOW-UP VISIT: CPT | Performed by: SURGERY

## 2025-07-10 ENCOUNTER — OFFICE VISIT (OUTPATIENT)
Dept: PLASTIC SURGERY | Facility: CLINIC | Age: 45
End: 2025-07-10
Payer: MEDICAID

## 2025-07-10 VITALS
OXYGEN SATURATION: 100 % | DIASTOLIC BLOOD PRESSURE: 75 MMHG | HEIGHT: 66 IN | WEIGHT: 187 LBS | TEMPERATURE: 98.7 F | SYSTOLIC BLOOD PRESSURE: 131 MMHG | BODY MASS INDEX: 30.05 KG/M2 | HEART RATE: 64 BPM

## 2025-07-10 DIAGNOSIS — N62 BREAST HYPERTROPHY IN FEMALE: Primary | ICD-10-CM

## 2025-07-10 DIAGNOSIS — L98.7 EXCESSIVE AND REDUNDANT SKIN AND SUBCUTANEOUS TISSUE: ICD-10-CM

## 2025-07-10 DIAGNOSIS — R21 RASH: ICD-10-CM

## 2025-07-10 DIAGNOSIS — N64.81 PTOSIS OF BREAST: ICD-10-CM

## 2025-07-10 PROCEDURE — 99024 POSTOP FOLLOW-UP VISIT: CPT | Performed by: SURGERY

## 2025-07-10 NOTE — PROGRESS NOTES
Chief Complaint  Post-op Follow-up (Suture removal)    Subjective          Initial evaluation  Vanessa Giang is a 44 y.o. female who presents to [facility] for Postoperative Follow-Up of BREAST REDUCTION BILATERAL, SPY angiography, MESH 6/23/25.  A total of 764 g of tissue was removed from the right and a total of 753 g of tissue was removed from the left. Pathology showed benign findings.   One drain was left on each side and patient was given soft surgical bra.     History of Present Illness    07/10/2025: Patient was seen yesterday and all nylon stitches were removed.  She presents today because she feels there is 3 more stitches on the lateral aspect of her incisions that she felt when she was applying Vaseline along the incisions.  She has no other concerns    07/09/2025: pt presents today for 3w post op.  Both drains removed at last visit. She denies signs of seroma. Swelling and bruising improved. She states darker small triangle of skin discoloration at left breast t-junction remains stable.   Denies fluctuance, redness or drainage. Denies wound breakdown. She feels she has a knot in left lateral chest and wondering why her left lateral chest is not as smooth as the right one.   She is happy with her results. She likes the size and symmetry. She now is more bothered by the appearance of her abdomen and is already thinking about panniculectomy.   She has been padding her breasts with ABD pads.  No chest pain or SOB. No fevers or chills.       Allergies: Cocamidopropyl betaine, Linalool, Nickel, Blue dye #2 (indigo carmine), Adhesive tape, Avocado, Bee pollen, Benzalkonium, Benzalkonium chloride, [place], [place] red [red dye #40 (allura red)], Formaldehyde, Gold, Latex, Lavender oil, Other, Other food allergy, Propolis, Quaternium-15, and Tea tree oil  Allergies Reconciled.    Review of Systems   All review of system has been reviewed and it  is negative  "except the ones note above.     Objective     /75 (BP Location: Left arm, Patient Position: Sitting, Cuff Size: Adult)   Pulse 64   Temp 98.7 °F (37.1 °C) (Temporal)   Ht 167.6 cm (65.98\")   Wt 84.8 kg (187 lb)   SpO2 100%   BMI 30.20 kg/m²     Body mass index is 30.2 kg/m².            Physical exam:   General: well appearing woman in NAD  CV: RR  Lungs: breathing comfortably on RA  Breast: improved bilateral breast shape and contour with good symmetry, improved post-operative edema and bruising, no signs of seroma or hematoma, bilateral NAC viable and in good position, circumareolar incisions  intact and healing well,  left breast t-junction with small triangle 1 cm x 1 cm x1 cm with skin necrosis, but no drainage, remainder of vertical and IMF incisions intact, bilateral lateral chest most distal aspect at the incisions with single external 5-0 chromic suture,  improved lateral chest contour with decreased excess skin and sub-q tissue, more excess lateral chest skin on the left compared to the right, bilateral drains sites well healed  Extremities: no peripheral edema  Neuro: intact          Result Review :   The following data was reviewed by: Kerry Henley MD on 07/09/2025:    Pathology results:     Final Diagnosis   1. Left breast, reduction mammoplasty:               - Unremarkable skin and breast tissue with proliferative fibrocystic change, 798 grams        2. Right breast, reduction mammoplasty:               - Unremarkable skin and breast tissue with proliferative fibrocystic change and fibroadenomatoid change, 808 grams                Assessment and Plan      Diagnoses and all orders for this visit:    1. Breast hypertrophy in female (Primary)    2. Excessive and redundant skin and subcutaneous tissue    3. Ptosis of breast    4. Rash          Patient is a 45 yo F with history of macromastia s/p bilateral breast reduction with mesh placement and SPY angiography on 6/23/25.    She is here " for 3 week post-op f/u and is doing well. Both NAC viable and in good position. Incisions healing well.     Small triangle of darker skin discoloration at left breast T-junction is stable but consistent with superficial skin necrosis.  Bilateral central IMF nylon stitches were removed today and at that time I also deroofed the area of skin discoloration, but underlying tissues appear to be viable.    She has great symmetry with improved post-op bruising and edema.    --Patient had nylon stitches removed yesterday.  I saw the external interrupted 5-0 chromic suture on the most distal aspect of bilateral lateral chest incisions and elected to keep that in place yesterday.  However this was concerning to the patient prompting her to present today for stitches removal.  Overall she is healing well and elected to remove the stitches today.  Patient will otherwise continue to follow her instructions.  She is doing well.    -- 3 week post-op photo obtained in the room yesterday  -- Left breast t-junction: Continue to apply Vaseline twice daily and pad with ABD pads at all times  -- Nylon stitches removed today, apply thin layer of Vaseline over all incisions and skin twice daily to prevent dry skin or scabbing   -- Apply Vaseline over incision line not covered by steri strips, apply Vaseline over both NAC for dry skin   -- Continue to wear surgical bra at all times expect showers and pad all incisions with ABD pads so the bra never rubs against incisions. You can take 15-20 min breaks from the bra couple of times per day when laying down and resting   -- Continue to follow activity and weight lifting restrictions  -- Continue to sleep in beach chair position  -- You can wash your hair, wipe your bottom and put a shirt on, but no big pulls or pushes with your arms for 6 weeks  -- No strenuous activities for 6 weeks  -- F/u in 3 weeks post-op f/u                      Follow Up     No follow-ups on file.    Patient was given  instructions and counseling regarding her condition. Please see specific information pulled into the AVS if appropriate.     Kerry Henley MD  07/10/2025

## 2025-07-16 ENCOUNTER — TELEPHONE (OUTPATIENT)
Dept: PLASTIC SURGERY | Facility: CLINIC | Age: 45
End: 2025-07-16
Payer: MEDICAID

## 2025-07-16 NOTE — TELEPHONE ENCOUNTER
Mode of Visit: Audio  Location of patient: home  Patient consents to use an audio connection for medical care today.  The visit included audio interaction. No technical issues occurred during this visit.  Length of visit: 5 minutes       I called the patient about her wound. I explained that wounds like that at the T junction on breast reductions are frequent. She should clean the wound with Hibiclens and then place vaseline on the wound. If a scab is formed we will address that in her post op  visit.     Paco Deras MD, PhD  Plastic and reconstructive surgery\

## 2025-08-05 ENCOUNTER — OFFICE VISIT (OUTPATIENT)
Dept: PLASTIC SURGERY | Facility: CLINIC | Age: 45
End: 2025-08-05
Payer: MEDICAID

## 2025-08-05 VITALS
WEIGHT: 181 LBS | OXYGEN SATURATION: 97 % | HEIGHT: 66 IN | BODY MASS INDEX: 29.09 KG/M2 | DIASTOLIC BLOOD PRESSURE: 64 MMHG | SYSTOLIC BLOOD PRESSURE: 112 MMHG | HEART RATE: 54 BPM

## 2025-08-05 DIAGNOSIS — R21 RASH: ICD-10-CM

## 2025-08-05 DIAGNOSIS — N64.81 PTOSIS OF BREAST: Primary | ICD-10-CM

## 2025-08-05 DIAGNOSIS — N62 BREAST HYPERTROPHY IN FEMALE: ICD-10-CM

## 2025-08-05 DIAGNOSIS — L98.7 EXCESSIVE AND REDUNDANT SKIN AND SUBCUTANEOUS TISSUE: ICD-10-CM

## 2025-08-05 PROCEDURE — 1159F MED LIST DOCD IN RCRD: CPT | Performed by: SURGERY

## 2025-08-05 PROCEDURE — 1160F RVW MEDS BY RX/DR IN RCRD: CPT | Performed by: SURGERY

## 2025-08-05 PROCEDURE — 99024 POSTOP FOLLOW-UP VISIT: CPT | Performed by: SURGERY

## 2025-08-19 ENCOUNTER — TELEPHONE (OUTPATIENT)
Dept: PLASTIC SURGERY | Facility: CLINIC | Age: 45
End: 2025-08-19
Payer: MEDICAID

## 2025-08-21 ENCOUNTER — CONSULT (OUTPATIENT)
Dept: PLASTIC SURGERY | Facility: CLINIC | Age: 45
End: 2025-08-21
Payer: MEDICAID

## 2025-08-21 VITALS
SYSTOLIC BLOOD PRESSURE: 132 MMHG | HEIGHT: 66 IN | WEIGHT: 177.6 LBS | BODY MASS INDEX: 28.54 KG/M2 | TEMPERATURE: 97.2 F | OXYGEN SATURATION: 96 % | HEART RATE: 78 BPM | DIASTOLIC BLOOD PRESSURE: 72 MMHG

## 2025-08-21 DIAGNOSIS — L30.4 INTERTRIGO: ICD-10-CM

## 2025-08-21 DIAGNOSIS — M79.3 PANNICULITIS: ICD-10-CM

## 2025-08-21 DIAGNOSIS — R21 RASH: ICD-10-CM

## 2025-08-21 DIAGNOSIS — L98.7 EXCESSIVE AND REDUNDANT SKIN AND SUBCUTANEOUS TISSUE: Primary | ICD-10-CM

## 2025-08-21 PROCEDURE — 88305 TISSUE EXAM BY PATHOLOGIST: CPT | Performed by: OBSTETRICS & GYNECOLOGY

## 2025-08-22 ENCOUNTER — LAB REQUISITION (OUTPATIENT)
Dept: LAB | Facility: HOSPITAL | Age: 45
End: 2025-08-22
Payer: MEDICAID

## 2025-08-22 DIAGNOSIS — N90.89 OTHER SPECIFIED NONINFLAMMATORY DISORDERS OF VULVA AND PERINEUM: ICD-10-CM

## 2025-08-22 DIAGNOSIS — R22.2 LOCALIZED SWELLING, MASS AND LUMP, TRUNK: ICD-10-CM

## 2025-08-26 LAB
CYTO UR: NORMAL
LAB AP CASE REPORT: NORMAL
LAB AP CLINICAL INFORMATION: NORMAL
PATH REPORT.FINAL DX SPEC: NORMAL
PATH REPORT.GROSS SPEC: NORMAL

## (undated) DEVICE — SNAR POLYPECTOMY LASSO ROT OVL H/C 2.8X15X230MM

## (undated) DEVICE — Device

## (undated) DEVICE — GLV SURG SENSICARE PI MIC PF SZ6.5 LF STRL

## (undated) DEVICE — PROXIMATE RH ROTATING HEAD SKIN STAPLERS (35 WIDE) CONTAINS 35 STAINLESS STEEL STAPLES: Brand: PROXIMATE

## (undated) DEVICE — SHEET,DRAPE,70X85,STERILE: Brand: MEDLINE

## (undated) DEVICE — LINER CANSTR SXN MEDIVAC FLX/ADV 1500ML

## (undated) DEVICE — PLASTIC MAJOR-LF: Brand: MEDLINE INDUSTRIES, INC.

## (undated) DEVICE — MARKER,SKIN,WI/RULER AND LABELS: Brand: MEDLINE

## (undated) DEVICE — BITEBLOCK OMNI BLOC

## (undated) DEVICE — KT DRP SPY/PHI W/ICG 25MG STRL

## (undated) DEVICE — BIOPATCH™ ANTIMICROBIAL DRESSING WITH CHLORHEXIDINE GLUCONATE IS A HYDROPHILLIC POLYURETHANE ABSORPTIVE FOAM WITH CHLORHEXIDINE GLUCONATE (CHG) WHICH INHIBITS BACTERIAL GROWTH UNDER THE DRESSING. THE DRESSING IS INTENDED TO BE USED TO ABSORB EXUDATE, COVER A WOUND CAUSED BY VASCULAR AND NONVASCULAR PERCUTANEOUS MEDICAL DEVICES DURING SURGERY, AS WELL AS REDUCE LOCAL INFECTION AND COLONIZATION OF MICROORGANISMS.: Brand: BIOPATCH

## (undated) DEVICE — FRCP BX RADJAW4 NDL 2.8 240CM LG OG BX40

## (undated) DEVICE — GLV SURG DERMASSURE GRN LF PF SZ 6.5

## (undated) DEVICE — KT ORCA ORCAPOD DISP STRL

## (undated) DEVICE — SUT PDS 2/0 CT1 27IN DYED Z339H

## (undated) DEVICE — SUT MNCRYL 4/0 PS2 18 IN

## (undated) DEVICE — NON-WOVEN ADHESIVE WOUND DRESSING: Brand: PRIMAPORE 35*10CM

## (undated) DEVICE — NEEDLE,18GX1.5",REG,BEVEL: Brand: MEDLINE

## (undated) DEVICE — ELECTRD BLD EZ CLN MOD XLNG 2.75IN

## (undated) DEVICE — ANTIBACTERIAL UNDYED BRAIDED (POLYGLACTIN 910), SYNTHETIC ABSORBABLE SUTURE: Brand: COATED VICRYL

## (undated) DEVICE — SUT ETHLN 3/0 FS1 663G

## (undated) DEVICE — SPNG BULKEE SUPERFLUFF 6X6.75IN LF STRL PK/5

## (undated) DEVICE — ADAPT CLN BIOGUARD AIR/H2O DISP

## (undated) DEVICE — SENSR O2 OXIMAX FNGR A/ 18IN NONSTR

## (undated) DEVICE — 3M™ IOBAN™ 2 ANTIMICROBIAL INCISE DRAPE 6650EZ: Brand: IOBAN™ 2

## (undated) DEVICE — DRSNG PAD ABD 8X10IN STRL

## (undated) DEVICE — GOWN ISOL W/THUMB UNIV BLU BX/15

## (undated) DEVICE — SLV SCD KN/LEN ADJ EXPRSS BLENDED MD 1P/U

## (undated) DEVICE — TUBING, SUCTION, 1/4" X 10', STRAIGHT: Brand: MEDLINE

## (undated) DEVICE — 450 ML BOTTLE OF 0.05% CHLORHEXIDINE GLUCONATE IN 99.95% STERILE WATER FOR IRRIGATION, USP AND APPLICATOR.: Brand: IRRISEPT ANTIMICROBIAL WOUND LAVAGE

## (undated) DEVICE — STRIP,CLOSURE,WOUND,MEDI-STRIP,1/2X4: Brand: MEDLINE

## (undated) DEVICE — MSK ENDO PORT O2 POM ELITE CURAPLEX A/

## (undated) DEVICE — SYR LL TP 10ML STRL

## (undated) DEVICE — STANDARD HYPODERMIC NEEDLE,POLYPROPYLENE HUB: Brand: MONOJECT

## (undated) DEVICE — PROXIMATE RH ROTATING HEAD SKIN STAPLERS (35 REGULAR) CONTAINS 35 STAINLESS STEEL STAPLES: Brand: PROXIMATE

## (undated) DEVICE — STERILE POLYISOPRENE POWDER-FREE SURGICAL GLOVES WITH EMOLLIENT COATING: Brand: PROTEXIS

## (undated) DEVICE — GOWN,SIRUS,POLYRNF,BRTHSLV,XL,30/CS: Brand: MEDLINE

## (undated) DEVICE — INTENDED FOR TISSUE SEPARATION, AND OTHER PROCEDURES THAT REQUIRE A SHARP SURGICAL BLADE TO PUNCTURE OR CUT.: Brand: BARD-PARKER ® CARBON RIB-BACK BLADES

## (undated) DEVICE — THE STERILE LIGHT HANDLE COVER IS USED WITH STERIS SURGICAL LIGHTING AND VISUALIZATION SYSTEMS.

## (undated) DEVICE — CANN O2 ETCO2 FITS ALL CONN CO2 SMPL A/ 7IN DISP LF

## (undated) DEVICE — PENCL SMOKE/EVAC MEGADYNE TELESCP 10FT

## (undated) DEVICE — LN SMPL CO2 SHTRM SD STREAM W/M LUER

## (undated) DEVICE — ADHS LIQ MASTISOL 2/3ML

## (undated) DEVICE — KT LINEN QUICKSQUITE SAHARA STD DRW/LIFT 60X78IN

## (undated) DEVICE — VIAL FORMLN CAP 10PCT 20ML

## (undated) DEVICE — CAUTERY TIP POLISHER: Brand: DEVON

## (undated) DEVICE — TOWEL,OR,DSP,ST,BLUE,STD,4/PK,20PK/CS: Brand: MEDLINE

## (undated) DEVICE — SUT VIC 2/0 CT1 27IN J259H

## (undated) DEVICE — RESERVOIR,SUCTION,100CC,SILICONE: Brand: MEDLINE

## (undated) DEVICE — DRAPE,INSTRUMENT,MAGNETIC,10X16: Brand: MEDLINE

## (undated) DEVICE — SUT GUT CHRM 5/0 RB1 27IN U202H

## (undated) DEVICE — TRAP POLYP ETRAP 2PK

## (undated) DEVICE — ADAPT CLN SCPE ENDO PORPOISE BX/50 DISP